# Patient Record
Sex: MALE | Race: WHITE | NOT HISPANIC OR LATINO | Employment: FULL TIME | URBAN - METROPOLITAN AREA
[De-identification: names, ages, dates, MRNs, and addresses within clinical notes are randomized per-mention and may not be internally consistent; named-entity substitution may affect disease eponyms.]

---

## 2017-02-15 DIAGNOSIS — E11.9 TYPE 2 DIABETES MELLITUS (H): Primary | ICD-10-CM

## 2017-02-15 RX ORDER — FLURBIPROFEN SODIUM 0.3 MG/ML
SOLUTION/ DROPS OPHTHALMIC
Qty: 400 EACH | Status: SHIPPED | OUTPATIENT
Start: 2017-02-15 | End: 2024-04-11

## 2017-02-15 RX ORDER — FLURBIPROFEN SODIUM 0.3 MG/ML
SOLUTION/ DROPS OPHTHALMIC
COMMUNITY
Start: 2016-10-17 | End: 2017-02-15

## 2017-02-15 NOTE — TELEPHONE ENCOUNTER
B-D U/F insulin pen needle  Last Written Prescription Date: Historical   Last Office Visit with [facility], [facility] or [facility] prescribing provider:  9/29/16        BP Readings from Last 3 Encounters:   09/29/16 108/68   12/16/15 108/72   01/28/15 100/74     Lab Results   Component Value Date    MICROL 11 09/29/2016     No results found for: MICROALBUMIN  Creatinine   Date Value Ref Range Status   09/29/2016 1.03 0.66 - 1.25 mg/dL Final   ]  GFR Estimate   Date Value Ref Range Status   09/29/2016 78 >60 mL/min/1.7m2 Final     Comment:     Non  GFR Calc   12/16/2015 81 >60 mL/min/1.7m2 Final     Comment:     Non  GFR Calc   01/28/2015 >90  Non  GFR Calc   >60 mL/min/1.7m2 Final     GFR Estimate If Black   Date Value Ref Range Status   09/29/2016 >90   GFR Calc   >60 mL/min/1.7m2 Final   12/16/2015 >90   GFR Calc   >60 mL/min/1.7m2 Final   01/28/2015 >90   GFR Calc   >60 mL/min/1.7m2 Final     Lab Results   Component Value Date    CHOL 189 09/29/2016     Lab Results   Component Value Date    HDL 46 09/29/2016     Lab Results   Component Value Date     09/29/2016     Lab Results   Component Value Date    TRIG 105 09/29/2016     Lab Results   Component Value Date    CHOLHDLRATIO 3.2 01/28/2015     Lab Results   Component Value Date    AST 15 09/29/2016     Lab Results   Component Value Date    ALT 38 09/29/2016     Lab Results   Component Value Date    A1C 6.6 09/29/2016    A1C 6.8 03/30/2016    A1C 6.8 12/16/2015    A1C 7.2 01/28/2015    A1C 7.3 05/21/2014     Potassium   Date Value Ref Range Status   09/29/2016 4.2 3.4 - 5.3 mmol/L Final

## 2017-02-20 ENCOUNTER — MYC REFILL (OUTPATIENT)
Dept: INTERNAL MEDICINE | Facility: CLINIC | Age: 47
End: 2017-02-20

## 2017-02-20 DIAGNOSIS — E10.9 TYPE 1 DIABETES MELLITUS WITHOUT COMPLICATION (H): ICD-10-CM

## 2017-02-21 NOTE — TELEPHONE ENCOUNTER
Kwikpen         Last Written Prescription Date: 9/29/16  Last Fill Quantity: 54, # refills: 11  Last Office Visit with [facility], P or [facility] prescribing provider:  9/29/16        BP Readings from Last 3 Encounters:   09/29/16 108/68   12/16/15 108/72   01/28/15 100/74     Lab Results   Component Value Date    MICROL 11 09/29/2016     No results found for: MICROALBUMIN  Creatinine   Date Value Ref Range Status   09/29/2016 1.03 0.66 - 1.25 mg/dL Final   ]  GFR Estimate   Date Value Ref Range Status   09/29/2016 78 >60 mL/min/1.7m2 Final     Comment:     Non  GFR Calc   12/16/2015 81 >60 mL/min/1.7m2 Final     Comment:     Non  GFR Calc   01/28/2015 >90  Non  GFR Calc   >60 mL/min/1.7m2 Final     GFR Estimate If Black   Date Value Ref Range Status   09/29/2016 >90   GFR Calc   >60 mL/min/1.7m2 Final   12/16/2015 >90   GFR Calc   >60 mL/min/1.7m2 Final   01/28/2015 >90   GFR Calc   >60 mL/min/1.7m2 Final     Lab Results   Component Value Date    CHOL 189 09/29/2016     Lab Results   Component Value Date    HDL 46 09/29/2016     Lab Results   Component Value Date     09/29/2016     Lab Results   Component Value Date    TRIG 105 09/29/2016     Lab Results   Component Value Date    CHOLHDLRATIO 3.2 01/28/2015     Lab Results   Component Value Date    AST 15 09/29/2016     Lab Results   Component Value Date    ALT 38 09/29/2016     Lab Results   Component Value Date    A1C 6.6 09/29/2016    A1C 6.8 03/30/2016    A1C 6.8 12/16/2015    A1C 7.2 01/28/2015    A1C 7.3 05/21/2014     Potassium   Date Value Ref Range Status   09/29/2016 4.2 3.4 - 5.3 mmol/L Final

## 2017-02-21 NOTE — TELEPHONE ENCOUNTER
Message from [employer]:  Original authorizing provider: MD Aldo Mascorro would like a refill of the following medications:  insulin lispro (HUMALOG KWIKPEN) 100 UNIT/ML soln [Kaleb Rosenthal MD]    Preferred pharmacy: [facility] - [address]    Comment:  Please make sure this is a 3 month supply! I am not getting that, it's only one box when it should be 5. Thanks

## 2017-03-08 ENCOUNTER — MYC MEDICAL ADVICE (OUTPATIENT)
Dept: INTERNAL MEDICINE | Facility: CLINIC | Age: 47
End: 2017-03-08

## 2017-03-08 DIAGNOSIS — E10.9 TYPE 1 DIABETES, HBA1C GOAL < 8% (H): ICD-10-CM

## 2017-03-08 DIAGNOSIS — E10.9 TYPE 1 DIABETES MELLITUS WITHOUT COMPLICATION (H): Primary | ICD-10-CM

## 2017-03-08 DIAGNOSIS — E10.9 TYPE 1 DIABETES, HBA1C GOAL < 7% (H): ICD-10-CM

## 2017-05-04 ENCOUNTER — MYC REFILL (OUTPATIENT)
Dept: INTERNAL MEDICINE | Facility: CLINIC | Age: 47
End: 2017-05-04

## 2017-05-04 DIAGNOSIS — E10.9 TYPE 1 DIABETES MELLITUS WITHOUT COMPLICATION (H): ICD-10-CM

## 2017-05-04 NOTE — TELEPHONE ENCOUNTER
Message from MyChart:  Original authorizing provider: MD Aldo Mascorro INGRIDDede Guevaramckenzie would like a refill of the following medications:  blood glucose monitoring (ONE TOUCH VERIO IQ) test strip [Kaleb Rosenthal MD]    Preferred pharmacy: [facility] - [address]    Comment:  This prescription needs to ve tripled to be a 3 moknth refill. Thanks, Pedro

## 2017-05-15 ENCOUNTER — MYC REFILL (OUTPATIENT)
Dept: INTERNAL MEDICINE | Facility: CLINIC | Age: 47
End: 2017-05-15

## 2017-05-15 DIAGNOSIS — E10.9 TYPE 1 DIABETES MELLITUS WITHOUT COMPLICATION (H): ICD-10-CM

## 2017-05-16 NOTE — TELEPHONE ENCOUNTER
Message from [employer]:  Original authorizing provider: MD Aldo Mascorro would like a refill of the following medications:  insulin lispro (HUMALOG KWIKPEN) 100 UNIT/ML injection [Kaleb Rosenthal MD]    Preferred pharmacy: [facility] - [address]    Comment:  I have one pen left. This prescription is not enough for 3 months. Please process this asap!! Thank you

## 2017-06-22 ENCOUNTER — MYC MEDICAL ADVICE (OUTPATIENT)
Dept: INTERNAL MEDICINE | Facility: CLINIC | Age: 47
End: 2017-06-22

## 2017-07-06 ENCOUNTER — OFFICE VISIT (OUTPATIENT)
Dept: INTERNAL MEDICINE | Facility: CLINIC | Age: 47
End: 2017-07-06
Payer: COMMERCIAL

## 2017-07-06 VITALS
HEART RATE: 63 BPM | BODY MASS INDEX: 32.79 KG/M2 | OXYGEN SATURATION: 95 % | TEMPERATURE: 97.6 F | WEIGHT: 234.2 LBS | HEIGHT: 71 IN | DIASTOLIC BLOOD PRESSURE: 72 MMHG | SYSTOLIC BLOOD PRESSURE: 108 MMHG

## 2017-07-06 DIAGNOSIS — E10.9 TYPE 1 DIABETES MELLITUS WITHOUT COMPLICATION (H): ICD-10-CM

## 2017-07-06 DIAGNOSIS — E78.5 HYPERLIPIDEMIA LDL GOAL <100: ICD-10-CM

## 2017-07-06 DIAGNOSIS — I10 ESSENTIAL HYPERTENSION, BENIGN: ICD-10-CM

## 2017-07-06 LAB
ALBUMIN SERPL-MCNC: 3.7 G/DL (ref 3.4–5)
ALP SERPL-CCNC: 72 U/L (ref 40–150)
ALT SERPL W P-5'-P-CCNC: 37 U/L (ref 0–70)
ANION GAP SERPL CALCULATED.3IONS-SCNC: 6 MMOL/L (ref 3–14)
AST SERPL W P-5'-P-CCNC: 16 U/L (ref 0–45)
BILIRUB SERPL-MCNC: 0.6 MG/DL (ref 0.2–1.3)
BUN SERPL-MCNC: 21 MG/DL (ref 7–30)
CALCIUM SERPL-MCNC: 9.2 MG/DL (ref 8.5–10.1)
CHLORIDE SERPL-SCNC: 109 MMOL/L (ref 94–109)
CHOLEST SERPL-MCNC: 194 MG/DL
CO2 SERPL-SCNC: 28 MMOL/L (ref 20–32)
CREAT SERPL-MCNC: 0.94 MG/DL (ref 0.66–1.25)
CREAT UR-MCNC: 245 MG/DL
GFR SERPL CREATININE-BSD FRML MDRD: 86 ML/MIN/1.7M2
GLUCOSE SERPL-MCNC: 67 MG/DL (ref 70–99)
HBA1C MFR BLD: 6.7 % (ref 4.3–6)
HDLC SERPL-MCNC: 45 MG/DL
LDLC SERPL CALC-MCNC: 127 MG/DL
MICROALBUMIN UR-MCNC: 14 MG/L
MICROALBUMIN/CREAT UR: 5.55 MG/G CR (ref 0–17)
NONHDLC SERPL-MCNC: 149 MG/DL
POTASSIUM SERPL-SCNC: 4 MMOL/L (ref 3.4–5.3)
PROT SERPL-MCNC: 7.4 G/DL (ref 6.8–8.8)
SODIUM SERPL-SCNC: 143 MMOL/L (ref 133–144)
T4 FREE SERPL-MCNC: 0.95 NG/DL (ref 0.76–1.46)
TRIGL SERPL-MCNC: 110 MG/DL
TSH SERPL DL<=0.005 MIU/L-ACNC: 4.04 MU/L (ref 0.4–4)

## 2017-07-06 PROCEDURE — 80053 COMPREHEN METABOLIC PANEL: CPT | Performed by: INTERNAL MEDICINE

## 2017-07-06 PROCEDURE — 84439 ASSAY OF FREE THYROXINE: CPT | Performed by: INTERNAL MEDICINE

## 2017-07-06 PROCEDURE — 82043 UR ALBUMIN QUANTITATIVE: CPT | Performed by: INTERNAL MEDICINE

## 2017-07-06 PROCEDURE — 84443 ASSAY THYROID STIM HORMONE: CPT | Performed by: INTERNAL MEDICINE

## 2017-07-06 PROCEDURE — 36415 COLL VENOUS BLD VENIPUNCTURE: CPT | Performed by: INTERNAL MEDICINE

## 2017-07-06 PROCEDURE — 99214 OFFICE O/P EST MOD 30 MIN: CPT | Performed by: INTERNAL MEDICINE

## 2017-07-06 PROCEDURE — 80061 LIPID PANEL: CPT | Performed by: INTERNAL MEDICINE

## 2017-07-06 PROCEDURE — 83036 HEMOGLOBIN GLYCOSYLATED A1C: CPT | Performed by: INTERNAL MEDICINE

## 2017-07-06 RX ORDER — SIMVASTATIN 40 MG
40 TABLET ORAL AT BEDTIME
Qty: 90 TABLET | Refills: 3 | Status: SHIPPED | OUTPATIENT
Start: 2017-07-06 | End: 2018-03-06

## 2017-07-06 RX ORDER — AMLODIPINE BESYLATE 5 MG/1
5 TABLET ORAL DAILY
Qty: 90 TABLET | Refills: 3 | Status: SHIPPED | OUTPATIENT
Start: 2017-07-06 | End: 2018-03-06

## 2017-07-06 NOTE — LETTER
[facility]  [address]  [address]      July 6, 2017      Aldo Sharpe  [address] [place] [id number]          Dear Aldo,      I have enclosed a copy of your most recent labs done here at the clinic and if available some of your prior labs for comparison.     Your Hemoglobin A1C and blood sugar tests look good and I would continue with your medication without change.  These tests should be repeated in 6 months.    Your LDL cholesterol is high and could be treated more aggressively.  Please follow-up with me to discuss your further more aggressive medication options if you are interested.    In addition, your liver function tests are completely normal and should be rechecked with your next cholesterol level.    Your thyroid function test is slightly abnormal but stable and should be rechecked here in the clinic in 6 months with a follow-up visit with me.  I will look forward to seeing you at that time and please call to make an appointment.    Please call me if you have further questions.        Kaleb Rosenthal MD

## 2017-07-06 NOTE — MR AVS SNAPSHOT
After Visit Summary   7/6/2017    Aldo Sharpe    MRN: [id number]           Patient Information     Date Of Birth          1970        Visit Information        Provider Department      7/6/2017 8:40 AM Kaleb Rosenthal MD [facility]        Today's Diagnoses     Type 1 diabetes mellitus without complication (H)        Hyperlipidemia LDL goal <100        Essential hypertension, benign           Follow-ups after your visit        Additional Services     ENDOCRINOLOGY ADULT REFERRAL       Your provider has referred you to: [facility]: [facility] [phone]   [email or web address]      Please be aware that coverage of these services is subject to the terms and limitations of your health insurance plan.  Call member services at your health plan with any benefit or coverage questions.      Please bring the following to your appointment:    >>   Any x-rays, CTs or MRIs which have been performed.  Contact the facility where they were done to arrange for  prior to your scheduled appointment.    >>   List of current medications   >>   This referral request   >>   Any documents/labs given to you for this referral                  Follow-up notes from your care team     Return in about 6 months (around 1/6/2018), or if symptoms worsen or fail to improve, for Lab Work.      Who to contact     If you have questions or need follow up information about today's clinic visit or your schedule please contact [facility] directly at [phone].  Normal or non-critical lab and imaging results will be communicated to you by MyChart, letter or phone within 4 business days after the clinic has received the results. If you do not hear from us within 7 days, please contact the clinic through MyChart or phone. If you have a critical or abnormal lab result, we will notify you by phone as soon as possible.  Submit refill  "requests through [employer] or call your pharmacy and they will forward the refill request to us. Please allow 3 business days for your refill to be completed.          Additional Information About Your Visit        [employer] Information     [employer] gives you secure access to your electronic health record. If you see a primary care provider, you can also send messages to your care team and make appointments. If you have questions, please call your primary care clinic.  If you do not have a primary care provider, please call [phone] and they will assist you.        Care EveryWhere ID     This is your Care EveryWhere ID. This could be used by other organizations to access your [place] medical records  [id number]        Your Vitals Were     Pulse Temperature Height Pulse Oximetry BMI (Body Mass Index)       63 97.6  F (36.4  C) (Oral) 5' 11\" (1.803 m) 95% 32.66 kg/m2        Blood Pressure from Last 3 Encounters:   07/06/17 108/72   09/29/16 108/68   12/16/15 108/72    Weight from Last 3 Encounters:   07/06/17 234 lb 3.2 oz (106.2 kg)   09/29/16 225 lb 6.4 oz (102.2 kg)   12/16/15 228 lb 4.8 oz (103.6 kg)              We Performed the Following     Albumin Random Urine Quantitative     Comprehensive metabolic panel     ENDOCRINOLOGY ADULT REFERRAL     Hemoglobin A1c     Lipid Profile     TSH with free T4 reflex          Today's Medication Changes          These changes are accurate as of: 7/6/17  9:02 AM.  If you have any questions, ask your nurse or doctor.               These medicines have changed or have updated prescriptions.        Dose/Directions    insulin glargine 100 UNIT/ML injection   Commonly known as:  LANTUS SOLOSTAR   This may have changed:    - how much to take  - how to take this  - when to take this  - additional instructions   Used for:  Type 1 diabetes mellitus without complication (H)   Changed by:  Kaleb Rosenthal MD        Dose:  66 Units   Inject 66 Units Subcutaneous daily   Quantity:  60 " mL   Refills:  3       insulin lispro 100 UNIT/ML injection   Commonly known as:  HumaLOG KWIKpen   This may have changed:    - how much to take  - how to take this  - when to take this   Used for:  Type 1 diabetes mellitus without complication (H)   Changed by:  Kaleb Rosenthal MD        Dose:  50 Units   Inject 50 Units Subcutaneous daily 10 units before breakfast, 15 units before lunch, 25 units before dinner + sliding scale With needle tips for KWIKPEN   Quantity:  45 mL   Refills:  3            Where to get your medicines      These medications were sent to [facility] - [place], [place] - [address] [place] [address]  [address] [facility] [address]     Phone:  [phone]     amLODIPine 5 MG tablet    blood glucose monitoring test strip    insulin glargine 100 UNIT/ML injection    simvastatin 40 MG tablet         Some of these will need a paper prescription and others can be bought over the counter.  Ask your nurse if you have questions.     Bring a paper prescription for each of these medications     insulin lispro 100 UNIT/ML injection                Primary Care Provider Office Phone # Fax #    Kaleb Rosenthal [phone]       [facility] [address] [facility] [address]        Equal Access to Services     DINA PEREZ AH: Hadii aad ku hadasho Soomaali, waaxda luqadaha, qaybta kaalmada adeegyada, waxay idiin hayaan wagner clark. So [facility] [phone].    ATENCIÓN: Si habla español, tiene a olson disposición servicios gratuitos de asistencia lingüística. [facility] [phone].    We comply with applicable federal civil rights laws and [place] laws. We do not discriminate on the basis of race, color, national origin, age, disability sex, sexual orientation or gender identity.            Thank you!     Thank you for choosing [facility]  for your care. Our goal is always to provide you with excellent care. Hearing back from  "our patients is one way we can continue to improve our services. Please take a few minutes to complete the written survey that you may receive in the mail after your visit with us. Thank you!             Your Updated Medication List - Protect others around you: Learn how to safely use, store and throw away your medicines at www.disposemymeds.org.          This list is accurate as of: 7/6/17  9:02 AM.  Always use your most recent med list.                   Brand Name Dispense Instructions for use Diagnosis    amLODIPine 5 MG tablet    NORVASC    90 tablet    Take 1 tablet (5 mg) by mouth daily    Essential hypertension, benign       aspirin 81 MG tablet     100 tablet    Take 1 tablet (81 mg) by mouth daily    Type 1 diabetes, HbA1c goal < 8% (H)       B-D U/F 31G X 5 MM   Generic drug:  insulin pen needle     400 each    Use one Syringe 4 times daily or as directed    Type 2 diabetes mellitus (H)       blood glucose monitoring lancets     1 Box    Use to test blood sugars 4-8 times daily or as directed.    Type 1 diabetes, HbA1c goal < 7% (H)       blood glucose monitoring meter device kit     1 kit    4-8 times per day    Type 1 diabetes, HbA1c goal < 8% (H)       blood glucose monitoring test strip    ONE TOUCH VERIO IQ    400 strip    Use to test blood sugars 4-8 times daily or as directed.    Type 1 diabetes mellitus without complication (H)       Fish Oil Oil      3 capsules.        insulin glargine 100 UNIT/ML injection    LANTUS SOLOSTAR    60 mL    Inject 66 Units Subcutaneous daily    Type 1 diabetes mellitus without complication (H)       insulin lispro 100 UNIT/ML injection    HumaLOG KWIKpen    45 mL    Inject 50 Units Subcutaneous daily 10 units before breakfast, 15 units before lunch, 25 units before dinner + sliding scale With needle tips for KWIKPEN    Type 1 diabetes mellitus without complication (H)       insulin syringe-needle U-100 31G X 5/16\" 0.5 ML    BD insulin syringe ultrafine    400 each "    Use one syringe 4 daily or as directed.    Type 1 diabetes, HbA1c goal < 8% (H)       lisinopril 20 MG tablet    PRINIVIL/ZESTRIL    180 tablet    Take 1 tablet (20 mg) by mouth 2 times daily    Type 1 diabetes mellitus without complication (H), Essential hypertension, benign       multivitamin, therapeutic with minerals Tabs tablet     90 each    Take 1 tablet by mouth daily    Type 1 diabetes mellitus without complication (H)       simvastatin 40 MG tablet    ZOCOR    90 tablet    Take 1 tablet (40 mg) by mouth At Bedtime    Hyperlipidemia LDL goal <100

## 2017-07-06 NOTE — NURSING NOTE
"Chief Complaint   Patient presents with     Diabetes       Initial /72  Pulse 63  Temp 97.6  F (36.4  C) (Oral)  Ht 5' 11\" (1.803 m)  Wt 234 lb 3.2 oz (106.2 kg)  SpO2 95%  BMI 32.66 kg/m2 Estimated body mass index is 32.66 kg/(m^2) as calculated from the following:    Height as of this encounter: 5' 11\" (1.803 m).    Weight as of this encounter: 234 lb 3.2 oz (106.2 kg).  Medication Reconciliation: complete   Maribell Chavarria, SHEBA      "

## 2017-07-06 NOTE — PROGRESS NOTES
SUBJECTIVE:                                                    Aldo Sharpe is a 47 year old male who presents to clinic today for the following health issues:      Diabetes Follow-up    Patient is checking blood sugars: 8-9 times daily.    Blood sugar testing frequency justification: Adjustment of medication(s)  Results are as follows:         am - 100              postprandial after breakfast - 115         lunchtime - 120              postprandial after lunch- 130         suppertime - 100              postprandial after supper- 130         bedtime - 100    Diabetic concerns: None     Symptoms of hypoglycemia (low blood sugar): none     Paresthesias (numbness or burning in feet) or sores: No     Date of last diabetic eye exam: 2016    Hyperlipidemia Follow-Up      Rate your low fat/cholesterol diet?: good    Taking statin?  No    Other lipid medications/supplements?:  none    Hypertension Follow-up      Outpatient blood pressures are being checked at grocery store.  Results are average.    Low Salt Diet: not monitoring salt      Amount of exercise or physical activity: Walks-     Problems taking medications regularly: No    Medication side effects: none    Diet: low fat/cholesterol    Other concerns:  [place] DMV diabetic form to be updated    Problem list and histories reviewed & adjusted, as indicated.  Additional history: as documented    Patient Active Problem List   Diagnosis     HYPERLIPIDEMIA LDL GOAL <100     Type 1 diabetes mellitus without complication (H)     Essential hypertension, benign     History reviewed. No pertinent surgical history.    Social History   Substance Use Topics     Smoking status: Never Smoker     Smokeless tobacco: Not on file     Alcohol use No      Comment: none     History reviewed. No pertinent family history.      Current Outpatient Prescriptions   Medication Sig Dispense Refill     blood glucose monitoring (ONE TOUCH VERIO IQ) test strip Use to test blood sugars  "4-8 times daily or as directed. 400 strip PRN     insulin lispro (HUMALOG KWIKPEN) 100 UNIT/ML injection Inject 50 Units Subcutaneous daily 10 units before breakfast, 15 units before lunch, 25 units before dinner + sliding scale With needle tips for KWIKPEN 45 mL 3     simvastatin (ZOCOR) 40 MG tablet Take 1 tablet (40 mg) by mouth At Bedtime 90 tablet 3     insulin glargine (LANTUS SOLOSTAR) 100 UNIT/ML injection Inject 66 Units Subcutaneous daily 60 mL 3     amLODIPine (NORVASC) 5 MG tablet Take 1 tablet (5 mg) by mouth daily 90 tablet 3     blood glucose monitoring (ONETOUCH ULTRASMART) meter device kit 4-8 times per day 1 kit 0     blood glucose monitoring (ONE TOUCH DELICA) lancets Use to test blood sugars 4-8 times daily or as directed. 1 Box prn     B-D U/F insulin pen needle Use one Syringe 4 times daily or as directed 400 each prn     lisinopril (PRINIVIL,ZESTRIL) 20 MG tablet Take 1 tablet (20 mg) by mouth 2 times daily 180 tablet 3     multivitamin, therapeutic with minerals (MULTI-VITAMIN) TABS Take 1 tablet by mouth daily 90 each 1     insulin syringe-needle U-100 (BD INSULIN SYRINGE ULTRAFINE) 31G X 5/16\" 0.5 ML Use one syringe 4 daily or as directed. 400 each prn     aspirin 81 MG tablet Take 1 tablet (81 mg) by mouth daily 100 tablet 3     Fish Oil OIL 3 capsules.       [DISCONTINUED] insulin lispro (HUMALOG KWIKPEN) 100 UNIT/ML injection 10 units before breakfast, 15 units before lunch, 25 units before dinner + sliding scale With needle tips for KWIKPEN 270 mL 0     [DISCONTINUED] amLODIPine (NORVASC) 5 MG tablet Take 1 tablet (5 mg) by mouth daily 90 tablet 3     [DISCONTINUED] insulin glargine (LANTUS SOLOSTAR) 100 UNIT/ML PEN 66 units at bedtime (Patient taking differently: Inject 67 Units Subcutaneous daily 48 units in AM and 19 Units in PM) 9 mL 11     [DISCONTINUED] simvastatin (ZOCOR) 40 MG tablet Take 1 tablet (40 mg) by mouth At Bedtime 90 tablet 3     No Known Allergies  Recent Labs   Lab " "Test  09/29/16   0748  03/30/16   0857  12/16/15   0825  01/28/15   1043   A1C  6.6*  6.8*  6.8*  7.2*   LDL  122*   --   92  104   HDL  46   --   46  54   TRIG  105   --   92  75   ALT  38   --   44  47   CR  1.03   --   1.00  0.89   GFRESTIMATED  78   --   81  >90  Non  GFR Calc     GFRESTBLACK  >90   GFR Calc     --   >90   GFR Calc    >90   GFR Calc     POTASSIUM  4.2   --   4.4  4.3   TSH  3.31   --   3.09  2.10      BP Readings from Last 3 Encounters:   09/29/16 108/68   12/16/15 108/72   01/28/15 100/74    Wt Readings from Last 3 Encounters:   09/29/16 225 lb 6.4 oz (102.2 kg)   12/16/15 228 lb 4.8 oz (103.6 kg)   01/28/15 232 lb 3.2 oz (105.3 kg)           Labs reviewed in EPIC    Reviewed and updated as needed this visit by clinical staff  Tobacco  Allergies  Med Hx  Surg Hx  Fam Hx  Soc Hx      Reviewed and updated as needed this visit by Provider       ROS:  C: NEGATIVE for fever, chills, change in weight  E/M: NEGATIVE for ear, mouth and throat problems  R: NEGATIVE for significant cough or SOB  CV: NEGATIVE for chest pain, palpitations or peripheral edema  GI: NEGATIVE for nausea, abdominal pain, heartburn, or change in bowel habits  : NEGATIVE for frequency, dysuria, or hematuria  M: NEGATIVE for significant arthralgias or myalgia  H: NEGATIVE for bleeding problems  P: NEGATIVE for changes in mood or affect    OBJECTIVE:                                                    /72  Pulse 63  Temp 97.6  F (36.4  C) (Oral)  Ht 5' 11\" (1.803 m)  Wt 234 lb 3.2 oz (106.2 kg)  SpO2 95%  BMI 32.66 kg/m2  Body mass index is 32.66 kg/(m^2).  GENERAL: healthy, alert and no distress  EYES: Eyes grossly normal to inspection, extraocular movements - intact, and PERRL  HENT: ear canals- normal; TMs- normal; Nose- normal; Mouth- no ulcers, no lesions  NECK: no tenderness, no adenopathy, no asymmetry, no masses, no stiffness; thyroid- normal " to palpation  RESP: lungs clear to auscultation - no rales, no rhonchi, no wheezes  CV: regular rates and rhythm, normal S1 S2, no S3 or S4 and no click or rub   MS: extremities- no gross deformities noted  PSYCH: Alert and oriented times 3; speech- coherent , normal rate and volume; able to articulate logical thoughts, able to abstract reason, no tangential thoughts, no hallucinations or delusions, affect- normal       ASSESSMENT/PLAN:                                                    (E10.9) Type 1 diabetes mellitus without complication (H)  Comment: DMV forms filled out and faxed and also advised that may benefit from seeing Dr. Dimas.  Plan: blood glucose monitoring (ONE TOUCH VERIO IQ)         test strip, insulin lispro (HUMALOG KWIKPEN)         100 UNIT/ML injection, insulin glargine (LANTUS        SOLOSTAR) 100 UNIT/ML injection, Comprehensive         metabolic panel, Hemoglobin A1c, Albumin Random        Urine Quantitative, TSH with free T4 reflex            (E78.5) Hyperlipidemia LDL goal <100  Comment: labs as fasting  Plan: simvastatin (ZOCOR) 40 MG tablet, Lipid Profile            (I10) Essential hypertension, benign  Comment: stable at goal on therapy  Plan: amLODIPine (NORVASC) 5 MG tablet, Comprehensive        metabolic panel          See Patient Instructions    Kaleb Rosenthal MD  [facility]    THE MEDICATION LIST HAS BEEN FULLY RECONCILED BY THE M.D. AND THE NURSING STAFF.  25 minutes spent with this patient, face to face, discussing treatment options for listed problems above as well as side effects of appropriate medications.  Counseling time extended beyond 50% of the clinic visit.  Medication dosing, treatment plan and follow-up were discussed. Also reviewed need for primary care testing for patient.

## 2017-09-19 ENCOUNTER — MYC REFILL (OUTPATIENT)
Dept: INTERNAL MEDICINE | Facility: CLINIC | Age: 47
End: 2017-09-19

## 2017-09-19 DIAGNOSIS — E10.9 TYPE 1 DIABETES MELLITUS WITHOUT COMPLICATION (H): ICD-10-CM

## 2017-09-20 NOTE — TELEPHONE ENCOUNTER
Message from [employer]:  Original authorizing provider: MD Aldo Mascorro would like a refill of the following medications:  insulin lispro (HUMALOG KWIKPEN) 100 UNIT/ML injection [Kaleb Rosenthal MD]    Preferred pharmacy: [facility] - [address]    Comment:  Please update the amount. I am being sent over and over a one month supply and it is supposed to be a 3 month supply. Thank you very much!

## 2017-09-25 DIAGNOSIS — E10.9 TYPE 1 DIABETES MELLITUS WITHOUT COMPLICATION (H): ICD-10-CM

## 2017-09-25 NOTE — TELEPHONE ENCOUNTER
The amount dispensed in the 9/20/2017 refill is not enough, it should be 5 1/2 boxes.  Please correct refill and call [facility] at [phone] Ext. [phone] for an override.

## 2017-09-26 DIAGNOSIS — I10 ESSENTIAL HYPERTENSION, BENIGN: ICD-10-CM

## 2017-09-26 DIAGNOSIS — E10.9 TYPE 1 DIABETES MELLITUS WITHOUT COMPLICATION (H): ICD-10-CM

## 2017-09-26 NOTE — TELEPHONE ENCOUNTER
Attempted to call pt line busy x2 . Clarification in needing the maximum he uses daily for sliding scale .Anna Delaney RN

## 2017-09-27 RX ORDER — LISINOPRIL 20 MG/1
TABLET ORAL
Qty: 180 TABLET | Refills: 2 | Status: SHIPPED | OUTPATIENT
Start: 2017-09-27 | End: 2018-03-06

## 2017-09-27 NOTE — TELEPHONE ENCOUNTER
Lisinopril       Last Written Prescription Date: 9/29/16  Last Fill Quantity: 180, # refills: 3  Last Office Visit with G, [email or web address] or [facility] prescribing provider: 7/6/17       Potassium   Date Value Ref Range Status   07/06/2017 4.0 3.4 - 5.3 mmol/L Final     Creatinine   Date Value Ref Range Status   07/06/2017 0.94 0.66 - 1.25 mg/dL Final     BP Readings from Last 3 Encounters:   07/06/17 108/72   09/29/16 108/68   12/16/15 108/72

## 2017-09-28 NOTE — TELEPHONE ENCOUNTER
MD please sign . Pt called and states his max sliding scale in 25 units a day but it cannot be written that way so pharmacy recommended these instructions . This has been called into pharmacy .Anna Delaney RN

## 2017-09-28 NOTE — TELEPHONE ENCOUNTER
How  Rx is currently written states pt has scheduled dose 25 units at bedtime which is not appropriate, especially if just needing extra insulin. Please clarify the dose that pt is using with each meal and if he is using sliding scale that is added to the  Usual 3x/day injections with meals that ends up being an extra 25 units through the day or what pt is doing specifically and then route back

## 2017-12-17 DIAGNOSIS — E10.9 TYPE 1 DIABETES MELLITUS WITHOUT COMPLICATION (H): ICD-10-CM

## 2017-12-19 RX ORDER — INSULIN LISPRO 100 [IU]/ML
INJECTION, SOLUTION INTRAVENOUS; SUBCUTANEOUS
Qty: 120 ML | Refills: 0 | Status: SHIPPED | OUTPATIENT
Start: 2017-12-19 | End: 2018-03-06

## 2018-03-06 ENCOUNTER — OFFICE VISIT (OUTPATIENT)
Dept: ENDOCRINOLOGY | Facility: CLINIC | Age: 48
End: 2018-03-06
Payer: COMMERCIAL

## 2018-03-06 ENCOUNTER — TELEPHONE (OUTPATIENT)
Dept: INTERNAL MEDICINE | Facility: CLINIC | Age: 48
End: 2018-03-06

## 2018-03-06 VITALS
BODY MASS INDEX: 32.73 KG/M2 | TEMPERATURE: 98.4 F | HEART RATE: 68 BPM | SYSTOLIC BLOOD PRESSURE: 122 MMHG | HEIGHT: 71 IN | WEIGHT: 233.8 LBS | DIASTOLIC BLOOD PRESSURE: 70 MMHG

## 2018-03-06 DIAGNOSIS — E78.5 HYPERLIPIDEMIA LDL GOAL <100: ICD-10-CM

## 2018-03-06 DIAGNOSIS — E10.9 TYPE 1 DIABETES MELLITUS WITHOUT COMPLICATION (H): Primary | ICD-10-CM

## 2018-03-06 DIAGNOSIS — E03.8 SUBCLINICAL HYPOTHYROIDISM: ICD-10-CM

## 2018-03-06 DIAGNOSIS — I10 ESSENTIAL HYPERTENSION, BENIGN: ICD-10-CM

## 2018-03-06 DIAGNOSIS — E10.9 TYPE 1 DIABETES MELLITUS WITHOUT COMPLICATION (H): ICD-10-CM

## 2018-03-06 DIAGNOSIS — R53.82 CHRONIC FATIGUE: ICD-10-CM

## 2018-03-06 LAB
HBA1C MFR BLD: 6.6 % (ref 4.3–6)
TSH SERPL DL<=0.005 MIU/L-ACNC: 2.28 MU/L (ref 0.4–4)

## 2018-03-06 PROCEDURE — 99204 OFFICE O/P NEW MOD 45 MIN: CPT | Performed by: INTERNAL MEDICINE

## 2018-03-06 PROCEDURE — 36415 COLL VENOUS BLD VENIPUNCTURE: CPT | Performed by: INTERNAL MEDICINE

## 2018-03-06 PROCEDURE — 99207 C FOOT EXAM  NO CHARGE: CPT | Performed by: INTERNAL MEDICINE

## 2018-03-06 PROCEDURE — 83036 HEMOGLOBIN GLYCOSYLATED A1C: CPT | Performed by: INTERNAL MEDICINE

## 2018-03-06 PROCEDURE — 84443 ASSAY THYROID STIM HORMONE: CPT | Performed by: INTERNAL MEDICINE

## 2018-03-06 PROCEDURE — 84403 ASSAY OF TOTAL TESTOSTERONE: CPT | Performed by: INTERNAL MEDICINE

## 2018-03-06 PROCEDURE — 84270 ASSAY OF SEX HORMONE GLOBUL: CPT | Performed by: INTERNAL MEDICINE

## 2018-03-06 RX ORDER — LISINOPRIL 20 MG/1
20 TABLET ORAL 2 TIMES DAILY
Qty: 180 TABLET | Refills: 1 | Status: SHIPPED | OUTPATIENT
Start: 2018-03-06 | End: 2018-04-02

## 2018-03-06 RX ORDER — AMLODIPINE BESYLATE 5 MG/1
5 TABLET ORAL DAILY
Qty: 90 TABLET | Refills: 1 | Status: SHIPPED | OUTPATIENT
Start: 2018-03-06 | End: 2018-04-02

## 2018-03-06 RX ORDER — SIMVASTATIN 40 MG
40 TABLET ORAL AT BEDTIME
Qty: 90 TABLET | Refills: 1 | Status: SHIPPED | OUTPATIENT
Start: 2018-03-06 | End: 2018-04-02

## 2018-03-06 ASSESSMENT — PAIN SCALES - GENERAL: PAINLEVEL: NO PAIN (0)

## 2018-03-06 NOTE — NURSING NOTE
"Chief Complaint   Patient presents with     Establish Care     DM1       Initial /70  Pulse 68  Temp 98.4  F (36.9  C) (Oral)  Ht 1.803 m (5' 11\")  Wt 106.1 kg (233 lb 12.8 oz)  BMI 32.61 kg/m2 Estimated body mass index is 32.61 kg/(m^2) as calculated from the following:    Height as of this encounter: 1.803 m (5' 11\").    Weight as of this encounter: 106.1 kg (233 lb 12.8 oz).  Medication Reconciliation: complete    "

## 2018-03-06 NOTE — LETTER
3/6/2018         RE: Aldo Sharpe  [address] [place] [id number]        Dear Colleague,    Thank you for referring your patient, Aldo Sharpe, to the [facility]. Please see a copy of my visit note below.    Endocrinology Clinic Note:  Name: Aldo Sharpe  Seen at the request of Kaleb Rosenthal for Diabetes.  HPI:  Aldo Sharpe is a 47 year old male who presents for the evaluation/management of type 1 diabetes.     1. Type 1 DM:  Orginally diagnosed at the age of: 16 years  Hospitalization for DKA: none  Current Regimen: Lantus 54 Units AM and 15 Units PM, Humalog 8/15/20 Units with breakfast/lunch/dinner respectively ( estimates based on experience and reports that it is accurate most of the time)  BS checks: 6-10 times/day  Average Meter Download: 137. Mostly in range. 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  Fixed meal pattern: yes  Patient counting carbs: sometimes  Using PUMP: no-- is not interested in pump.      DM Complications:   Complications:   Diabetes Complications  Description / Detail    Diabetic Retinopathy  No   CAD / PAD  No   Neuropathy  No   Nephropathy / Microalbuminuria  No  Lab Results   Component Value Date    UMALCR 5.55 07/06/2017         Gastroparesis  No   Hypoglycemia [place]ar[place]  No          2. Hypertension: Blood Pressure today:   BP Readings from Last 3 Encounters:   07/06/17 108/72   09/29/16 108/68   12/16/15 108/72   On medications.  3. Hyperlipidemia:  On medications.  4. Chronic fatigue-  Interested to know testosterone levels.  PMH/PSH:  Past Medical History:   Diagnosis Date     Diabetic eye exam (H) 03/18/14     Hyperlipidemia LDL goal <100 10/31/2010     Type 1 diabetes, HbA1c goal < 7% (H) 10/31/2010     No past surgical history on file.  Family Hx:  No family history on file.        DM2:             Social  "Hx:  Social History     Social History     Marital status:      Spouse name: N/A     Number of children: N/A     Years of education: N/A     Occupational History     Not on file.     Social History Main Topics     Smoking status: Never Smoker     Smokeless tobacco: Not on file     Alcohol use No      Comment: none     Drug use: No     Sexual activity: Yes     Partners: Female     Other Topics Concern     Not on file     Social History Narrative          MEDICATIONS:  has a current medication list which includes the following prescription(s): humalog kwikpen, lisinopril, blood glucose monitoring, simvastatin, insulin glargine, amlodipine, blood glucose monitoring, blood glucose monitoring, b-d u/f, multivitamin, therapeutic with minerals, insulin syringe-needle u-100, aspirin, and fish oil.    ROS     ROS: 10 point ROS neg other than the symptoms noted above in the HPI.    Physical Exam   VS: /70  Pulse 68  Temp 98.4  F (36.9  C) (Oral)  Ht 1.803 m (5' 11\")  Wt 106.1 kg (233 lb 12.8 oz)  BMI 32.61 kg/m2  GENERAL: AXOX3, NAD, well dressed, answering questions appropriately, appears stated age.  HEENT: No exopthalmous, no proptosis, EOMI, no lig lag, no retraction  NECK: Thyroid normal in size, non tender, no nodules were palpated.  CV: RRR, no rubs, gallops, no murmurs  LUNGS: CTAB, no wheezes, rales, or ronchi  ABDOMEN: +BS  EXTREMITIES: no edema, +pulses, no rashes, no lesions  NEUROLOGY: CN grossly intact, + monofilament, + DTR upper and lower extremity, no tremors  DM FOOT EXAM: normal DP and PT pulses, no trophic changes or ulcerative lesions, normal sensory exam and normal monofilament exam.   MSK: grossly intact  SKIN: no rashes, no lesions  PSYCH: normal affect and mood      LABS:  A1c:  Lab Results   Component Value Date    A1C 6.7 07/06/2017    A1C 6.6 09/29/2016    A1C 6.8 03/30/2016    A1C 6.8 12/16/2015    A1C 7.2 01/28/2015         BMP:  Last Basic Metabolic Panel:  Lab Results "   Component Value Date     07/06/2017      Lab Results   Component Value Date    POTASSIUM 4.0 07/06/2017     Lab Results   Component Value Date    CHLORIDE 109 07/06/2017     Lab Results   Component Value Date    MAGGIE 9.2 07/06/2017     Lab Results   Component Value Date    CO2 28 07/06/2017     Lab Results   Component Value Date    BUN 21 07/06/2017     Lab Results   Component Value Date    CR 0.94 07/06/2017     Lab Results   Component Value Date    GLC 67 07/06/2017         Urine Micro:  Lab Results   Component Value Date    MICROL 14 07/06/2017     No results found for: MICROALBUMIN      LFTs/Lipids:  Recent Labs   Lab Test  07/06/17   0859  09/29/16   0748   01/28/15   1043  05/21/14   0843   CHOL  194  189   < >  173  206*   HDL  45  46   < >  54  45   LDL  127*  122*   < >  104  139*   TRIG  110  105   < >  75  112   CHOLHDLRATIO   --    --    --   3.2  4.6    < > = values in this interval not displayed.       Liver Function Studies -   Recent Labs   Lab Test  07/06/17   0859   PROTTOTAL  7.4   ALBUMIN  3.7   BILITOTAL  0.6   ALKPHOS  72   AST  16   ALT  37         TFTs:  Lab Results   Component Value Date    TSH 4.04 07/06/2017           Blood Glucose and pump data/ Meter reviewed.     All pertinent notes, labs, and images personally reviewed by me.     A/P  Mr.David INGRID Sharpe is a 47 year old here for the evaluation/management of diabetes:    1. DM1 - Under Good control. A1c  6.7%. No known complications form DM.  Historically blood glucose has been under good control.  Blood sugars are mostly in normal range.  He is checking multiple times during the day.  He is able to feel symptoms of hypoglycemia as well as he can tell if the blood sugar is rising higher.  Lowest blood sugar was 51.  He is not carbohydrate counting but he is estimating amount of insulin with each meal based on his experience and reports that he is pretty accurate about that.  Plan:  Given variability of the numbers as well as  episodes of hypoglycemia I recommend to switch to Tresiba from lantus.  Stop Lantus  Start Tresiba 65 Units/day  humalog same dose 8/15/20 Units with breakfast/lunch/dinner repectively  Think about continuous glucose monitor.  Labs and follow up in 3 months  CDE referral- he would like to hold off  He is not interested in insulin pump or takes, at this time.  If he change his mind he will let me know and I can make referral for diabetic educator.    Recommend checking blood sugars before meals and at bedtime.    If Blood glucose are low more often-> 2-3 times/week- give us a call.  The patient is advised to Make better food choices: reduce carbs, Reduce portion size, weight loss and exercise 3-4 times a week.  Discussed hypoglycemia signs and symptoms as well as management in detail.      There is some variability among people, most will usually develop symptoms suggestive of hypoglycemia when blood glucose levels are lowered to the mid 60's. The first set of symptoms are called adrenergic. Patients may experience any of the following nervousness, sweating, intense hunger, trembling, weakness, palpitations, and difficulty speaking. When BS fall below 50 the patient is unable to talk and take oral therapy.  Would recommend Glucagon emergency kit for the patient and education for family and friends around the patient.   The acute management of hypoglycemia involves the rapid delivery of a source of easily absorbed sugar. Regular soda, juice, lifesavers, table sugar, are good options. 15 grams of glucose is the dose that is given, followed by an assessment of symptoms and a blood glucose check if possible. If after 10 minutes there is no improvement, another 10-15 grams should be given. This can be repeated up to three times. The equivalency of 10-15 grams of glucose (approximate servings) are: Four lifesavers, 4 teaspoons of sugar, or 1/2 cup or 4 oz of juice or regular pop.    2. Hypertension - Under Good  control.   Takes Amlodipine 5 mg, Lisinopril 20 mg      3. Hyperlipidemia - Under poor control. Takes Simvastatin 40 mg. , HDL 45.  Consider increasing the dose of statin.  4. Prevention  Flu Shot-  Pneumovax-   Opthalmology- 2017  ASA- 81 mg  Smoking- no        Follow-up:  follow up in 3 month(s)    Jodi Yung M.D  Endocrinology  [facility]  CC: Kaleb Rosenthal    More than 50% of face to face time spent with Mr. Sharpe on counseling / coordinating his care.     All questions were answered.  The patient indicates understanding of the above issues and agrees with the plan set forth. 45 min.     Disclaimer: This note consists of symbols derived from keyboarding, dictation and/or voice recognition software. As a result, there may be errors in the script that have gone undetected. Please consider this when interpreting information found in this chart.      Again, thank you for allowing me to participate in the care of your patient.        Sincerely,        Jodi Yung MD

## 2018-03-06 NOTE — TELEPHONE ENCOUNTER
Spoke with patient who states because of insurance changes his pharmacy was switched to [facility]. Requesting new Scripts for simvastatin, amlodipine and Norvasc are sent to new pharmacy. Rx's pended

## 2018-03-06 NOTE — MR AVS SNAPSHOT
After Visit Summary   3/6/2018    Aldo Sharpe    MRN: [id number]           Patient Information     Date Of Birth          1970        Visit Information        Provider Department      3/6/2018 9:00 AM Jodi Yung MD [facility]        Today's Diagnoses     Type 1 diabetes mellitus without complication (H)    -  1    Hyperlipidemia LDL goal <100        Essential hypertension, benign          Care Instructions    [facility]   Dr Yung, Endocrinology Department      [facility]   [address] [address]  Appointment [phone]  Fax: [phone]   Monday and Tuesday         [place] [address] [place] [address]  Appointment [phone]  Fax: [phone]  Wednesday and Thursday           Stop Lantus  Start Tresiba 65 Units/day  humalog same dose  Think about continuous glucose monitor.  Labs and follow up in 3 months    Recommend checking blood sugars before meals and at bedtime.    If Blood glucose are low more often-> 2-3 times/week- give us a call.  The patient is advised to Make better food choices: reduce carbs, Reduce portion size, weight loss and exercise 3-4 times a week.  Discussed hypoglycemia signs and symptoms as well as management in detail.                  Follow-ups after your visit        Who to contact     If you have questions or need follow up information about today's clinic visit or your schedule please contact [facility] directly at [phone].  Normal or non-critical lab and imaging results will be communicated to you by MyChart, letter or phone within 4 business days after the clinic has received the results. If you do not hear from us within 7 days, please contact the clinic through MyChart or phone. If you have a critical or abnormal lab result, we will notify you by phone as soon as  "possible.  Submit refill requests through Memento or call your pharmacy and they will forward the refill request to us. Please allow 3 business days for your refill to be completed.          Additional Information About Your Visit        [employer] Information     Memento gives you secure access to your electronic health record. If you see a primary care provider, you can also send messages to your care team and make appointments. If you have questions, please call your primary care clinic.  If you do not have a primary care provider, please call [phone] and they will assist you.        Care EveryWhere ID     This is your Care EveryWhere ID. This could be used by other organizations to access your [facility] medical records  [id number]        Your Vitals Were     Pulse Temperature Height BMI (Body Mass Index)          68 98.4  F (36.9  C) (Oral) 1.803 m (5' 11\") 32.61 kg/m2         Blood Pressure from Last 3 Encounters:   03/06/18 122/70   07/06/17 108/72   09/29/16 108/68    Weight from Last 3 Encounters:   03/06/18 106.1 kg (233 lb 12.8 oz)   07/06/17 106.2 kg (234 lb 3.2 oz)   09/29/16 102.2 kg (225 lb 6.4 oz)              We Performed the Following     C FOOT EXAM  NO CHARGE     Hemoglobin A1c          Today's Medication Changes          These changes are accurate as of 3/6/18  9:51 AM.  If you have any questions, ask your nurse or doctor.               Start taking these medicines.        Dose/Directions    insulin degludec 200 UNIT/ML pen   Commonly known as:  TRESIBA   Used for:  Type 1 diabetes mellitus without complication (H)   Started by:  Jodi Yung MD        Dose:  65 Units   Inject 65 Units Subcutaneous daily   Quantity:  45 mL   Refills:  1         Stop taking these medicines if you haven't already. Please contact your care team if you have questions.     insulin glargine 100 UNIT/ML injection   Commonly known as:  LANTUS SOLOSTAR   Stopped by:  Jodi Yung MD              "   Where to get your medicines      These medications were sent to [facility] - [place], [place] - [address] AT Portal to Registered [facility] Sites  [address], [facility] [address]     Phone:  [phone]     insulin degludec 200 UNIT/ML pen                Primary Care Provider Office Phone # Fax #    Kaleb Rosenthal [phone]       [address] [place] [address]        Equal Access to Services     [facility] : Hadii aad ku hadasho Soomaali, waaxda luqadaha, qaybta kaalmada adeegyada, waxay idiin hayaan adeeg kharaelif martinez . So [facility] [phone].    ATENCIÓN: Si habla español, tiene a olson disposición servicios gratuitos de asistencia lingüística. [facility] [phone].    We comply with applicable federal civil rights laws and [place] laws. We do not discriminate on the basis of race, color, national origin, age, disability, sex, sexual orientation, or gender identity.            Thank you!     Thank you for choosing [facility]  for your care. Our goal is always to provide you with excellent care. Hearing back from our patients is one way we can continue to improve our services. Please take a few minutes to complete the written survey that you may receive in the mail after your visit with us. Thank you!             Your Updated Medication List - Protect others around you: Learn how to safely use, store and throw away your medicines at www.disposemymeds.org.          This list is accurate as of 3/6/18  9:51 AM.  Always use your most recent med list.                   Brand Name Dispense Instructions for use Diagnosis    amLODIPine 5 MG tablet    NORVASC    90 tablet    Take 1 tablet (5 mg) by mouth daily    Essential hypertension, benign       aspirin 81 MG tablet     100 tablet    Take 1 tablet (81 mg) by mouth daily    Type 1 diabetes, HbA1c goal < 8% (H)       B-D U/F 31G X 5 MM   Generic drug:  insulin pen needle     400 each    Use  "one Syringe 4 times daily or as directed    Type 2 diabetes mellitus (H)       blood glucose monitoring lancets     1 Box    Use to test blood sugars 4-8 times daily or as directed.    Type 1 diabetes, HbA1c goal < 7% (H)       blood glucose monitoring meter device kit     1 kit    4-8 times per day    Type 1 diabetes, HbA1c goal < 8% (H)       blood glucose monitoring test strip    ONETOUCH VERIO IQ    400 strip    Use to test blood sugars 4-8 times daily or as directed.    Type 1 diabetes mellitus without complication (H)       Fish Oil Oil      3 capsules.        HumaLOG KWIKpen 100 UNIT/ML injection   Generic drug:  insulin lispro     120 mL    INJECT 10 UNITS UNDER THE SKIN BEFORE BREAKFAST, 15 UNITS BEFORE LUNCH, 25 UNITS BEFORE DINNER AND 75 UNITS BEFORE BEDTIME    Type 1 diabetes mellitus without complication (H)       insulin degludec 200 UNIT/ML pen    TRESIBA    45 mL    Inject 65 Units Subcutaneous daily    Type 1 diabetes mellitus without complication (H)       insulin syringe-needle U-100 31G X 5/16\" 0.5 ML    BD insulin syringe ultrafine    400 each    Use one syringe 4 daily or as directed.    Type 1 diabetes, HbA1c goal < 8% (H)       lisinopril 20 MG tablet    PRINIVIL/ZESTRIL    180 tablet    TAKE 1 TABLET TWICE A DAY    Type 1 diabetes mellitus without complication (H), Essential hypertension, benign       multivitamin, therapeutic with minerals Tabs tablet     90 each    Take 1 tablet by mouth daily    Type 1 diabetes mellitus without complication (H)       simvastatin 40 MG tablet    ZOCOR    90 tablet    Take 1 tablet (40 mg) by mouth At Bedtime    Hyperlipidemia LDL goal <100         "

## 2018-03-06 NOTE — TELEPHONE ENCOUNTER
Please verify I am unsure what this message is requesting if this is a refill request then medication should be entered for refill for cosign.

## 2018-03-06 NOTE — PATIENT INSTRUCTIONS
[facility]   Dr Yung, Endocrinology Department      [facility]   [address] [address]  Appointment [phone]  Fax: [phone]   Monday and Tuesday         [facility]   [address] [place] [address]  Appointment [phone]  Fax: [phone]  Wednesday and Thursday           Stop Lantus  Start Tresiba 65 Units/day  humalog same dose  Think about continuous glucose monitor.  Labs and follow up in 3 months    Recommend checking blood sugars before meals and at bedtime.    If Blood glucose are low more often-> 2-3 times/week- give us a call.  The patient is advised to Make better food choices: reduce carbs, Reduce portion size, weight loss and exercise 3-4 times a week.  Discussed hypoglycemia signs and symptoms as well as management in detail.

## 2018-03-06 NOTE — PROGRESS NOTES
Endocrinology Clinic Note:  Name: Aldo Sharpe  Seen at the request of Kaleb Rosenthal for Diabetes.  HPI:  Aldo Sharpe is a 47 year old male who presents for the evaluation/management of type 1 diabetes.     1. Type 1 DM:  Orginally diagnosed at the age of: 16 years  Hospitalization for DKA: none  Current Regimen: Lantus 54 Units AM and 15 Units PM, Humalog 8/15/20 Units with breakfast/lunch/dinner respectively ( estimates based on experience and reports that it is accurate most of the time)  BS checks: 6-10 times/day  Average Meter Download: 137. Mostly in range. 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  Fixed meal pattern: yes  Patient counting carbs: sometimes  Using PUMP: no-- is not interested in pump.      DM Complications:   Complications:   Diabetes Complications  Description / Detail    Diabetic Retinopathy  No   CAD / PAD  No   Neuropathy  No   Nephropathy / Microalbuminuria  No  Lab Results   Component Value Date    UMALCR 5.55 07/06/2017         Gastroparesis  No   Hypoglycemia [place]  No          2. Hypertension: Blood Pressure today:   BP Readings from Last 3 Encounters:   07/06/17 108/72   09/29/16 108/68   12/16/15 108/72   On medications.  3. Hyperlipidemia:  On medications.  4. Chronic fatigue-  Interested to know testosterone levels.  PMH/PSH:  Past Medical History:   Diagnosis Date     Diabetic eye exam (H) 03/18/14     Hyperlipidemia LDL goal <100 10/31/2010     Type 1 diabetes, HbA1c goal < 7% (H) 10/31/2010     No past surgical history on file.  Family Hx:  No family history on file.        DM2:             Social Hx:  Social History     Social History     Marital status:      Spouse name: N/A     Number of children: N/A     Years of education: N/A     Occupational History     Not on file.     Social History Main Topics     Smoking status: Never  "Smoker     Smokeless tobacco: Not on file     Alcohol use No      Comment: none     Drug use: No     Sexual activity: Yes     Partners: Female     Other Topics Concern     Not on file     Social History Narrative          MEDICATIONS:  has a current medication list which includes the following prescription(s): humalog kwikpen, lisinopril, blood glucose monitoring, simvastatin, insulin glargine, amlodipine, blood glucose monitoring, blood glucose monitoring, b-d u/f, multivitamin, therapeutic with minerals, insulin syringe-needle u-100, aspirin, and fish oil.    ROS     ROS: 10 point ROS neg other than the symptoms noted above in the HPI.    Physical Exam   VS: /70  Pulse 68  Temp 98.4  F (36.9  C) (Oral)  Ht 1.803 m (5' 11\")  Wt 106.1 kg (233 lb 12.8 oz)  BMI 32.61 kg/m2  GENERAL: AXOX3, NAD, well dressed, answering questions appropriately, appears stated age.  HEENT: No exopthalmous, no proptosis, EOMI, no lig lag, no retraction  NECK: Thyroid normal in size, non tender, no nodules were palpated.  CV: RRR, no rubs, gallops, no murmurs  LUNGS: CTAB, no wheezes, rales, or ronchi  ABDOMEN: +BS  EXTREMITIES: no edema, +pulses, no rashes, no lesions  NEUROLOGY: CN grossly intact, + monofilament, + DTR upper and lower extremity, no tremors  DM FOOT EXAM: normal DP and PT pulses, no trophic changes or ulcerative lesions, normal sensory exam and normal monofilament exam.   MSK: grossly intact  SKIN: no rashes, no lesions  PSYCH: normal affect and mood      LABS:  A1c:  Lab Results   Component Value Date    A1C 6.7 07/06/2017    A1C 6.6 09/29/2016    A1C 6.8 03/30/2016    A1C 6.8 12/16/2015    A1C 7.2 01/28/2015         BMP:  Last Basic Metabolic Panel:  Lab Results   Component Value Date     07/06/2017      Lab Results   Component Value Date    POTASSIUM 4.0 07/06/2017     Lab Results   Component Value Date    CHLORIDE 109 07/06/2017     Lab Results   Component Value Date    MAGGIE 9.2 07/06/2017     Lab " Results   Component Value Date    CO2 28 07/06/2017     Lab Results   Component Value Date    BUN 21 07/06/2017     Lab Results   Component Value Date    CR 0.94 07/06/2017     Lab Results   Component Value Date    GLC 67 07/06/2017         Urine Micro:  Lab Results   Component Value Date    MICROL 14 07/06/2017     No results found for: MICROALBUMIN      LFTs/Lipids:  Recent Labs   Lab Test  07/06/17   0859  09/29/16   0748   01/28/15   1043  05/21/14   0843   CHOL  194  189   < >  173  206*   HDL  45  46   < >  54  45   LDL  127*  122*   < >  104  139*   TRIG  110  105   < >  75  112   CHOLHDLRATIO   --    --    --   3.2  4.6    < > = values in this interval not displayed.       Liver Function Studies -   Recent Labs   Lab Test  07/06/17   0859   PROTTOTAL  7.4   ALBUMIN  3.7   BILITOTAL  0.6   ALKPHOS  72   AST  16   ALT  37         TFTs:  Lab Results   Component Value Date    TSH 4.04 07/06/2017           Blood Glucose and pump data/ Meter reviewed.     All pertinent notes, labs, and images personally reviewed by me.     A/P  Mr.David INGRID Sharpe is a 47 year old here for the evaluation/management of diabetes:    1. DM1 - Under Good control. A1c  6.7%. No known complications form DM.  Historically blood glucose has been under good control.  Blood sugars are mostly in normal range.  He is checking multiple times during the day.  He is able to feel symptoms of hypoglycemia as well as he can tell if the blood sugar is rising higher.  Lowest blood sugar was 51.  He is not carbohydrate counting but he is estimating amount of insulin with each meal based on his experience and reports that he is pretty accurate about that.  Plan:  Given variability of the numbers as well as episodes of hypoglycemia I recommend to switch to Tresiba from lantus.  Stop Lantus  Start Tresiba 65 Units/day  humalog same dose 8/15/20 Units with breakfast/lunch/dinner repectively  Think about continuous glucose monitor.  Labs and follow up in 3  months  CDE referral- he would like to hold off  He is not interested in insulin pump or takes, at this time.  If he change his mind he will let me know and I can make referral for diabetic educator.    Recommend checking blood sugars before meals and at bedtime.    If Blood glucose are low more often-> 2-3 times/week- give us a call.  The patient is advised to Make better food choices: reduce carbs, Reduce portion size, weight loss and exercise 3-4 times a week.  Discussed hypoglycemia signs and symptoms as well as management in detail.      There is some variability among people, most will usually develop symptoms suggestive of hypoglycemia when blood glucose levels are lowered to the mid 60's. The first set of symptoms are called adrenergic. Patients may experience any of the following nervousness, sweating, intense hunger, trembling, weakness, palpitations, and difficulty speaking. When BS fall below 50 the patient is unable to talk and take oral therapy.  Would recommend Glucagon emergency kit for the patient and education for family and friends around the patient.   The acute management of hypoglycemia involves the rapid delivery of a source of easily absorbed sugar. Regular soda, juice, lifesavers, table sugar, are good options. 15 grams of glucose is the dose that is given, followed by an assessment of symptoms and a blood glucose check if possible. If after 10 minutes there is no improvement, another 10-15 grams should be given. This can be repeated up to three times. The equivalency of 10-15 grams of glucose (approximate servings) are: Four lifesavers, 4 teaspoons of sugar, or 1/2 cup or 4 oz of juice or regular pop.    2. Hypertension - Under Good  control.  Takes Amlodipine 5 mg, Lisinopril 20 mg      3. Hyperlipidemia - Under poor control. Takes Simvastatin 40 mg. , HDL 45.  Consider increasing the dose of statin.  4. Prevention  Flu Shot-  Pneumovax-   Opthalmology- 2017  ASA- 81 mg  Smoking-  no        Follow-up:  follow up in 3 month(s)    Jodi Yung M.D  Endocrinology  [facility]  CC: Kaleb Rosenthal    More than 50% of face to face time spent with Mr. Sharpe on counseling / coordinating his care.     All questions were answered.  The patient indicates understanding of the above issues and agrees with the plan set forth. 45 min.     Disclaimer: This note consists of symbols derived from keyboarding, dictation and/or voice recognition software. As a result, there may be errors in the script that have gone undetected. Please consider this when interpreting information found in this chart.

## 2018-03-08 LAB
SHBG SERPL-SCNC: 38 NMOL/L (ref 11–80)
TESTOST FREE SERPL-MCNC: 10.34 NG/DL (ref 4.7–24.4)
TESTOST SERPL-MCNC: 532 NG/DL (ref 240–950)

## 2018-03-12 ENCOUNTER — MYC MEDICAL ADVICE (OUTPATIENT)
Dept: ENDOCRINOLOGY | Facility: CLINIC | Age: 48
End: 2018-03-12

## 2018-03-13 NOTE — TELEPHONE ENCOUNTER
Prior Authorization Retail Medication Request    Medication/Dose: Tresiba    ICD code (if different than what is on RX): E10.9  Previously Tried and Failed: lantus   Rationale:Given variability of the numbers as well as episodes of hypoglycemia I recommend to switch to Tresiba from lantus.    Insurance Name: [facility] HE[employer] BENEFIT PLAN   Insurance ID: [id number]       Pharmacy Information (if different than what is on RX)  Name:   Phone:

## 2018-03-19 ENCOUNTER — TELEPHONE (OUTPATIENT)
Dept: PEDIATRICS | Facility: CLINIC | Age: 48
End: 2018-03-19

## 2018-03-19 NOTE — TELEPHONE ENCOUNTER
Prior Authorization Retail Medication Request     Medication/Dose: Tresiba                   ICD code (if different than what is on RX): E10.9  Previously Tried and Failed: lantus   Rationale:Given variability of the numbers as well as episodes of hypoglycemia I recommend to switch to Tresiba from lantus.     Insurance Name: [facility] HEALTH BENEFIT PLAN   Insurance ID: [id number]         Pharmacy Information (if different than what is on RX)  Name:   Phone:    Lee Ann Luciano CMA ([facility])

## 2018-03-22 ENCOUNTER — MYC MEDICAL ADVICE (OUTPATIENT)
Dept: ENDOCRINOLOGY | Facility: CLINIC | Age: 48
End: 2018-03-22

## 2018-03-22 DIAGNOSIS — E10.9 TYPE 1 DIABETES MELLITUS WITHOUT COMPLICATION (H): Primary | ICD-10-CM

## 2018-03-22 NOTE — TELEPHONE ENCOUNTER
Central Prior Authorization Team   Phone: [phone]      Prior Authorization Not Needed per Insurance    Medication: Tresiba - NOT NEEDED  Insurance Company: [employer] - Phone [phone] Fax [phone]  Expected CoPay:      Pharmacy Filling the Rx: [facility] - [place], [place] - [address] AT PORTAL TO REGISTERED [facility]  Pharmacy Notified:  YES

## 2018-03-26 NOTE — TELEPHONE ENCOUNTER
Can you check the status of Tresiba?  In the mean time I have sent a new Rx of lantus and pt is running out.  Lantus 54 Units AM and 15 Units PM    I will sent him Firework message.

## 2018-04-02 RX ORDER — LISINOPRIL 20 MG/1
20 TABLET ORAL 2 TIMES DAILY
Qty: 180 TABLET | Refills: 1 | Status: SHIPPED | OUTPATIENT
Start: 2018-04-02 | End: 2019-03-20

## 2018-04-02 RX ORDER — AMLODIPINE BESYLATE 5 MG/1
5 TABLET ORAL DAILY
Qty: 90 TABLET | Refills: 1 | Status: SHIPPED | OUTPATIENT
Start: 2018-04-02 | End: 2019-07-08

## 2018-04-02 RX ORDER — SIMVASTATIN 40 MG
40 TABLET ORAL AT BEDTIME
Qty: 90 TABLET | Refills: 1 | Status: SHIPPED | OUTPATIENT
Start: 2018-04-02 | End: 2019-05-27

## 2018-04-03 ENCOUNTER — MYC REFILL (OUTPATIENT)
Dept: ENDOCRINOLOGY | Facility: CLINIC | Age: 48
End: 2018-04-03

## 2018-04-03 DIAGNOSIS — E10.9 TYPE 1 DIABETES MELLITUS WITHOUT COMPLICATION (H): ICD-10-CM

## 2018-04-04 NOTE — TELEPHONE ENCOUNTER
Message from Hotelementshart:  Original authorizing provider: MD Aldo Holland would like a refill of the following medications:  insulin glargine (LANTUS SOLOSTAR) 100 UNIT/ML injection [Jodi Yung MD]    Preferred pharmacy: Other - [facility] home delivery.....not express scripts!!!!!!!!!    Comment:

## 2018-05-22 ENCOUNTER — TRANSFERRED RECORDS (OUTPATIENT)
Dept: HEALTH INFORMATION MANAGEMENT | Facility: CLINIC | Age: 48
End: 2018-05-22

## 2018-06-22 ENCOUNTER — MYC REFILL (OUTPATIENT)
Dept: ENDOCRINOLOGY | Facility: CLINIC | Age: 48
End: 2018-06-22

## 2018-06-22 DIAGNOSIS — E10.9 TYPE 1 DIABETES MELLITUS WITHOUT COMPLICATION (H): ICD-10-CM

## 2018-06-22 NOTE — TELEPHONE ENCOUNTER
Message from Mavizonhart:  Original authorizing provider: MD Aldo Holland would like a refill of the following medications:  insulin glargine (LANTUS SOLOSTAR) 100 UNIT/ML injection [Jodi Yung MD]    Preferred pharmacy: [facility] - [place], [place] - [address] AT PORTAL TO REGISTERED [facility] SIT[facility]    Comment:

## 2018-06-22 NOTE — TELEPHONE ENCOUNTER
"    Requested Prescriptions   Pending Prescriptions Disp Refills     insulin glargine (LANTUS SOLOSTAR) 100 UNIT/ML pen 30 mL 0     Sig: Lantus 54 Units AM and 15 Units PM    Long Acting Insulin Protocol Passed    6/22/2018 12:04 PM       Passed - Blood pressure less than 140/90 in past 6 months    BP Readings from Last 3 Encounters:   03/06/18 122/70   07/06/17 108/72   09/29/16 108/68                Passed - LDL on file in past 12 months    Recent Labs   Lab Test  07/06/17   0859   LDL  127*            Passed - Microalbumin on file in past 12 months    Recent Labs   Lab Test  07/06/17   0904  02/01/12   MICROALB   --    --   <3   MICROL  14   < >   --    UMALCR  5.55   < >   --     < > = values in this interval not displayed.            Passed - Serum creatinine on file in past 12 months    Recent Labs   Lab Test  07/06/17   0859   CR  0.94            Passed - HgbA1C in past 3 or 6 months    If HgbA1C is 8 or greater, it needs to be on file within the past 3 months.  If less than 8, must be on file within the past 6 months.     Recent Labs   Lab Test  03/06/18   0957   A1C  6.6*            Passed - Patient is age 18 or older       Passed - Recent (6 mo) or future (30 days) visit within the authorizing provider's specialty    Patient had office visit in the last 6 months or has a visit in the next 30 days with authorizing provider or within the authorizing provider's specialty.  See \"Patient Info\" tab in inbasket, or \"Choose Columns\" in Meds & Orders section of the refill encounter.              "

## 2018-07-10 ENCOUNTER — OFFICE VISIT (OUTPATIENT)
Dept: INTERNAL MEDICINE | Facility: CLINIC | Age: 48
End: 2018-07-10
Payer: COMMERCIAL

## 2018-07-10 VITALS
SYSTOLIC BLOOD PRESSURE: 138 MMHG | BODY MASS INDEX: 33.38 KG/M2 | RESPIRATION RATE: 14 BRPM | WEIGHT: 238.4 LBS | HEIGHT: 71 IN | DIASTOLIC BLOOD PRESSURE: 78 MMHG | TEMPERATURE: 97.7 F | OXYGEN SATURATION: 97 % | HEART RATE: 69 BPM

## 2018-07-10 DIAGNOSIS — E10.9 TYPE 1 DIABETES MELLITUS WITHOUT COMPLICATION (H): Primary | ICD-10-CM

## 2018-07-10 DIAGNOSIS — E78.5 HYPERLIPIDEMIA LDL GOAL <100: ICD-10-CM

## 2018-07-10 DIAGNOSIS — I10 ESSENTIAL HYPERTENSION, BENIGN: ICD-10-CM

## 2018-07-10 LAB
ALBUMIN SERPL-MCNC: 3.8 G/DL (ref 3.4–5)
ALP SERPL-CCNC: 64 U/L (ref 40–150)
ALT SERPL W P-5'-P-CCNC: 44 U/L (ref 0–70)
ANION GAP SERPL CALCULATED.3IONS-SCNC: 7 MMOL/L (ref 3–14)
AST SERPL W P-5'-P-CCNC: 22 U/L (ref 0–45)
BILIRUB SERPL-MCNC: 0.4 MG/DL (ref 0.2–1.3)
BUN SERPL-MCNC: 16 MG/DL (ref 7–30)
CALCIUM SERPL-MCNC: 8.9 MG/DL (ref 8.5–10.1)
CHLORIDE SERPL-SCNC: 107 MMOL/L (ref 94–109)
CHOLEST SERPL-MCNC: 169 MG/DL
CO2 SERPL-SCNC: 28 MMOL/L (ref 20–32)
CREAT SERPL-MCNC: 0.98 MG/DL (ref 0.66–1.25)
CREAT UR-MCNC: 154 MG/DL
GFR SERPL CREATININE-BSD FRML MDRD: 82 ML/MIN/1.7M2
GLUCOSE SERPL-MCNC: 97 MG/DL (ref 70–99)
HDLC SERPL-MCNC: 47 MG/DL
LDLC SERPL CALC-MCNC: 105 MG/DL
MICROALBUMIN UR-MCNC: 9 MG/L
MICROALBUMIN/CREAT UR: 6.1 MG/G CR (ref 0–17)
NONHDLC SERPL-MCNC: 122 MG/DL
POTASSIUM SERPL-SCNC: 3.9 MMOL/L (ref 3.4–5.3)
PROT SERPL-MCNC: 7.6 G/DL (ref 6.8–8.8)
SODIUM SERPL-SCNC: 142 MMOL/L (ref 133–144)
TRIGL SERPL-MCNC: 87 MG/DL

## 2018-07-10 PROCEDURE — 80053 COMPREHEN METABOLIC PANEL: CPT | Performed by: INTERNAL MEDICINE

## 2018-07-10 PROCEDURE — 82043 UR ALBUMIN QUANTITATIVE: CPT | Performed by: INTERNAL MEDICINE

## 2018-07-10 PROCEDURE — 99214 OFFICE O/P EST MOD 30 MIN: CPT | Performed by: INTERNAL MEDICINE

## 2018-07-10 PROCEDURE — 36415 COLL VENOUS BLD VENIPUNCTURE: CPT | Performed by: INTERNAL MEDICINE

## 2018-07-10 PROCEDURE — 80061 LIPID PANEL: CPT | Performed by: INTERNAL MEDICINE

## 2018-07-10 NOTE — PROGRESS NOTES
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  He has had a recheck his blood sugars as many as 15 times a day is coming home with sores on his feet which have now resolved due to the excess walking.  He has been seen in the endocrinology clinic in March of this year but as expected is a difficult time getting a follow-up appointment scheduled.    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 With walking and long shifts diabetes and been poorly controlled and he has needed to take days off from work to rest.     Problem list and histories reviewed & adjusted, as indicated.  Additional history: as documented    Patient Active Problem List   Diagnosis     HYPERLIPIDEMIA LDL GOAL <100     Type 1 diabetes mellitus without complication (H)     Essential hypertension, benign     Subclinical hypothyroidism     Chronic fatigue     History reviewed. No pertinent surgical history.    Social History   Substance Use Topics     Smoking status: Never Smoker     Smokeless tobacco: Never Used     Alcohol use No      Comment: none     History reviewed. No pertinent family history.      Current Outpatient Prescriptions   Medication Sig Dispense Refill     amLODIPine (NORVASC) 5 MG tablet Take 1 tablet (5 mg) by mouth daily 90 tablet 1     aspirin 81 MG tablet Take 1 tablet (81 mg) by mouth daily 100 tablet 3     B-D U/F insulin pen needle Use one Syringe 4 times daily or as directed 400 each prn     blood glucose monitoring (ONE TOUCH DELICA) lancets Use  to test blood sugars 4-8 times daily or as directed. 1 Box prn     blood glucose monitoring (ONE TOUCH VERIO IQ) test strip Use to test blood sugars 4-8 times daily or as directed. 400 strip PRN     blood glucose monitoring (ONETOUCH ULTRASMART) meter device kit 4-8 times per day 1 kit 0     Fish Oil OIL 3 capsules.       insulin degludec (TRESIBA) 200 UNIT/ML pen Inject 65 Units Subcutaneous daily 45 mL 1     insulin glargine (LANTUS SOLOSTAR) 100 UNIT/ML pen Lantus 54 Units AM and 15 Units PM 90 mL 0     insulin lispro (HUMALOG KWIKPEN) 100 UNIT/ML injection INJECT 10 UNITS UNDER THE SKIN BEFORE BREAKFAST, 15 UNITS BEFORE LUNCH, 25 UNITS BEFORE DINNER. About 75 UNITS/day 120 mL 0     lisinopril (PRINIVIL/ZESTRIL) 20 MG tablet Take 1 tablet (20 mg) by mouth 2 times daily 180 tablet 1     multivitamin, therapeutic with minerals (MULTI-VITAMIN) TABS Take 1 tablet by mouth daily 90 each 1     simvastatin (ZOCOR) 40 MG tablet Take 1 tablet (40 mg) by mouth At Bedtime 90 tablet 1     No Known Allergies  Recent Labs   Lab Test  03/06/18   0957  07/06/17   0859  09/29/16   0748   12/16/15   0825   A1C  6.6*  6.7*  6.6*   < >  6.8*   LDL   --   127*  122*   --   92   HDL   --   45  46   --   46   TRIG   --   110  105   --   92   ALT   --   37  38   --   44   CR   --   0.94  1.03   --   1.00   GFRESTIMATED   --   86  78   --   81   GFRESTBLACK   --   >90   GFR Calc    >90   GFR Calc     --   >90   GFR Calc     POTASSIUM   --   4.0  4.2   --   4.4   TSH  2.28  4.04*  3.31   --   3.09    < > = values in this interval not displayed.      Labs reviewed in EPIC    Reviewed and updated as needed this visit by clinical staff  Tobacco  Allergies  Meds  Problems  Med Hx  Surg Hx  Fam Hx  Soc Hx        Reviewed and updated as needed this visit by Provider       ROS:  CONSTITUTIONAL: NEGATIVE for fever, chills, change in weight  ENT/MOUTH: NEGATIVE for ear, mouth and throat  "problems  RESP: NEGATIVE for significant cough or SOB  CV: NEGATIVE for chest pain, palpitations or peripheral edema  GI: NEGATIVE for nausea, abdominal pain, heartburn, or change in bowel habits  : NEGATIVE for frequency, dysuria, or hematuria  MUSCULOSKELETAL: NEGATIVE for significant arthralgias or myalgia  HEME: NEGATIVE for bleeding problems  PSYCHIATRIC: NEGATIVE for changes in mood or affect    OBJECTIVE:                                                    /78  Pulse 69  Temp 97.7  F (36.5  C) (Oral)  Resp 14  Ht 5' 11\" (1.803 m)  Wt 238 lb 6.4 oz (108.1 kg)  SpO2 97%  BMI 33.25 kg/m2  Body mass index is 33.25 kg/(m^2).  GENERAL:  alert and no distress  RESP: lungs clear to auscultation - no rales, no rhonchi, no wheezes  CV: regular rates and rhythm, normal S1 S2, no S3 or S4 and no click or rub  MS: extremities- no gross deformities noted  PSYCH: Alert and oriented times 3; speech- coherent , normal rate and volume; able to articulate logical thoughts, able to abstract reason, no tangential thoughts, no hallucinations or delusions, affect- normal     ASSESSMENT/PLAN:                                                      (E10.9) Type 1 diabetes mellitus without complication (H)  (primary encounter diagnosis)  Comment: Appears appropriate to write a FMLA form for patient in regards to work restrictions.  This was filled out on his behalf with his comment that he would be happy with 10 hours per days but no more than 6 days per week with occasional time off only as needed.  Plan: Albumin Random Urine Quantitative with Creat         Ratio, Comprehensive metabolic panel        He was advised that he may want to follow-up with endocrinology to determine whether not an insulin pump or some form of continuous glucose monitoring may be best suited for his current working environment.    (I10) Essential hypertension, benign  Comment: Stable on current therapy continue with medication as ordered  Plan: "     (E78.5) Hyperlipidemia LDL goal <100  Comment: Patient states he is compliant with statin therapy but will recheck labs  LDL Cholesterol Calculated   Date Value Ref Range Status   07/06/2017 127 (H) <100 mg/dL Final     Comment:     Above desirable:  100-129 mg/dl   Borderline High:  130-159 mg/dL   High:             160-189 mg/dL   Very high:       >189 mg/dl       Plan: Lipid panel reflex to direct LDL Fasting        Labs ordered and advised his repeat needed.    See Patient Instructions    Kaleb Rosenthal MD  [facility]    25 minutes spent with this patient, face to face, discussing treatment options for listed problems above as well as side effects of appropriate medications.  Counseling time extended beyond 50% of the clinic visit.  Medication dosing, treatment plan and follow-up were discussed. Also reviewed need for primary care testing for patient.

## 2018-07-10 NOTE — LETTER
[facility]  [address] [address]  [phone]      7/10/2018       Aldo Sharpe  [address] [place] [id number]        Dear Aldo,    I am pleased to inform you that your routine blood work including your sodium, potassium, calcium, kidney and liver function tests are all normal.    Your LDL cholesterol is slightly high considering your diabetes and should be less than 100 and preferably around 70 and could be treated more aggressively.  Please follow-up with me to discuss your further options if you are interested and make sure that you are consistently taking your simvastatin.    Sincerely,      Kaleb Rosenthal MD  Internal Medicine

## 2018-07-10 NOTE — MR AVS SNAPSHOT
After Visit Summary   7/10/2018    Aldo Sharpe    MRN: [id number]           Patient Information     Date Of Birth          1970        Visit Information        Provider Department      7/10/2018 8:40 AM Kaleb Rosenthal MD [facility]        Today's Diagnoses     Type 1 diabetes mellitus without complication (H)    -  1    Essential hypertension, benign        Hyperlipidemia LDL goal <100           Follow-ups after your visit        Follow-up notes from your care team     Return in about 6 months (around 1/10/2019), or if symptoms worsen or fail to improve, for Lab Work, Routine Visit.      Who to contact     If you have questions or need follow up information about today's clinic visit or your schedule please contact [facility] directly at [phone].  Normal or non-critical lab and imaging results will be communicated to you by MyChart, letter or phone within 4 business days after the clinic has received the results. If you do not hear from us within 7 days, please contact the clinic through MyChart or phone. If you have a critical or abnormal lab result, we will notify you by phone as soon as possible.  Submit refill requests through [employer] or call your pharmacy and they will forward the refill request to us. Please allow 3 business days for your refill to be completed.          Additional Information About Your Visit        MyChart Information     [employer] gives you secure access to your electronic health record. If you see a primary care provider, you can also send messages to your care team and make appointments. If you have questions, please call your primary care clinic.  If you do not have a primary care provider, please call [phone] and they will assist you.        Care EveryWhere ID     This is your Care EveryWhere ID. This could be used by other organizations to access your [place] medical records  [id number]        Your  "Vitals Were     Pulse Temperature Respirations Height Pulse Oximetry BMI (Body Mass Index)    69 97.7  F (36.5  C) (Oral) 14 5' 11\" (1.803 m) 97% 33.25 kg/m2       Blood Pressure from Last 3 Encounters:   07/10/18 138/78   03/06/18 122/70   07/06/17 108/72    Weight from Last 3 Encounters:   07/10/18 238 lb 6.4 oz (108.1 kg)   03/06/18 233 lb 12.8 oz (106.1 kg)   07/06/17 234 lb 3.2 oz (106.2 kg)              We Performed the Following     Albumin Random Urine Quantitative with Creat Ratio     Comprehensive metabolic panel     Lipid panel reflex to direct LDL Fasting        Primary Care Provider Office Phone # Fax #    Kaleb Rosenthal [phone]       [address] [place] [phone]        Equal Access to Services     DINA PEREZ : Hadii aad ku hadasho Soomaali, waaxda luqadaha, qaybta kaalmada adeegyada, waxay idiin hayaan wagner cartwrightaraelif earln . So [facility] [phone].    ATENCIÓN: Si habla español, tiene a olson disposición servicios gratuitos de asistencia lingüística. Luciano al [phone].    We comply with applicable federal civil rights laws and [place] laws. We do not discriminate on the basis of race, color, national origin, age, disability, sex, sexual orientation, or gender identity.            Thank you!     Thank you for choosing [facility]  for your care. Our goal is always to provide you with excellent care. Hearing back from our patients is one way we can continue to improve our services. Please take a few minutes to complete the written survey that you may receive in the mail after your visit with us. Thank you!             Your Updated Medication List - Protect others around you: Learn how to safely use, store and throw away your medicines at www.disposemymeds.org.          This list is accurate as of 7/10/18  9:02 AM.  Always use your most recent med list.                   Brand Name Dispense Instructions for use Diagnosis    amLODIPine 5 MG " tablet    NORVASC    90 tablet    Take 1 tablet (5 mg) by mouth daily    Essential hypertension, benign       aspirin 81 MG tablet     100 tablet    Take 1 tablet (81 mg) by mouth daily    Type 1 diabetes, HbA1c goal < 8% (H)       B-D U/F 31G X 5 MM   Generic drug:  insulin pen needle     400 each    Use one Syringe 4 times daily or as directed    Type 2 diabetes mellitus (H)       blood glucose monitoring lancets     1 Box    Use to test blood sugars 4-8 times daily or as directed.    Type 1 diabetes, HbA1c goal < 7% (H)       blood glucose monitoring meter device kit     1 kit    4-8 times per day    Type 1 diabetes, HbA1c goal < 8% (H)       blood glucose monitoring test strip    ONETOUCH VERIO IQ    400 strip    Use to test blood sugars 4-8 times daily or as directed.    Type 1 diabetes mellitus without complication (H)       Fish Oil Oil      3 capsules.        insulin degludec 200 UNIT/ML pen    TRESIBA    45 mL    Inject 65 Units Subcutaneous daily    Type 1 diabetes mellitus without complication (H)       insulin glargine 100 UNIT/ML injection    LANTUS SOLOSTAR    90 mL    Lantus 54 Units AM and 15 Units PM    Type 1 diabetes mellitus without complication (H)       insulin lispro 100 UNIT/ML injection    HumaLOG KWIKpen    120 mL    INJECT 10 UNITS UNDER THE SKIN BEFORE BREAKFAST, 15 UNITS BEFORE LUNCH, 25 UNITS BEFORE DINNER. About 75 UNITS/day    Type 1 diabetes mellitus without complication (H)       lisinopril 20 MG tablet    PRINIVIL/ZESTRIL    180 tablet    Take 1 tablet (20 mg) by mouth 2 times daily    Type 1 diabetes mellitus without complication (H), Essential hypertension, benign       multivitamin, therapeutic with minerals Tabs tablet     90 each    Take 1 tablet by mouth daily    Type 1 diabetes mellitus without complication (H)       simvastatin 40 MG tablet    ZOCOR    90 tablet    Take 1 tablet (40 mg) by mouth At Bedtime    Hyperlipidemia LDL goal <100

## 2018-09-13 ENCOUNTER — TELEPHONE (OUTPATIENT)
Dept: INTERNAL MEDICINE | Facility: CLINIC | Age: 48
End: 2018-09-13

## 2018-09-13 NOTE — TELEPHONE ENCOUNTER
Reason for call:  Form   Our goal is to have forms completed within 72 hours, however some forms may require a visit or additional information.     Who is the form from? [place] [employer] (if other please explain)  Where did the form come from? Patient or family brought in     What [place] [facility]  Where was the form placed? Pcp's Folder  What number is listed as a contact on the form? [phone]    Phone call message - patient request for a letter, form or note:     Date needed: as soon as possible  Please fax to [phone]  Has the patient signed a consent form for release of information?     Additional comments: Fax form to [phone] and mail to pts home address.    Type of letter, form or note: Insulin-Treated Diabetes Mellitus Report    Phone number to reach patient:  Cell number on file:    Telephone Information:   Mobile [phone]       Best Time:      Can we leave a detailed message on this number?

## 2018-09-18 NOTE — TELEPHONE ENCOUNTER
Form [place] [employer] insulin-treated diabetes mellitus report completed, copy for chart, original mailed to pt, and faxed to FAX: [phone]

## 2018-09-21 ENCOUNTER — MYC REFILL (OUTPATIENT)
Dept: INTERNAL MEDICINE | Facility: CLINIC | Age: 48
End: 2018-09-21

## 2018-09-21 ENCOUNTER — MYC REFILL (OUTPATIENT)
Dept: ENDOCRINOLOGY | Facility: CLINIC | Age: 48
End: 2018-09-21

## 2018-09-21 DIAGNOSIS — E10.9 TYPE 1 DIABETES MELLITUS WITHOUT COMPLICATION (H): ICD-10-CM

## 2018-09-24 NOTE — TELEPHONE ENCOUNTER
Message from [employer]:  Original authorizing provider: MD Aldo Holland would like a refill of the following medications:  insulin glargine (LANTUS SOLOSTAR) 100 UNIT/ML pen [Jodi Yung MD]    Preferred pharmacy: [facility] - [place], [place] - [address] AT PORTAL TO REGISTERED [facility] SITES    Comment:      Medication renewals requested in this message routed to other providers:  blood glucose monitoring (ONE TOUCH VERIO IQ) test strip [Kaleb Rosenthal MD]

## 2018-09-24 NOTE — TELEPHONE ENCOUNTER
Message from [email or web address]:  Original authorizing provider: MD Aldo Mascorro INGRIDDede Guevaramckenzie would like a refill of the following medications:  blood glucose monitoring (ONE TOUCH VERIO IQ) test strip [Kaleb Rosenthal MD]    Preferred pharmacy: [facility] - [place], [place] - [address] AT PORTAL TO REGISTERED [facility] SITES    Comment:      Medication renewals requested in this message routed to other providers:  insulin glargine (LANTUS SOLOSTAR) 100 UNIT/ML pen [Jodi Yung MD]

## 2018-09-24 NOTE — TELEPHONE ENCOUNTER
Lab Results   Component Value Date    A1C 6.6 03/06/2018    A1C 6.7 07/06/2017    A1C 6.6 09/29/2016    A1C 6.8 03/30/2016    A1C 6.8 12/16/2015     Lov:  03/06/18

## 2018-09-24 NOTE — TELEPHONE ENCOUNTER
Rx sent.    Due for clinic visit.  Last clinic visit was 3/2018  Please send reminder letter.  Needs clinic visit before further refills.

## 2018-09-24 NOTE — TELEPHONE ENCOUNTER
"Requested Prescriptions   Pending Prescriptions Disp Refills     blood glucose monitoring (ONETOUCH VERIO IQ) test strip 400 strip PRN     Sig: Use to test blood sugars 4-8 times daily or as directed.    Diabetic Supplies Protocol Passed    9/24/2018  8:45 AM       Passed - Patient is 18 years of age or older       Passed - Recent (6 mo) or future (30 days) visit within the authorizing provider's specialty    Patient had office visit in the last 6 months or has a visit in the next 30 days with authorizing provider.  See \"Patient Info\" tab in inbasket, or \"Choose Columns\" in Meds & Orders section of the refill encounter.          Last Written Prescription Date:  7/6/2017  Last Fill Quantity: 400,  # refills: PRN   Last office visit: 7/10/2018 with prescribing provider: Kaleb Rosenthal     Future Office Visit:      Prescription approved per [facility] Refill Protocol.    Imelda JACKSON RN, BSN, PHN      "

## 2018-10-02 DIAGNOSIS — E10.9 TYPE 1 DIABETES MELLITUS WITHOUT COMPLICATION (H): ICD-10-CM

## 2018-10-02 NOTE — TELEPHONE ENCOUNTER
"Requested Prescriptions   Pending Prescriptions Disp Refills     insulin glargine (LANTUS SOLOSTAR) 100 UNIT/ML pen  Last Written Prescription Date:  9/24/18  Last Fill Quantity: 30 mL,  # refills: 2   Last office visit: 7/10/2018   Future Office Visit:     30 mL 2     Sig: Lantus 54 Units AM and 15 Units PM    Long Acting Insulin Protocol Failed    10/2/2018  9:52 AM       Failed - HgbA1C in past 3 or 6 months    If HgbA1C is 8 or greater, it needs to be on file within the past 3 months.  If less than 8, must be on file within the past 6 months.     Recent Labs   Lab Test  03/06/18   0957   A1C  6.6*            Passed - Blood pressure less than 140/90 in past 6 months    BP Readings from Last 3 Encounters:   07/10/18 138/78   03/06/18 122/70   07/06/17 108/72                Passed - LDL on file in past 12 months    Recent Labs   Lab Test  07/10/18   0859   LDL  105*            Passed - Microalbumin on file in past 12 months    Recent Labs   Lab Test  07/10/18   0859  02/01/12   MICROALB   --    --   <3   MICROL  9   < >   --    UMALCR  6.10   < >   --     < > = values in this interval not displayed.            Passed - Serum creatinine on file in past 12 months    Recent Labs   Lab Test  07/10/18   0859   CR  0.98            Passed - Patient is age 18 or older       Passed - Recent (6 mo) or future (30 days) visit within the authorizing provider's specialty    Patient had office visit in the last 6 months or has a visit in the next 30 days with authorizing provider or within the authorizing provider's specialty.  See \"Patient Info\" tab in inbasket, or \"Choose Columns\" in Meds & Orders section of the refill encounter.              "

## 2018-10-15 ENCOUNTER — MEDICAL CORRESPONDENCE (OUTPATIENT)
Dept: HEALTH INFORMATION MANAGEMENT | Facility: CLINIC | Age: 48
End: 2018-10-15

## 2018-10-18 DIAGNOSIS — E78.5 HYPERLIPIDEMIA LDL GOAL <100: ICD-10-CM

## 2018-10-18 DIAGNOSIS — E10.9 TYPE 1 DIABETES MELLITUS WITHOUT COMPLICATION (H): ICD-10-CM

## 2018-10-18 DIAGNOSIS — I10 ESSENTIAL HYPERTENSION, BENIGN: ICD-10-CM

## 2018-10-18 RX ORDER — SIMVASTATIN 40 MG
TABLET ORAL
Qty: 90 TABLET | Refills: 1 | Status: SHIPPED | OUTPATIENT
Start: 2018-10-18 | End: 2019-03-20

## 2018-10-18 RX ORDER — LISINOPRIL 20 MG/1
TABLET ORAL
Qty: 180 TABLET | Refills: 1 | Status: SHIPPED | OUTPATIENT
Start: 2018-10-18 | End: 2019-04-23

## 2018-10-18 NOTE — TELEPHONE ENCOUNTER
"Requested Prescriptions   Pending Prescriptions Disp Refills     simvastatin (ZOCOR) 40 MG tablet [Pharmacy Med Name: SIMVASTATIN  TAB 40MG] 90 tablet 1     Sig: TAKE 1 TABLET AT BEDTIME    Statins Protocol Passed    10/18/2018 12:05 PM       Passed - LDL on file in past 12 months    Recent Labs   Lab Test  07/10/18   0859   LDL  105*            Passed - No abnormal creatine kinase in past 12 months    No lab results found.            Passed - Recent (12 mo) or future (30 days) visit within the authorizing provider's specialty    Patient had office visit in the last 12 months or has a visit in the next 30 days with authorizing provider or within the authorizing provider's specialty.  See \"Patient Info\" tab in inbasket, or \"Choose Columns\" in Meds & Orders section of the refill encounter.             Passed - Patient is age 18 or older        lisinopril (PRINIVIL/ZESTRIL) 20 MG tablet [Pharmacy Med Name: LISINOPRIL TAB 20MG] 180 tablet 1     Sig: TAKE 1 TABLET TWICE A DAY    ACE Inhibitors (Including Combos) Protocol Passed    10/18/2018 12:05 PM       Passed - Blood pressure under 140/90 in past 12 months    BP Readings from Last 3 Encounters:   07/10/18 138/78   03/06/18 122/70   07/06/17 108/72                Passed - Recent (12 mo) or future (30 days) visit within the authorizing provider's specialty    Patient had office visit in the last 12 months or has a visit in the next 30 days with authorizing provider or within the authorizing provider's specialty.  See \"Patient Info\" tab in inbasket, or \"Choose Columns\" in Meds & Orders section of the refill encounter.             Passed - Patient is age 18 or older       Passed - Normal serum creatinine on file in past 12 months    Recent Labs   Lab Test  07/10/18   0859   CR  0.98            Passed - Normal serum potassium on file in past 12 months    Recent Labs   Lab Test  07/10/18   0859   POTASSIUM  3.9             "

## 2018-10-18 NOTE — TELEPHONE ENCOUNTER
Prescription approved per [facility] Refill Protocol.    Imelda JACKSON RN, BSN, PHN

## 2018-10-29 ENCOUNTER — MEDICAL CORRESPONDENCE (OUTPATIENT)
Dept: HEALTH INFORMATION MANAGEMENT | Facility: CLINIC | Age: 48
End: 2018-10-29

## 2018-11-26 ENCOUNTER — MYC REFILL (OUTPATIENT)
Dept: INTERNAL MEDICINE | Facility: CLINIC | Age: 48
End: 2018-11-26

## 2018-11-26 DIAGNOSIS — E78.5 HYPERLIPIDEMIA LDL GOAL <100: Primary | ICD-10-CM

## 2018-11-27 RX ORDER — OMEGA-3 FATTY ACIDS/FISH OIL 300-1000MG
3 CAPSULE ORAL DAILY
Qty: 90 CAPSULE | Refills: 11 | Status: SHIPPED | OUTPATIENT
Start: 2018-11-27 | End: 2023-02-22

## 2018-11-27 NOTE — TELEPHONE ENCOUNTER
Requested Prescriptions   Pending Prescriptions Disp Refills     Fish Oil OIL  Last Written Prescription Date:    Last Fill Quantity: ,   # refills:   Last Office Visit: 07/10/2018  Future Office visit:       Routing refill request to provider for review/approval because:  Medication is reported/historical         Sig: 3 capsules    There is no refill protocol information for this order

## 2018-11-27 NOTE — TELEPHONE ENCOUNTER
Message from [employer]:  Original authorizing provider: MD Aldo Mascorro would like a refill of the following medications:  Fish Oil OIL [Kaleb Rosenthal MD]    Preferred pharmacy: [facility] - [place], [place] - [address] AT PORTAL TO REGISTERED [facility] SITES    Comment:  Hello I need a prescription for humalog vial insulin. Via recommendation from Dr. Rosenthal I have finally switched to the medtronic pump and I have to have vial humalog to fill the pump. Please fill for a 3 month usage as was needed with the quick pen. Please note I do not need fish oil.

## 2018-12-07 ENCOUNTER — TELEPHONE (OUTPATIENT)
Dept: INTERNAL MEDICINE | Facility: CLINIC | Age: 48
End: 2018-12-07

## 2018-12-07 DIAGNOSIS — E10.9 TYPE 1 DIABETES MELLITUS WITHOUT COMPLICATION (H): Primary | ICD-10-CM

## 2018-12-07 NOTE — TELEPHONE ENCOUNTER
Switching insulin therapy to the insulin pump.    Needs a vial of humalog, not the quick pen.  Needs a contour next test strip.    1 month supply called in to Annette Mitchell by Monday so he can get quickly.  [phone]    For all the following months, refill for 3 months to [facility] mail pharmacy.    [phone], ok to lv detailed message.

## 2018-12-11 ENCOUNTER — MYC MEDICAL ADVICE (OUTPATIENT)
Dept: ENDOCRINOLOGY | Facility: CLINIC | Age: 48
End: 2018-12-11

## 2018-12-11 ENCOUNTER — TELEPHONE (OUTPATIENT)
Dept: ENDOCRINOLOGY | Facility: CLINIC | Age: 48
End: 2018-12-11

## 2018-12-11 NOTE — TELEPHONE ENCOUNTER
[facility] [place]    3 month pended for [facility].    Anna Merlos, CMA

## 2018-12-11 NOTE — TELEPHONE ENCOUNTER
Panel Management Review      Patient has the following on his problem list:     Diabetes    ASA: Passed    Last A1C  Lab Results   Component Value Date    A1C 6.6 03/06/2018    A1C 6.7 07/06/2017    A1C 6.6 09/29/2016    A1C 6.8 03/30/2016    A1C 6.8 12/16/2015     A1C tested: FAILED    Last LDL:    Lab Results   Component Value Date    CHOL 169 07/10/2018     Lab Results   Component Value Date    HDL 47 07/10/2018     Lab Results   Component Value Date     07/10/2018     Lab Results   Component Value Date    TRIG 87 07/10/2018     Lab Results   Component Value Date    CHOLHDLRATIO 3.2 01/28/2015     Lab Results   Component Value Date    NHDL 122 07/10/2018       Is the patient on a Statin? YES             Is the patient on Aspirin? YES    Medications     HMG CoA Reductase Inhibitors    simvastatin (ZOCOR) 40 MG tablet    simvastatin (ZOCOR) 40 MG tablet    Salicylates    aspirin 81 MG tablet          Last three blood pressure readings:  BP Readings from Last 3 Encounters:   07/10/18 138/78   03/06/18 122/70   07/06/17 108/72       Date of last diabetes office visit: 03/06/18     Tobacco History:     History   Smoking Status     Never Smoker   Smokeless Tobacco     Never Used           Composite cancer screening  Chart review shows that this patient is due/due soon for the following None  Summary:    Patient is due/failing the following:   FOLLOW UP    Action needed:   Patient needs office visit for dm with endo.    Type of outreach:    Sent [employer] message.    Questions for provider review:    None                                                                                                                                    Anna Merlos CMA       Chart routed to no one .

## 2018-12-11 NOTE — TELEPHONE ENCOUNTER
Received message 12/11/2018  Endo staff- Please pend the orders with correct quantity, select pharmacy and then send for signature. Also associate with correct diagnosis.    Thank you.    Jodi Yung

## 2018-12-11 NOTE — TELEPHONE ENCOUNTER
Please try to call patient and get an update.  If he is in need of medication we need to send it sooner.

## 2018-12-11 NOTE — TELEPHONE ENCOUNTER
I do not see any CDE visit or CE note.  Who is managing pump settings?  What is TDD of insulin for him?  Please check with pt.  Pended meds are based on previous note when he was using injections for long and short acting insulin.    Let me know if you have any questions.

## 2018-12-13 ENCOUNTER — TELEPHONE (OUTPATIENT)
Dept: INTERNAL MEDICINE | Facility: CLINIC | Age: 48
End: 2018-12-13

## 2018-12-13 DIAGNOSIS — E10.9 TYPE 1 DIABETES MELLITUS WITHOUT COMPLICATION (H): Primary | ICD-10-CM

## 2018-12-13 NOTE — TELEPHONE ENCOUNTER
See another message from Dotty.  Started insulin pump- was seen by [employer] rep.  Kaleb Alex  Has signed insulin supplies.  Please check and confirm with pt. Let me know if he needs any other supplies.

## 2018-12-13 NOTE — TELEPHONE ENCOUNTER
Yes I need a prescription for a 3 month supply of insulin, test strips and all the other parts used with the pump.  I sent a request for the insulin and the test strips but have heard nothing as weather they have been submitted to care [facility] on my behalf.  Any info would be great! Thanks Pedro

## 2018-12-13 NOTE — TELEPHONE ENCOUNTER
Please see another encounter.  Please check with patient if he needs any other supplies form us.  Then close this encoutner.

## 2018-12-17 DIAGNOSIS — E10.9 TYPE 1 DIABETES MELLITUS WITHOUT COMPLICATION (H): Primary | ICD-10-CM

## 2018-12-17 NOTE — TELEPHONE ENCOUNTER
Rx sent.    Insulin and test strips.  Please make sure that he gets glucometer compatible test strips.  Usually insulin pump supplies goes through [employer].  Please check with patient if [employer] is working on that or if he needs heard from us.

## 2018-12-17 NOTE — TELEPHONE ENCOUNTER
He is on insulin pump- just started.   Need to know TDD- he only needs short acting insulin.    Let me know if you have any questions.

## 2018-12-20 ENCOUNTER — ALLIED HEALTH/NURSE VISIT (OUTPATIENT)
Dept: EDUCATION SERVICES | Facility: CLINIC | Age: 48
End: 2018-12-20
Payer: COMMERCIAL

## 2018-12-20 DIAGNOSIS — E10.9 TYPE 1 DIABETES MELLITUS WITHOUT COMPLICATION (H): Primary | ICD-10-CM

## 2018-12-20 PROCEDURE — G0108 DIAB MANAGE TRN  PER INDIV: HCPCS

## 2018-12-20 NOTE — PATIENT INSTRUCTIONS
1. Auto Mode education completed today.  If Auto Mode not working well, ok to return to Manual Mode.  2. Upload to [facility] in 1 week.  3. Appointment with Endocrinology.  ?U200 insulin in pump.  ?Metformin.

## 2018-12-20 NOTE — PROGRESS NOTES
Diabetes Self-Management Education & Support      Diabetes Self-Management Education & Support - Insulin Pump Review    SUBJECTIVE/OBJECTIVE  Presents for: Follow-up  Accompanied by: Self  Diabetes education in the past 24mo: No  Focus of Visit: Insulin Pump  Diabetes type: Type 1  Disease course: Stable  Diabetes management related comments/concerns: No  Other concerns:: None  Cultural Influences/Ethnic Background:  American        Patient seen today for Insulin Pump Review:    Insulin Pump Information  Insulin Pump Type: Medtronic Minimed 670G System  Pump Serial Number: [id number]  Infusion Set: Medtronic  Medtronic Infusion Set: [place]    Insulin Pump Review  Insulin Pump Type: Medtronic Minimed 670G System  Taking other diabetes medications?: No  Patient understands DKA prevention: Yes  Patient has ketone test strips: No  Adjustment for exercise: Uses temporary basal during exercise  Changes made to pump settings: None  Changes made to sensor settings: None  Education specific to insulin pump provided today: Other  Attempting to begin Automode on 670G today?: Yes  Calibrating appropriately?: Yes  Post-meal Blood Glucose within target?: Yes  Carbs entered correctly?: Yes  Correction bolus achieves target?: Yes  Boluses given pre-meal/snack?: Yes  Bolus ratio (%): 59 %  Basal ratio (%): 41 %  Automode training steps completed?: Yes  Patient successfully entered Automode without difficulty?: Yes                      Healthy Eating  Healthy Eating Assessed Today: Yes  Cultural/Pentecostalism diet restrictions?: No  Patient on a regular basis: Counts carbohydrates  Meal planning: Avoiding sweets, Carbohydrate counting, Plate planning method  Meals include: Breakfast, Lunch, Dinner, Snacks  Beverages: Water, Tea, Diet soda, Coffee drinks  Has patient met with a dietitian in the past?: No    Being Active  Being Active Assessed Today: Yes  Exercise:: Yes  Days per week of moderate to strenuous exercise (like a brisk walk):  5  How intense was your typical exercise? : Moderate (like brisk walking)  Barrier to exercise: None    Monitoring  Monitoring Assessed Today: Yes  Did patient bring glucose meter to appointment? : Yes  Blood Glucose Meter: ContourNext, One Touch  Home Glucose (Sugar) Monitorin+ times per day  Blood glucose trend: Fluctuating minimally  Low Glucose Range (mg/dL): 130-140  High Glucose Range (mg/dL): >200  Overall Range (mg/dL): 110-130        Taking Medications  Diabetes Medication(s)     Insulin Sig    insulin degludec (TRESIBA) 200 UNIT/ML pen Inject 65 Units Subcutaneous daily    insulin glargine (LANTUS SOLOSTAR) 100 UNIT/ML pen Lantus 54 Units AM and 15 Units PM    insulin lispro (HUMALOG KWIKPEN) 100 UNIT/ML injection INJECT 10 UNITS UNDER THE SKIN BEFORE BREAKFAST, 15 UNITS BEFORE LUNCH, 25 UNITS BEFORE DINNER. About 75 UNITS/day    insulin lispro (HUMALOG VIAL) 100 UNIT/ML vial About 150 Units with insulin pump.          Taking Medication Assessed Today: Yes  Current Treatments: Insulin Pump  Dose schedule: pre-breakfast, pre-lunch, pre-dinner  Injection/Infusion sites: Abdomen, Buttocks, Thighs  Treatment Compliance: All of the time    Problem Solving  Problem Solving Assessed Today: Yes  Hypoglycemia Frequency: Rarely  Hypoglycemia Treatment: Juice, Candy, Other food  Patient carries a carbohydrate source: Yes  Medical alert: Yes  Severe weather/disaster plan for diabetes management?: Yes  DKA prevention plan?: Yes  Sick day plan for diabetes management?: (P) No         Hypoglycemia Complications  Blackouts: No  Hospitalization: No  Nocturnal hypoglycemia: No  Required assistance: No  Required glucagon injection: No  Seizures: No    Reducing Risks  Reducing Risks Assessed Today: No  CAD Risks: Diabetes Mellitus, Family history, Obesity, Stress  Has dilated eye exam at least once a year?: Yes  Sees dentist every 6 months?: Yes  Sees podiatrist (foot doctor)?: No    Healthy Coping  Healthy Coping  Assessed Today: No  Informal Support system:: Children, Family, Friends, Significant other  Stage of change: ACTION (Actively working towards change)  Difficulty affording diabetes management supplies?: No  Patient Activation Measure Survey Score:  MASSIMO Score (Last Two) 10/18/2011   MASSIMO Raw Score 52   Activation Score 100   MASSIMO Level 4         ASSESSMENT  Patient ready to enter Auto Mode today.  No changes made to pump settings.    INTERVENTION:   Diabetes knowledge and skills assessment:     Patient is knowledgeable in diabetes management concepts related to: Healthy Eating, Being Active, Monitoring, Taking Medication and Problem Solving    Patient needs further education on the following diabetes management concepts: Taking Medication    Based on learning assessment above, most appropriate setting for further diabetes education would be: Individual setting.    Education provided today on:  AADE Self-Care Behaviors:  Auto Mode    Opportunities for ongoing education and support in diabetes-self management were discussed.    Pt verbalized understanding of concepts discussed and recommendations provided today.       Education Materials Provided:  No new materials provided today      PLAN  See Patient Instructions for co-developed, patient-stated behavior change goals.  AVS printed and provided to patient today. See Follow-Up section for recommended follow-up.    NILDA Bradshaw CDE    Time Spent: 60 minutes  Encounter Type: Individual    Any diabetes medication dose changes were made via the CDE Protocol and Collaborative Practice Agreement with the patient's endocrinology provider. A copy of this encounter was shared with the provider.

## 2018-12-26 DIAGNOSIS — E10.9 TYPE 1 DIABETES MELLITUS WITHOUT COMPLICATION (H): Primary | ICD-10-CM

## 2018-12-26 NOTE — TELEPHONE ENCOUNTER
----- Message from Dotty Almonte RD sent at 12/26/2018  2:15 PM CST -----  If you're comfortable prescribing, Aldo would be interested in trying this to see if it helps his BGs.  He uses the [facility] mailorder pharmacy. He has a f/u appt made for March.    Thanks!  NILDA Bradshaw CDE      ----- Message -----  From: Jodi Yung MD  Sent: 12/26/2018   1:52 PM  To: Dotty Almonte RD    Hi    Can use metformin as insulin sensitizer in type 1 DM.  Last visit with me 3/2018.  Let me know if you have any questions.    Jodi Yung       ----- Message -----  From: Dotty Almonte RD  Sent: 12/26/2018   8:07 AM  To: Jodi Yung MD    Good morning,    What are your thoughts on adding Metformin for a Type 1?  Aldo had a pump f/u with me last week.  He's doing fairly well, but uses a large amount of insulin and struggles with a dramatic morning BG rise.  We started Auto Mode with his 670 pump, but I'm honestly not sure if it will work well for him due to his insulin needs changing dramatically from day to day (based on activity level).  He was not able to get an Endo f/u appt until March.    Thanks,  NILDA Bradshaw CDE

## 2018-12-26 NOTE — TELEPHONE ENCOUNTER
Endo staff:  Please see message below  Please pend metformin for 1000 mg twice daily    Please pend the orders with correct quantity, select pharmacy and then send for signature. Also associate with correct diagnosis.    Thank you.    Jodi Yung

## 2018-12-27 NOTE — TELEPHONE ENCOUNTER
Rx sent.    Start Metformin at a dose of 500 mg once a day X 1 week. If tolerated increase dose to 500 mg twice a day X 1 week. Then to 1000 mg AM and 500 mg PM X 1 week. Then 1000 mg twice day.  Major side effects with metformin includes gastrointestinal upset with nausea,abdominal cramps and diarrhea.    Please inform pt.

## 2018-12-28 ENCOUNTER — TELEPHONE (OUTPATIENT)
Dept: ENDOCRINOLOGY | Facility: CLINIC | Age: 48
End: 2018-12-28

## 2018-12-28 DIAGNOSIS — E10.9 TYPE 1 DIABETES, HBA1C GOAL < 7% (H): ICD-10-CM

## 2018-12-28 DIAGNOSIS — E10.9 TYPE 1 DIABETES MELLITUS WITHOUT COMPLICATION (H): ICD-10-CM

## 2018-12-28 NOTE — TELEPHONE ENCOUNTER
Called 3m humalog vials to [facility] now.  Pt takes 170u/d.  [facility] will fill 15 vials for 3m supply.  Did also call [facility] with the other 2 refills of humalog vials today.  Changed test strips to contour which pt says is compatible with the Medtronic pump.  Will reconcile med list.  Sammi Peters RN

## 2018-12-28 NOTE — TELEPHONE ENCOUNTER
"Reason for Call:  Other call back    Detailed comments: The pharmacy was calling and they would like to speak to the care team about how the pt is to use his Humalog. The directions as of right now say \"About 150 Units with insulin pump\". Please use the ref number in the contact section when you call.     Phone Number Patient can be reached at: Other phone number:  [phone]    Best Time: Anytime    Can we leave a detailed message on this number? YES    Call taken on 12/28/2018 at 9:11 AM by Sanaz Merlos      "

## 2018-12-28 NOTE — TELEPHONE ENCOUNTER
Patient called and was asking about his refill for Humalog. This needs to be filled asap and he wants to talk to a nurse for Dr. Gale. He needs to know how to take this medication.

## 2019-01-07 ENCOUNTER — TELEPHONE (OUTPATIENT)
Dept: INTERNAL MEDICINE | Facility: CLINIC | Age: 49
End: 2019-01-07

## 2019-01-07 DIAGNOSIS — E10.9 TYPE 1 DIABETES MELLITUS WITHOUT COMPLICATION (H): ICD-10-CM

## 2019-01-07 DIAGNOSIS — I10 ESSENTIAL HYPERTENSION, BENIGN: ICD-10-CM

## 2019-01-07 RX ORDER — LISINOPRIL 20 MG/1
20 TABLET ORAL 2 TIMES DAILY
Qty: 180 TABLET | Refills: 1 | Status: CANCELLED | OUTPATIENT
Start: 2019-01-07

## 2019-01-07 NOTE — TELEPHONE ENCOUNTER
Spoke with [facility] pharmacy.  They have two questions for PCP regarding patient's medications.  Please see below for further details.    1)  Would PCP like patient to change to Lisinopril 40mg tablet daily versus taking Lisinopril 20mg BID?    2)  Is it ok for patient to continue taking Simvastatin 40mg with Amlodipine due to the risks of myopathy and rhabdomylosis?   Please advise.

## 2019-01-07 NOTE — TELEPHONE ENCOUNTER
Pharmacy stated that they were just wondering if PCP would like patient to take one tablet a day versus two?  If PCP would rather have patient take divided doses (which writer suggested was probably the case considering that is how it was originally prescribed) they are fine with that.  Would PCP like to stay with 20mg BID?

## 2019-03-20 ENCOUNTER — OFFICE VISIT (OUTPATIENT)
Dept: ENDOCRINOLOGY | Facility: CLINIC | Age: 49
End: 2019-03-20
Payer: COMMERCIAL

## 2019-03-20 VITALS
TEMPERATURE: 97.3 F | SYSTOLIC BLOOD PRESSURE: 136 MMHG | HEART RATE: 69 BPM | WEIGHT: 230.5 LBS | RESPIRATION RATE: 12 BRPM | OXYGEN SATURATION: 98 % | BODY MASS INDEX: 32.15 KG/M2 | DIASTOLIC BLOOD PRESSURE: 73 MMHG

## 2019-03-20 DIAGNOSIS — E10.9 TYPE 1 DIABETES MELLITUS WITHOUT COMPLICATION (H): Primary | ICD-10-CM

## 2019-03-20 LAB — HBA1C MFR BLD: 7 % (ref 0–5.6)

## 2019-03-20 PROCEDURE — 83036 HEMOGLOBIN GLYCOSYLATED A1C: CPT | Performed by: CLINICAL NURSE SPECIALIST

## 2019-03-20 PROCEDURE — 99214 OFFICE O/P EST MOD 30 MIN: CPT | Performed by: CLINICAL NURSE SPECIALIST

## 2019-03-20 PROCEDURE — 36415 COLL VENOUS BLD VENIPUNCTURE: CPT | Performed by: CLINICAL NURSE SPECIALIST

## 2019-03-20 NOTE — PATIENT INSTRUCTIONS
Today's A1c is a little higher, which is not unexpected with a new pump start.  Component      Latest Ref Rng & Units 7/6/2017 3/6/2018 3/20/2019   Hemoglobin A1C      0 - 5.6 % 6.7 (H) 6.6 (H) 7.0 (H)       Try the Quickset or Tsure infusions to see if they work better.  If you find an infusion set you like better, I would start by contacting medtronic to let them know.  If I need to sign any kind of new order for the infusion sets, just have Med[employer] fax the order to me here at the [facility].    Follow up in 3 mo - Thursday, June 20, at 9:30 am    Lisette Dyer NP  Endocrinology

## 2019-03-20 NOTE — LETTER
3/20/2019         RE: Aldo Sharpe  [address] [place] [id number]        Dear Colleague,    Thank you for referring your patient, Aldo Sharpe, to the [facility]. Please see a copy of my visit note below.    Endocrinology Clinic Note:  Name: Aldo Sharpe  F/u for Diabetes (Last seen 3/6/2018).  HPI:  Aldo Sharpe is a 48 year old male who presents for the management of type 1 diabetes.  Previously saw Dr. Yung.  Likes Dr. Yung - says it's hard to get an appointment because her schedule is so full.     1. Type 1 DM:  Orginally diagnosed at the age of: 15- years old  Hospitalization for DKA: none  Current Regimen: Medtronic 670 G.  Started pump therapy 1/2019.  Current Regimen:   Insulin pump - Auto mode 99%  Humalog + Metformin XR 1000 mg bid.  Using [employer] meter - strips provided free of charge by insurance.  Time Rate (U/hr)   0000 1.9                 Carbohydrate Ratio -    Time Ratio   0000 4         Sensitivity   20   Active Insulin Time  3 hours   Basal  35% (36 units)   Bolus   65% (68 units)   Total Carbohydrates/day 248 grams   Total Insulin/day  104 units   Average Blood Sugar  Average sensor glucose  BG range 148  140     BS Checks  times a day   Care Link Username-   Password-   Changes set q 1.7 days.  Using [place] - one of every 3 infusion sets doesn't deliver.  Has sample sure-T at home, hasn't tried yet.    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  Fixed meal pattern: yes  Patient counting carbs: sometimes    DM Complications:   Complications:   Diabetes Complications  Description / Detail    Diabetic Retinopathy  No - yearly   CAD / PAD  No   Neuropathy  No   Nephropathy / Microalbuminuria  No   Gastroparesis  No   Hypoglycemia [place]  No     2. Hypertension: Blood Pressure today:   BP Readings from Last 3 Encounters:   03/20/19 136/73   07/10/18  138/78   03/06/18 122/70   On medications - Amlodipine 5 mg daily, Lisinopril 20 mg bid .  3. Hyperlipidemia:  On medication - Simvastatin 40 mg daily.  PMH/PSH:  Past Medical History:   Diagnosis Date     Diabetic eye exam (H) 03/18/14     Hyperlipidemia LDL goal <100 10/31/2010     Type 1 diabetes, HbA1c goal < 7% (H) 10/31/2010     History reviewed. No pertinent surgical history.  Family Hx:  History reviewed. No pertinent family history.        DM2:           Social Hx:  Social History     Socioeconomic History     Marital status:      Spouse name: Not on file     Number of children: Not on file     Years of education: Not on file     Highest education level: Not on file   Occupational History     Not on file   Social Needs     Financial resource strain: Not on file     Food insecurity:     Worry: Not on file     Inability: Not on file     Transportation needs:     Medical: Not on file     Non-medical: Not on file   Tobacco Use     Smoking status: Never Smoker     Smokeless tobacco: Never Used   Substance and Sexual Activity     Alcohol use: No     Comment: none     Drug use: No     Sexual activity: Yes     Partners: Female   Lifestyle     Physical activity:     Days per week: Not on file     Minutes per session: Not on file     Stress: Not on file   Relationships     Social connections:     Talks on phone: Not on file     Gets together: Not on file     Attends Caodaism service: Not on file     Active member of club or organization: Not on file     Attends meetings of clubs or organizations: Not on file     Relationship status: Not on file     Intimate partner violence:     Fear of current or ex partner: Not on file     Emotionally abused: Not on file     Physically abused: Not on file     Forced sexual activity: Not on file   Other Topics Concern     Parent/sibling w/ CABG, MI or angioplasty before 65F 55M? Not Asked   Social History Narrative     Not on file          MEDICATIONS:  has a current  medication list which includes the following prescription(s): acetone urine, amlodipine, aspirin, b-d u/f, blood glucose, blood glucose monitoring, fish oil, glucagon, insulin lispro, lisinopril, metformin, multivitamin w/minerals, omega 3, and simvastatin.    ROS     ROS: 10 point ROS neg other than the symptoms noted above in the HPI.    Physical Exam   VS: /73 (BP Location: Right arm, Patient Position: Chair, Cuff Size: Adult Large)   Pulse 69   Temp 97.3  F (36.3  C) (Oral)   Resp 12   Wt 104.6 kg (230 lb 8 oz)   SpO2 98%   BMI 32.15 kg/m     GENERAL: AXOX3, NAD, well dressed, answering questions appropriately, appears stated age.  HEENT: No exopthalmous, no proptosis, EOMI, no lig lag, no retraction  NECK: Thyroid normal in size, non tender, no nodules were palpated.  CV: RRR, no rubs, gallops, no murmurs  LUNGS: CTAB, no wheezes, rales, or ronchi  EXTREMITIES: no edema, +pulses, no rashes, no lesions  NEUROLOGY: CN grossly intact, no tremors  DM FOOT EXAM: feet with 2+ pulses, 10-gram plantar sensation 6/6 bilaterally, no open lesions.  MSK: grossly intact   PSYCH: normal affect and mood    LABS:  A1c:  Component      Latest Ref Rng & Units 7/6/2017 3/6/2018 3/20/2019   Hemoglobin A1C      0 - 5.6 % 6.7 (H) 6.6 (H) 7.0 (H)     BMP:  !COMPREHENSIVE Latest Ref Rng & Units 7/10/2018   SODIUM 133 - 144 mmol/L 142   POTASSIUM 3.4 - 5.3 mmol/L 3.9   CHLORIDE 94 - 109 mmol/L 107   BUN 7 - 30 mg/dL 16   Creatinine 0.66 - 1.25 mg/dL 0.98   Glucose 70 - 99 mg/dL 97   ANION GAP 3 - 14 mmol/L 7   CALCIUM 8.5 - 10.1 mg/dL 8.9   ALBUMIN 3.4 - 5.0 g/dL 3.8   PROTEIN, TOTAL 6.8 - 8.8 g/dL 7.6     Urine Micro:  Component      Latest Ref Rng & Units 7/10/2018   Creatinine Urine      mg/dL 154   Albumin Urine mg/L      mg/L 9   Albumin Urine mg/g Cr      0 - 17 mg/g Cr 6.10     LFTs/Lipids:  !LIPID/HEPATIC Latest Ref Rng & Units 7/10/2018   CHOLESTEROL <200 mg/dL 169   TRIGLYCERIDES <150 mg/dL 87   HDL CHOLESTEROL  >39 mg/dL 47   LDL CHOLESTEROL, CALCULATED <100 mg/dL 105 (H)   VLDL-CHOLESTEROL 0 - 30 mg/dL    NON HDL CHOLESTEROL <130 mg/dL 122   CHOLESTEROL/HDL RATIO 0.0 - 5.0    AST 0 - 45 U/L 22   ALT 0 - 70 U/L 44     TFTs:  !THYROID Latest Ref Rng & Units 3/6/2018 7/6/2017   TSH 0.40 - 4.00 mU/L 2.28 4.04 (H)   T4 FREE 0.76 - 1.46 ng/dL  0.95     Blood Glucose and pump data/ Meter reviewed.     All pertinent notes, labs, and images personally reviewed by me.     A/P  Mr.David INGRID Sharpe is a 48 year old here for the evaluation/management of diabetes:    1. DM1 - Under Good control. No known complications form DM.  Historically blood glucose has been under good control.  A1c up slightly with recent transition to an insulin pump.  I expect this to improve.  No pump setting changes at this time.  Continue Metformin 1000 mg bid.  Sample quickset infusion sets given to try.  Encouraged him to also try the sure-T.  He'll let Med[employer] know if he prefers one of these other insulin sets.  Rx for glucagon emergency kit and urine ketone strips provided today.    He has Lantus at home for use if pump fails.      Recommend checking blood sugars before meals and at bedtime.    If Blood glucose are low more often-> 2-3 times/week- give us a call.  The patient is advised to Make better food choices: reduce carbs, Reduce portion size, weight loss and exercise 3-4 times a week.  Discussed hypoglycemia signs and symptoms as well as management in detail.    2. Hypertension - Controlled    3. Hyperlipidemia - On statin therapy.  4. Prevention  Flu Shot-  Pneumovax-   Opthalmology- 2017  ASA- 81 mg  Smoking- no    Follow-up:  follow up in 3 month(s)    Lisette Dyer NP  Endocrinology  [facility]  CC: Kaleb Rosetnhal    More than 50% of face to face time spent with Mr. Sharpe on counseling / coordinating his care.     All questions were answered.  The patient indicates understanding of the above issues and agrees with the plan set  forth. 25 min.         Again, thank you for allowing me to participate in the care of your patient.        Sincerely,        ALEXIA Dyer CNP

## 2019-03-20 NOTE — PROGRESS NOTES
Endocrinology Clinic Note:  Name: Aldo Sharpe  F/u for Diabetes (Last seen 3/6/2018).  HPI:  Aldo Sharpe is a 48 year old male who presents for the management of type 1 diabetes.  Previously saw Dr. Yung.  Likes Dr. Yung - says it's hard to get an appointment because her schedule is so full.     1. Type 1 DM:  Orginally diagnosed at the age of: 15- years old  Hospitalization for DKA: none  Current Regimen: Medtronic 670 G.  Started pump therapy 1/2019.  Current Regimen:   Insulin pump - Auto mode 99%  Humalog + Metformin XR 1000 mg bid.  Using [employer] meter - strips provided free of charge by insurance.  Time Rate (U/hr)   0000 1.9                 Carbohydrate Ratio -    Time Ratio   0000 4         Sensitivity   20   Active Insulin Time  3 hours   Basal  35% (36 units)   Bolus   65% (68 units)   Total Carbohydrates/day 248 grams   Total Insulin/day  104 units   Average Blood Sugar  Average sensor glucose  BG range 148  140     BS Checks  times a day   Care Link Username-   Password-   Changes set q 1.7 days.  Using Pardeep - one of every 3 infusion sets doesn't deliver.  Has sample sure-T at home, hasn't tried yet.    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  Fixed meal pattern: yes  Patient counting carbs: sometimes    DM Complications:   Complications:   Diabetes Complications  Description / Detail    Diabetic Retinopathy  No - yearly   CAD / PAD  No   Neuropathy  No   Nephropathy / Microalbuminuria  No   Gastroparesis  No   Hypoglycemia [place]ar[place]  No     2. Hypertension: Blood Pressure today:   BP Readings from Last 3 Encounters:   03/20/19 136/73   07/10/18 138/78   03/06/18 122/70   On medications - Amlodipine 5 mg daily, Lisinopril 20 mg bid .  3. Hyperlipidemia:  On medication - Simvastatin 40 mg daily.  PMH/PSH:  Past Medical History:   Diagnosis Date     Diabetic eye exam (H) 03/18/14      Hyperlipidemia LDL goal <100 10/31/2010     Type 1 diabetes, HbA1c goal < 7% (H) 10/31/2010     History reviewed. No pertinent surgical history.  Family Hx:  History reviewed. No pertinent family history.        DM2:           Social Hx:  Social History     Socioeconomic History     Marital status:      Spouse name: Not on file     Number of children: Not on file     Years of education: Not on file     Highest education level: Not on file   Occupational History     Not on file   Social Needs     Financial resource strain: Not on file     Food insecurity:     Worry: Not on file     Inability: Not on file     Transportation needs:     Medical: Not on file     Non-medical: Not on file   Tobacco Use     Smoking status: Never Smoker     Smokeless tobacco: Never Used   Substance and Sexual Activity     Alcohol use: No     Comment: none     Drug use: No     Sexual activity: Yes     Partners: Female   Lifestyle     Physical activity:     Days per week: Not on file     Minutes per session: Not on file     Stress: Not on file   Relationships     Social connections:     Talks on phone: Not on file     Gets together: Not on file     Attends Hindu service: Not on file     Active member of club or organization: Not on file     Attends meetings of clubs or organizations: Not on file     Relationship status: Not on file     Intimate partner violence:     Fear of current or ex partner: Not on file     Emotionally abused: Not on file     Physically abused: Not on file     Forced sexual activity: Not on file   Other Topics Concern     Parent/sibling w/ CABG, MI or angioplasty before 65F 55M? Not Asked   Social History Narrative     Not on file          MEDICATIONS:  has a current medication list which includes the following prescription(s): acetone urine, amlodipine, aspirin, b-d u/f, blood glucose, blood glucose monitoring, fish oil, glucagon, insulin lispro, lisinopril, metformin, multivitamin w/minerals, omega 3, and  simvastatin.    ROS     ROS: 10 point ROS neg other than the symptoms noted above in the HPI.    Physical Exam   VS: /73 (BP Location: Right arm, Patient Position: Chair, Cuff Size: Adult Large)   Pulse 69   Temp 97.3  F (36.3  C) (Oral)   Resp 12   Wt 104.6 kg (230 lb 8 oz)   SpO2 98%   BMI 32.15 kg/m    GENERAL: AXOX3, NAD, well dressed, answering questions appropriately, appears stated age.  HEENT: No exopthalmous, no proptosis, EOMI, no lig lag, no retraction  NECK: Thyroid normal in size, non tender, no nodules were palpated.  CV: RRR, no rubs, gallops, no murmurs  LUNGS: CTAB, no wheezes, rales, or ronchi  EXTREMITIES: no edema, +pulses, no rashes, no lesions  NEUROLOGY: CN grossly intact, no tremors  DM FOOT EXAM: feet with 2+ pulses, 10-gram plantar sensation 6/6 bilaterally, no open lesions.  MSK: grossly intact   PSYCH: normal affect and mood    LABS:  A1c:  Component      Latest Ref Rng & Units 7/6/2017 3/6/2018 3/20/2019   Hemoglobin A1C      0 - 5.6 % 6.7 (H) 6.6 (H) 7.0 (H)     BMP:  !COMPREHENSIVE Latest Ref Rng & Units 7/10/2018   SODIUM 133 - 144 mmol/L 142   POTASSIUM 3.4 - 5.3 mmol/L 3.9   CHLORIDE 94 - 109 mmol/L 107   BUN 7 - 30 mg/dL 16   Creatinine 0.66 - 1.25 mg/dL 0.98   Glucose 70 - 99 mg/dL 97   ANION GAP 3 - 14 mmol/L 7   CALCIUM 8.5 - 10.1 mg/dL 8.9   ALBUMIN 3.4 - 5.0 g/dL 3.8   PROTEIN, TOTAL 6.8 - 8.8 g/dL 7.6     Urine Micro:  Component      Latest Ref Rng & Units 7/10/2018   Creatinine Urine      mg/dL 154   Albumin Urine mg/L      mg/L 9   Albumin Urine mg/g Cr      0 - 17 mg/g Cr 6.10     LFTs/Lipids:  !LIPID/HEPATIC Latest Ref Rng & Units 7/10/2018   CHOLESTEROL <200 mg/dL 169   TRIGLYCERIDES <150 mg/dL 87   HDL CHOLESTEROL >39 mg/dL 47   LDL CHOLESTEROL, CALCULATED <100 mg/dL 105 (H)   VLDL-CHOLESTEROL 0 - 30 mg/dL    NON HDL CHOLESTEROL <130 mg/dL 122   CHOLESTEROL/HDL RATIO 0.0 - 5.0    AST 0 - 45 U/L 22   ALT 0 - 70 U/L 44     TFTs:  !THYROID Latest Ref Rng &  Units 3/6/2018 7/6/2017   TSH 0.40 - 4.00 mU/L 2.28 4.04 (H)   T4 FREE 0.76 - 1.46 ng/dL  0.95     Blood Glucose and pump data/ Meter reviewed.     All pertinent notes, labs, and images personally reviewed by me.     A/P  Mr.David INGRID Sharpe is a 48 year old here for the evaluation/management of diabetes:    1. DM1 - Under Good control. No known complications form DM.  Historically blood glucose has been under good control.  A1c up slightly with recent transition to an insulin pump.  I expect this to improve.  No pump setting changes at this time.  Continue Metformin 1000 mg bid.  Sample quickset infusion sets given to try.  Encouraged him to also try the sure-T.  He'll let Medtronic know if he prefers one of these other insulin sets.  Rx for glucagon emergency kit and urine ketone strips provided today.    He has Lantus at home for use if pump fails.      Recommend checking blood sugars before meals and at bedtime.    If Blood glucose are low more often-> 2-3 times/week- give us a call.  The patient is advised to Make better food choices: reduce carbs, Reduce portion size, weight loss and exercise 3-4 times a week.  Discussed hypoglycemia signs and symptoms as well as management in detail.    2. Hypertension - Controlled    3. Hyperlipidemia - On statin therapy.  4. Prevention  Flu Shot-  Pneumovax-   Opthalmology- 2017  ASA- 81 mg  Smoking- no    Follow-up:  follow up in 3 month(s)    Lisette Dyer NP  Endocrinology  [facility]  CC: Kaleb Rosenthal    More than 50% of face to face time spent with Mr. Sharpe on counseling / coordinating his care.     All questions were answered.  The patient indicates understanding of the above issues and agrees with the plan set forth. 25 min.

## 2019-03-28 ENCOUNTER — TELEPHONE (OUTPATIENT)
Dept: ENDOCRINOLOGY | Facility: CLINIC | Age: 49
End: 2019-03-28

## 2019-03-28 NOTE — TELEPHONE ENCOUNTER
Called John Paul  Verified vials needed for about 170 U per insulin pump    Cristina Terrazas RN, BS  Clinical Nurse Triage.

## 2019-03-28 NOTE — TELEPHONE ENCOUNTER
Yes, he started the insulin pump in January.  Vials are correct.  Lisette Dyer NP  Endocrinology

## 2019-03-28 NOTE — TELEPHONE ENCOUNTER
Lissett, [phone], wants to confirm humalog vials and DOSE that were sent, pt has never received vials, hx of humalog kwikpen, does pt have pump now? Routed to , please confirm, route for call back to Lissett, use reference number      Ref: [id number]    lispro (HUMALOG VIAL) 100 UNIT/ML vial 16 vial 3 3/20/2019  No   Sig: About 170 Units with insulin pump.     Rosalina Wright, RN, BSN  Message handled by Nurse Triage.

## 2019-04-23 DIAGNOSIS — I10 ESSENTIAL HYPERTENSION, BENIGN: ICD-10-CM

## 2019-04-23 DIAGNOSIS — E10.9 TYPE 1 DIABETES MELLITUS WITHOUT COMPLICATION (H): ICD-10-CM

## 2019-04-24 RX ORDER — LISINOPRIL 20 MG/1
TABLET ORAL
Qty: 180 TABLET | Refills: 1 | Status: SHIPPED | OUTPATIENT
Start: 2019-04-24 | End: 2019-10-02

## 2019-04-24 NOTE — TELEPHONE ENCOUNTER
Patient was requested to return in Jan but has seen Endocrinology . Please approve medication . .Anna Delaney RN

## 2019-05-27 ENCOUNTER — MYC REFILL (OUTPATIENT)
Dept: INTERNAL MEDICINE | Facility: CLINIC | Age: 49
End: 2019-05-27

## 2019-05-27 DIAGNOSIS — E78.5 HYPERLIPIDEMIA LDL GOAL <100: ICD-10-CM

## 2019-05-28 RX ORDER — SIMVASTATIN 40 MG
40 TABLET ORAL AT BEDTIME
Qty: 90 TABLET | Refills: 0 | Status: SHIPPED | OUTPATIENT
Start: 2019-05-28 | End: 2019-08-25

## 2019-06-20 ENCOUNTER — OFFICE VISIT (OUTPATIENT)
Dept: ENDOCRINOLOGY | Facility: CLINIC | Age: 49
End: 2019-06-20
Payer: COMMERCIAL

## 2019-06-20 VITALS
TEMPERATURE: 98 F | RESPIRATION RATE: 12 BRPM | SYSTOLIC BLOOD PRESSURE: 134 MMHG | BODY MASS INDEX: 32.11 KG/M2 | HEART RATE: 80 BPM | WEIGHT: 230.2 LBS | DIASTOLIC BLOOD PRESSURE: 80 MMHG

## 2019-06-20 DIAGNOSIS — E10.9 TYPE 1 DIABETES MELLITUS WITHOUT COMPLICATION (H): Primary | ICD-10-CM

## 2019-06-20 DIAGNOSIS — Z96.41 INSULIN PUMP IN PLACE: ICD-10-CM

## 2019-06-20 DIAGNOSIS — I10 ESSENTIAL HYPERTENSION, BENIGN: ICD-10-CM

## 2019-06-20 DIAGNOSIS — E78.5 HYPERLIPIDEMIA LDL GOAL <100: ICD-10-CM

## 2019-06-20 LAB
ALBUMIN SERPL-MCNC: 3.6 G/DL (ref 3.4–5)
ALP SERPL-CCNC: 62 U/L (ref 40–150)
ALT SERPL W P-5'-P-CCNC: 27 U/L (ref 0–70)
ANION GAP SERPL CALCULATED.3IONS-SCNC: 9 MMOL/L (ref 3–14)
AST SERPL W P-5'-P-CCNC: 15 U/L (ref 0–45)
BILIRUB SERPL-MCNC: 0.5 MG/DL (ref 0.2–1.3)
BUN SERPL-MCNC: 17 MG/DL (ref 7–30)
CALCIUM SERPL-MCNC: 8.6 MG/DL (ref 8.5–10.1)
CHLORIDE SERPL-SCNC: 107 MMOL/L (ref 94–109)
CHOLEST SERPL-MCNC: 124 MG/DL
CO2 SERPL-SCNC: 25 MMOL/L (ref 20–32)
CREAT SERPL-MCNC: 0.87 MG/DL (ref 0.66–1.25)
GFR SERPL CREATININE-BSD FRML MDRD: >90 ML/MIN/{1.73_M2}
GLUCOSE SERPL-MCNC: 134 MG/DL (ref 70–99)
HBA1C MFR BLD: 6.8 % (ref 0–5.6)
HDLC SERPL-MCNC: 39 MG/DL
LDLC SERPL CALC-MCNC: 70 MG/DL
NONHDLC SERPL-MCNC: 85 MG/DL
POTASSIUM SERPL-SCNC: 4.5 MMOL/L (ref 3.4–5.3)
PROT SERPL-MCNC: 7 G/DL (ref 6.8–8.8)
SODIUM SERPL-SCNC: 141 MMOL/L (ref 133–144)
TRIGL SERPL-MCNC: 74 MG/DL
TSH SERPL DL<=0.005 MIU/L-ACNC: 3.21 MU/L (ref 0.4–4)

## 2019-06-20 PROCEDURE — 80053 COMPREHEN METABOLIC PANEL: CPT | Performed by: CLINICAL NURSE SPECIALIST

## 2019-06-20 PROCEDURE — 99214 OFFICE O/P EST MOD 30 MIN: CPT | Performed by: CLINICAL NURSE SPECIALIST

## 2019-06-20 PROCEDURE — 36415 COLL VENOUS BLD VENIPUNCTURE: CPT | Performed by: CLINICAL NURSE SPECIALIST

## 2019-06-20 PROCEDURE — 80061 LIPID PANEL: CPT | Performed by: CLINICAL NURSE SPECIALIST

## 2019-06-20 PROCEDURE — 83036 HEMOGLOBIN GLYCOSYLATED A1C: CPT | Performed by: CLINICAL NURSE SPECIALIST

## 2019-06-20 PROCEDURE — 99207 C FOOT EXAM  NO CHARGE: CPT | Performed by: CLINICAL NURSE SPECIALIST

## 2019-06-20 PROCEDURE — 84443 ASSAY THYROID STIM HORMONE: CPT | Performed by: CLINICAL NURSE SPECIALIST

## 2019-06-20 NOTE — PATIENT INSTRUCTIONS
Component      Latest Ref Rng & Units 3/20/2019 6/20/2019   Hemoglobin A1C      0 - 5.6 % 7.0 (H) 6.8 (H)       In an effort to stay on schedule, please remember to check in for your next clinic appointment 15-20 minutes early.  This is necessary so your insulin pump/blood glucose meter or CGM can be uploaded and any labs can be done if necessary.  We appreciate your understanding.

## 2019-06-20 NOTE — PROGRESS NOTES
Endocrinology Clinic Note:  Name: Aldo Sharpe  F/u for Diabetes (Last seen 3/20/2019).  HPI:  Aldo Sharpe is a 49 year old male who presents for the management of type 1 diabetes.  Previously saw Dr. Yung.  Likes Dr. Yung - says it's hard to get an appointment because her schedule is so full.     1. Type 1 DM:  Orginally diagnosed at the age of: 15- years old  Hospitalization for DKA: none  Current Regimen: Medtronic 670 G.  Started pump therapy 1/2019.  Current Regimen:   Insulin pump - Auto mode 84%  Humalog + Metformin XR 1000 mg bid.  Using [employer] meter - strips provided free of charge by insurance.  Time Rate (U/hr)   0000 1.90                 Carbohydrate Ratio -    Time Ratio   0000 4         Sensitivity   20   Active Insulin Time  3:00 hours   Basal  37% (39 units)   Bolus   63% (66 units)   Total Carbohydrates/day 232 grams   Total Insulin/day  105 units   Average Blood Sugar  Average sensor glucose  BG range 149  139  . 83% in target range   BS Checks  8.6/2.7 times a day   Care Link Username-   Password-   Changes set q 2.6 days.  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  Fixed meal pattern: yes  Patient counting carbs: sometimes    DM Complications:   Complications:   Diabetes Complications  Description / Detail    Diabetic Retinopathy  No retinopathy - yearly   CAD / PAD  No   Neuropathy  No   Nephropathy / Microalbuminuria  No   Gastroparesis  No   Hypoglycemia [place]  No     2. Hypertension: Blood Pressure today:   BP Readings from Last 3 Encounters:   06/20/19 134/80   03/20/19 136/73   07/10/18 138/78   On medications - Amlodipine 5 mg daily, Lisinopril 20 mg bid .  3. Hyperlipidemia:  On medication - Simvastatin 40 mg daily.  PMH/PSH:  Past Medical History:   Diagnosis Date     Diabetic eye exam (H) 03/18/14     Hyperlipidemia LDL goal <100 10/31/2010     Type 1  diabetes, HbA1c goal < 7% (H) 10/31/2010     History reviewed. No pertinent surgical history.  Family Hx:  History reviewed. No pertinent family history.        DM2:           Social Hx:  Social History     Socioeconomic History     Marital status:      Spouse name: Not on file     Number of children: Not on file     Years of education: Not on file     Highest education level: Not on file   Occupational History     Not on file   Social Needs     Financial resource strain: Not on file     Food insecurity:     Worry: Not on file     Inability: Not on file     Transportation needs:     Medical: Not on file     Non-medical: Not on file   Tobacco Use     Smoking status: Never Smoker     Smokeless tobacco: Never Used   Substance and Sexual Activity     Alcohol use: No     Comment: none     Drug use: No     Sexual activity: Yes     Partners: Female   Lifestyle     Physical activity:     Days per week: Not on file     Minutes per session: Not on file     Stress: Not on file   Relationships     Social connections:     Talks on phone: Not on file     Gets together: Not on file     Attends Mosque service: Not on file     Active member of club or organization: Not on file     Attends meetings of clubs or organizations: Not on file     Relationship status: Not on file     Intimate partner violence:     Fear of current or ex partner: Not on file     Emotionally abused: Not on file     Physically abused: Not on file     Forced sexual activity: Not on file   Other Topics Concern     Parent/sibling w/ CABG, MI or angioplasty before 65F 55M? Not Asked   Social History Narrative     Not on file          MEDICATIONS:  has a current medication list which includes the following prescription(s): acetone urine, amlodipine, aspirin, b-d u/f, blood glucose, blood glucose monitoring, fish oil, glucagon, insulin lispro, lisinopril, metformin, multivitamin w/minerals, omega 3, and simvastatin.    ROS     ROS: 10 point ROS neg other  than the symptoms noted above in the HPI.    Physical Exam   VS: /80 (BP Location: Right arm, Patient Position: Chair, Cuff Size: Adult Large)   Pulse 80   Temp 98  F (36.7  C) (Oral)   Resp 12   Wt 104.4 kg (230 lb 3.2 oz)   BMI 32.11 kg/m    GENERAL: AXOX3, NAD, well dressed, answering questions appropriately, appears stated age.  HEENT: No exopthalmous, no proptosis, no lig lag, no retraction  CV: RRR, no rubs, gallops, no murmurs  LUNGS: CTAB, no wheezes, rales, or ronchi  EXTREMITIES: feet with 2+ pulses, 10-gram plantar sensation 6/6 bilaterally, no open lesions.  NEUROLOGY: CN grossly intact, no tremors  MSK: grossly intact   PSYCH: normal affect and mood    LABS:  A1c:  Component      Latest Ref Rng & Units 3/20/2019 6/20/2019   Hemoglobin A1C      0 - 5.6 % 7.0 (H) 6.8 (H)     BMP:  !COMPREHENSIVE Latest Ref Rng & Units 7/10/2018   SODIUM 133 - 144 mmol/L 142   POTASSIUM 3.4 - 5.3 mmol/L 3.9   CHLORIDE 94 - 109 mmol/L 107   BUN 7 - 30 mg/dL 16   Creatinine 0.66 - 1.25 mg/dL 0.98   Glucose 70 - 99 mg/dL 97   ANION GAP 3 - 14 mmol/L 7   CALCIUM 8.5 - 10.1 mg/dL 8.9   ALBUMIN 3.4 - 5.0 g/dL 3.8   PROTEIN, TOTAL 6.8 - 8.8 g/dL 7.6     Urine Micro:  Component      Latest Ref Rng & Units 7/10/2018   Creatinine Urine      mg/dL 154   Albumin Urine mg/L      mg/L 9   Albumin Urine mg/g Cr      0 - 17 mg/g Cr 6.10     LFTs/Lipids:  !LIPID/HEPATIC Latest Ref Rng & Units 7/10/2018   CHOLESTEROL <200 mg/dL 169   TRIGLYCERIDES <150 mg/dL 87   HDL CHOLESTEROL >39 mg/dL 47   LDL CHOLESTEROL, CALCULATED <100 mg/dL 105 (H)   VLDL-CHOLESTEROL 0 - 30 mg/dL    NON HDL CHOLESTEROL <130 mg/dL 122   CHOLESTEROL/HDL RATIO 0.0 - 5.0    AST 0 - 45 U/L 22   ALT 0 - 70 U/L 44     TFTs:  !THYROID Latest Ref Rng & Units 3/6/2018 7/6/2017   TSH 0.40 - 4.00 mU/L 2.28 4.04 (H)   T4 FREE 0.76 - 1.46 ng/dL  0.95     Blood Glucose and pump data/ Meter reviewed.     All pertinent notes, labs, and images personally reviewed by me.      A/P  Mr.David INGRID Sharpe is a 49 year old here for the management of diabetes:    1. DM1 - Controlled. No known complications form DM.  Historically blood glucose has been under good control.  No pump setting changes at this time.  Continue Metformin 1000 mg bid.  Rx for glucagon emergency kit and urine ketone strips provided 3/2019.    He has Lantus at home for use if pump fails.      Recommend checking blood sugars before meals and at bedtime.    If Blood glucose are low more often-> 2-3 times/week- give us a call.  The patient is advised to Make better food choices: reduce carbs, Reduce portion size, weight loss and exercise 3-4 times a week.  Discussed hypoglycemia signs and symptoms as well as management in detail.    2. Hypertension - Controlled    3. Hyperlipidemia - On statin therapy.  4. Prevention  Flu Shot- recommend annually  Pneumovax- Conj 13-V: 12/2015; 23 valent: 12/2012.  Opthalmology- annually (wife works as )  ASA- 81 mg  Smoking- no    Follow-up:  follow up in 3 month(s)    Lisette Dyer NP  Endocrinology  [facility]  CC: Kaleb Rosentahl    More than 50% of face to face time spent with Mr. Sharpe on counseling / coordinating his care.     All questions were answered.  The patient indicates understanding of the above issues and agrees with the plan set forth. 20 min.

## 2019-06-20 NOTE — LETTER
6/20/2019         RE: Aldo Sharpe  [address] [place] [id number]        Dear Colleague,    Thank you for referring your patient, Aldo Sharpe, to the [facility]. Please see a copy of my visit note below.    Endocrinology Clinic Note:  Name: Aldo Sharpe  F/u for Diabetes (Last seen 3/20/2019).  HPI:  Aldo Sharpe is a 49 year old male who presents for the management of type 1 diabetes.  Previously saw Dr. Yung.  Likes Dr. Yung - says it's hard to get an appointment because her schedule is so full.     1. Type 1 DM:  Orginally diagnosed at the age of: 15- years old  Hospitalization for DKA: none  Current Regimen: Medtronic 670 G.  Started pump therapy 1/2019.  Current Regimen:   Insulin pump - Auto mode 84%  Humalog + Metformin XR 1000 mg bid.  Using [employer] meter - strips provided free of charge by insurance.  Time Rate (U/hr)   0000 1.90                 Carbohydrate Ratio -    Time Ratio   0000 4         Sensitivity   20   Active Insulin Time  3:00 hours   Basal  37% (39 units)   Bolus   63% (66 units)   Total Carbohydrates/day 232 grams   Total Insulin/day  105 units   Average Blood Sugar  Average sensor glucose  BG range 149  139  . 83% in target range   BS Checks  8.6/2.7 times a day   Care Link Username-   Password-   Changes set q 2.6 days.  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  Fixed meal pattern: yes  Patient counting carbs: sometimes    DM Complications:   Complications:   Diabetes Complications  Description / Detail    Diabetic Retinopathy  No retinopathy - yearly   CAD / PAD  No   Neuropathy  No   Nephropathy / Microalbuminuria  No   Gastroparesis  No   Hypoglycemia [place]  No     2. Hypertension: Blood Pressure today:   BP Readings from Last 3 Encounters:   06/20/19 134/80   03/20/19 136/73   07/10/18 138/78   On medications -  Amlodipine 5 mg daily, Lisinopril 20 mg bid .  3. Hyperlipidemia:  On medication - Simvastatin 40 mg daily.  PMH/PSH:  Past Medical History:   Diagnosis Date     Diabetic eye exam (H) 03/18/14     Hyperlipidemia LDL goal <100 10/31/2010     Type 1 diabetes, HbA1c goal < 7% (H) 10/31/2010     History reviewed. No pertinent surgical history.  Family Hx:  History reviewed. No pertinent family history.        DM2:           Social Hx:  Social History     Socioeconomic History     Marital status:      Spouse name: Not on file     Number of children: Not on file     Years of education: Not on file     Highest education level: Not on file   Occupational History     Not on file   Social Needs     Financial resource strain: Not on file     Food insecurity:     Worry: Not on file     Inability: Not on file     Transportation needs:     Medical: Not on file     Non-medical: Not on file   Tobacco Use     Smoking status: Never Smoker     Smokeless tobacco: Never Used   Substance and Sexual Activity     Alcohol use: No     Comment: none     Drug use: No     Sexual activity: Yes     Partners: Female   Lifestyle     Physical activity:     Days per week: Not on file     Minutes per session: Not on file     Stress: Not on file   Relationships     Social connections:     Talks on phone: Not on file     Gets together: Not on file     Attends Temple service: Not on file     Active member of club or organization: Not on file     Attends meetings of clubs or organizations: Not on file     Relationship status: Not on file     Intimate partner violence:     Fear of current or ex partner: Not on file     Emotionally abused: Not on file     Physically abused: Not on file     Forced sexual activity: Not on file   Other Topics Concern     Parent/sibling w/ CABG, MI or angioplasty before 65F 55M? Not Asked   Social History Narrative     Not on file          MEDICATIONS:  has a current medication list which includes the following  prescription(s): acetone urine, amlodipine, aspirin, b-d u/f, blood glucose, blood glucose monitoring, fish oil, glucagon, insulin lispro, lisinopril, metformin, multivitamin w/minerals, omega 3, and simvastatin.    ROS     ROS: 10 point ROS neg other than the symptoms noted above in the HPI.    Physical Exam   VS: /80 (BP Location: Right arm, Patient Position: Chair, Cuff Size: Adult Large)   Pulse 80   Temp 98  F (36.7  C) (Oral)   Resp 12   Wt 104.4 kg (230 lb 3.2 oz)   BMI 32.11 kg/m     GENERAL: AXOX3, NAD, well dressed, answering questions appropriately, appears stated age.  HEENT: No exopthalmous, no proptosis, no lig lag, no retraction  CV: RRR, no rubs, gallops, no murmurs  LUNGS: CTAB, no wheezes, rales, or ronchi  EXTREMITIES: feet with 2+ pulses, 10-gram plantar sensation 6/6 bilaterally, no open lesions.  NEUROLOGY: CN grossly intact, no tremors  MSK: grossly intact   PSYCH: normal affect and mood    LABS:  A1c:  Component      Latest Ref Rng & Units 3/20/2019 6/20/2019   Hemoglobin A1C      0 - 5.6 % 7.0 (H) 6.8 (H)     BMP:  !COMPREHENSIVE Latest Ref Rng & Units 7/10/2018   SODIUM 133 - 144 mmol/L 142   POTASSIUM 3.4 - 5.3 mmol/L 3.9   CHLORIDE 94 - 109 mmol/L 107   BUN 7 - 30 mg/dL 16   Creatinine 0.66 - 1.25 mg/dL 0.98   Glucose 70 - 99 mg/dL 97   ANION GAP 3 - 14 mmol/L 7   CALCIUM 8.5 - 10.1 mg/dL 8.9   ALBUMIN 3.4 - 5.0 g/dL 3.8   PROTEIN, TOTAL 6.8 - 8.8 g/dL 7.6     Urine Micro:  Component      Latest Ref Rng & Units 7/10/2018   Creatinine Urine      mg/dL 154   Albumin Urine mg/L      mg/L 9   Albumin Urine mg/g Cr      0 - 17 mg/g Cr 6.10     LFTs/Lipids:  !LIPID/HEPATIC Latest Ref Rng & Units 7/10/2018   CHOLESTEROL <200 mg/dL 169   TRIGLYCERIDES <150 mg/dL 87   HDL CHOLESTEROL >39 mg/dL 47   LDL CHOLESTEROL, CALCULATED <100 mg/dL 105 (H)   VLDL-CHOLESTEROL 0 - 30 mg/dL    NON HDL CHOLESTEROL <130 mg/dL 122   CHOLESTEROL/HDL RATIO 0.0 - 5.0    AST 0 - 45 U/L 22   ALT 0 - 70 U/L 44      TFTs:  !THYROID Latest Ref Rng & Units 3/6/2018 7/6/2017   TSH 0.40 - 4.00 mU/L 2.28 4.04 (H)   T4 FREE 0.76 - 1.46 ng/dL  0.95     Blood Glucose and pump data/ Meter reviewed.     All pertinent notes, labs, and images personally reviewed by me.     A/P  Mr.David INGRID Sharpe is a 49 year old here for the management of diabetes:    1. DM1 - Controlled. No known complications form DM.  Historically blood glucose has been under good control.  No pump setting changes at this time.  Continue Metformin 1000 mg bid.  Rx for glucagon emergency kit and urine ketone strips provided 3/2019.    He has Lantus at home for use if pump fails.      Recommend checking blood sugars before meals and at bedtime.    If Blood glucose are low more often-> 2-3 times/week- give us a call.  The patient is advised to Make better food choices: reduce carbs, Reduce portion size, weight loss and exercise 3-4 times a week.  Discussed hypoglycemia signs and symptoms as well as management in detail.    2. Hypertension - Controlled    3. Hyperlipidemia - On statin therapy.  4. Prevention  Flu Shot- recommend annually  Pneumovax- Conj 13-V: 12/2015; 23 valent: 12/2012.  Opthalmology- annually (wife works as )  ASA- 81 mg  Smoking- no    Follow-up:  follow up in 3 month(s)    Lisette Dyer NP  Endocrinology  [facility]  CC: Kaleb Rosenthal    More than 50% of face to face time spent with Mr. Sharpe on counseling / coordinating his care.     All questions were answered.  The patient indicates understanding of the above issues and agrees with the plan set forth. 20 min.         Again, thank you for allowing me to participate in the care of your patient.        Sincerely,        Lisette Dyer, ALEXIA CNP

## 2019-06-26 NOTE — RESULT ENCOUNTER NOTE
Aldo,  Your thyroid test was in normal range.  Your cholesterol numbers look good.  Your liver and kidney function are normal.  Here's a copy of the results for your records.  Please let me know if you have questions.  Lisette Dyer NP  Endocrinology

## 2019-07-08 ENCOUNTER — MYC REFILL (OUTPATIENT)
Dept: INTERNAL MEDICINE | Facility: CLINIC | Age: 49
End: 2019-07-08

## 2019-07-08 DIAGNOSIS — I10 ESSENTIAL HYPERTENSION, BENIGN: ICD-10-CM

## 2019-07-08 RX ORDER — AMLODIPINE BESYLATE 5 MG/1
5 TABLET ORAL DAILY
Qty: 90 TABLET | Refills: 0 | Status: SHIPPED | OUTPATIENT
Start: 2019-07-08 | End: 2019-10-02

## 2019-07-08 NOTE — TELEPHONE ENCOUNTER
"Prescription approved per [facility] Refill Protocol.    Requested Prescriptions   Pending Prescriptions Disp Refills     amLODIPine (NORVASC) 5 MG tablet 90 tablet 1     Sig: Take 1 tablet (5 mg) by mouth daily       Calcium Channel Blockers Protocol  Passed - 7/8/2019 10:17 AM        Passed - Blood pressure under 140/90 in past 12 months     BP Readings from Last 3 Encounters:   06/20/19 134/80   03/20/19 136/73   07/10/18 138/78                 Passed - Recent (12 mo) or future (30 days) visit within the authorizing provider's specialty     Patient had office visit in the last 12 months or has a visit in the next 30 days with authorizing provider or within the authorizing provider's specialty.  See \"Patient Info\" tab in inbasket, or \"Choose Columns\" in Meds & Orders section of the refill encounter.              Passed - Medication is active on med list        Passed - Patient is age 18 or older        Passed - Normal serum creatinine on file in past 12 months     Recent Labs   Lab Test 06/20/19  0925   CR 0.87               "

## 2019-07-10 DIAGNOSIS — E10.9 TYPE 1 DIABETES MELLITUS WITHOUT COMPLICATION (H): ICD-10-CM

## 2019-07-10 NOTE — TELEPHONE ENCOUNTER
Requested Prescriptions   Pending Prescriptions Disp Refills     metFORMIN (GLUCOPHAGE) 1000 MG tablet [Pharmacy Med Name: METFORMIN TAB 1000MG] 180 tablet 0     Sig: TAKE 1 TABLET TWICE A DAY  WITH MEALS   Last Written Prescription Date:  3/20/2019  Last Fill Quantity: 180,  # refills: 0   Last office visit: 7/10/2018 with prescribing provider:  Ariadna   Future Office Visit:   Next 5 appointments (look out 90 days)    Sep 20, 2019 10:00 AM CDT  (Arrive by 9:45 AM)  Return Visit with ALEXIA Dyer CNP  [facility]) [address]  [facility] [address]  [phone]             Biguanide Agents Passed - 7/10/2019 10:53 AM        Passed - Blood pressure less than 140/90 in past 6 months     BP Readings from Last 3 Encounters:   06/20/19 134/80   03/20/19 136/73   07/10/18 138/78             Passed - Patient has documented LDL within the past 12 mos.     Recent Labs   Lab Test 06/20/19  0925   LDL 70             Passed - Patient has had a Microalbumin in the past 15 mos.     Recent Labs   Lab Test 07/10/18  0859  02/01/12   MICROALB  --   --  <3   MICROL 9   < >  --    UMALCR 6.10   < >  --     < > = values in this interval not displayed.             Passed - Patient is age 10 or older        Passed - Patient has documented A1c within the specified period of time.     If HgbA1C is 8 or greater, it needs to be on file within the past 3 months.  If less than 8, must be on file within the past 6 months.     Recent Labs   Lab Test 06/20/19  0925   A1C 6.8*             Passed - Patient's CR is NOT>1.4 OR Patient's EGFR is NOT<45 within past 12 mos.     Recent Labs   Lab Test 06/20/19  0925   GFRESTIMATED >90   GFRESTBLACK >90       Recent Labs   Lab Test 06/20/19  0925   CR 0.87             Passed - Patient does NOT have a diagnosis of CHF.        Passed - Medication is active on med list        Passed - Recent (6 mo) or future (30 days) visit within the  "authorizing provider's specialty     Patient had office visit in the last 6 months or has a visit in the next 30 days with authorizing provider or within the authorizing provider's specialty.  See \"Patient Info\" tab in inbasket, or \"Choose Columns\" in Meds & Orders section of the refill encounter.          Prescription approved per [facility] Refill Protocol.  Altagracia Fuller RN      "

## 2019-08-25 DIAGNOSIS — E78.5 HYPERLIPIDEMIA LDL GOAL <100: ICD-10-CM

## 2019-08-27 RX ORDER — SIMVASTATIN 40 MG
40 TABLET ORAL AT BEDTIME
Qty: 90 TABLET | Refills: 0 | Status: SHIPPED | OUTPATIENT
Start: 2019-08-27 | End: 2019-10-09

## 2019-08-27 NOTE — TELEPHONE ENCOUNTER
Ok x one refill has pending appointment  Charlotte MANUELRN BSN  [facility]  [phone]

## 2019-08-27 NOTE — TELEPHONE ENCOUNTER
"Requested Prescriptions   Pending Prescriptions Disp Refills     simvastatin (ZOCOR) 40 MG tablet [Pharmacy Med Name: SIMVASTATIN 40 MG TABLET]  Last Written Prescription Date:  05/28/2019  Last Fill Quantity: 90,  # refills: 0   Last Office Visit: 7/10/2018   Future Office Visit:    Next 5 appointments (look out 90 days)    Sep 20, 2019 10:00 AM CDT  (Arrive by 9:45 AM)  Return Visit with ALEXIA Dyer CNP  [facility] ([facility]) [address] [facility] [address]  [phone]          90 tablet 0     Sig: TAKE 1 TABLET (40 MG) BY MOUTH AT BEDTIME       Statins Protocol Failed - 8/25/2019  8:39 AM        Failed - Recent (12 mo) or future (30 days) visit within the authorizing provider's specialty     Patient had office visit in the last 12 months or has a visit in the next 30 days with authorizing provider or within the authorizing provider's specialty.  See \"Patient Info\" tab in inbasket, or \"Choose Columns\" in Meds & Orders section of the refill encounter.              Passed - LDL on file in past 12 months     Recent Labs   Lab Test 06/20/19  0925   LDL 70             Passed - No abnormal creatine kinase in past 12 months     No lab results found.             Passed - Medication is active on med list        Passed - Patient is age 18 or older          "

## 2019-10-01 ENCOUNTER — E-VISIT (OUTPATIENT)
Dept: INTERNAL MEDICINE | Facility: CLINIC | Age: 49
End: 2019-10-01
Payer: COMMERCIAL

## 2019-10-01 ENCOUNTER — HEALTH MAINTENANCE LETTER (OUTPATIENT)
Age: 49
End: 2019-10-01

## 2019-10-01 ENCOUNTER — TELEPHONE (OUTPATIENT)
Dept: INTERNAL MEDICINE | Facility: CLINIC | Age: 49
End: 2019-10-01

## 2019-10-01 DIAGNOSIS — N52.9 ERECTILE DYSFUNCTION, UNSPECIFIED ERECTILE DYSFUNCTION TYPE: Primary | ICD-10-CM

## 2019-10-01 PROCEDURE — 99444 ZZC PHYSICIAN ONLINE EVALUATION & MANAGEMENT SERVICE: CPT | Performed by: INTERNAL MEDICINE

## 2019-10-01 RX ORDER — SILDENAFIL 100 MG/1
100 TABLET, FILM COATED ORAL DAILY PRN
Qty: 9 TABLET | Refills: 5 | Status: SHIPPED | OUTPATIENT
Start: 2019-10-01 | End: 2021-08-13

## 2019-10-01 NOTE — TELEPHONE ENCOUNTER
Reason for Call:  Other call back    Detailed comments: Pt is requesting a med change from Viagra to Sildenatil 100 mg, 18 tablets as a 90 day supply. This will be cost the pt much less per his insurance.  Please call pt to discuss.    Phone Number Patient can be reached at: Cell number on file:    Telephone Information:   Mobile [phone]       Best Time: anytime    Can we leave a detailed message on this number? YES    Call taken on 10/1/2019 at 12:37 PM by MASSIMO ANGELES

## 2019-10-02 DIAGNOSIS — E10.9 TYPE 1 DIABETES MELLITUS WITHOUT COMPLICATION (H): ICD-10-CM

## 2019-10-02 DIAGNOSIS — I10 ESSENTIAL HYPERTENSION, BENIGN: ICD-10-CM

## 2019-10-02 RX ORDER — LISINOPRIL 20 MG/1
TABLET ORAL
Qty: 180 TABLET | Refills: 1 | Status: SHIPPED | OUTPATIENT
Start: 2019-10-02 | End: 2019-10-09

## 2019-10-02 RX ORDER — AMLODIPINE BESYLATE 5 MG/1
TABLET ORAL
Qty: 90 TABLET | Refills: 0 | Status: SHIPPED | OUTPATIENT
Start: 2019-10-02 | End: 2020-01-09

## 2019-10-02 RX ORDER — SILDENAFIL 100 MG/1
100 TABLET, FILM COATED ORAL DAILY PRN
Qty: 18 TABLET | Refills: 0 | Status: SHIPPED | OUTPATIENT
Start: 2019-10-02 | End: 2020-07-30

## 2019-10-02 NOTE — TELEPHONE ENCOUNTER
Pt would like the RX sent to [facility] in [place] ([facility] However, for insurance to cover it it has to be written as dispense 18 tablets with a 3 month supply. If [facility] does not except the 3 month supply then it can be sent to the mail order [facility]. Shivani Olson on 10/2/2019 at 12:11 PM

## 2019-10-02 NOTE — TELEPHONE ENCOUNTER
"  Routing refill request to provider for review/approval because:  Patient needs to be seen because:  Because last seen 7/2018   Last Written Prescription Date:  4-24-19  Last Fill Quantity:180 # refills: 1  Last office visit: 7/10/2018 with prescribing provider: Kaleb Rosenthal  Future Office Visit: none with PCP  Next 5 appointments (look out 90 days)    Oct 09, 2019 12:30 PM CDT  Return Visit with ALEXIA Dyer CNP  [facility] [address] [facility] [address] [phone]         Requested Prescriptions   Pending Prescriptions Disp Refills     lisinopril (PRINIVIL/ZESTRIL) 20 MG tablet [Pharmacy Med Name: LISINOPRIL TAB 20MG] 180 tablet 1     Sig: TAKE 1 TABLET TWICE A DAY       ACE Inhibitors (Including Combos) Protocol Failed - 10/2/2019 11:28 AM        Failed - Recent (12 mo) or future (30 days) visit within the authorizing provider's specialty     Patient has had an office visit with the authorizing provider or a provider within the authorizing providers department within the previous 12 mos or has a future within next 30 days. See \"Patient Info\" tab in inbasket, or \"Choose Columns\" in Meds & Orders section of the refill encounter.              Passed - Blood pressure under 140/90 in past 12 months     BP Readings from Last 3 Encounters:   06/20/19 134/80   03/20/19 136/73   07/10/18 138/78                 Passed - Medication is active on med list        Passed - Patient is age 18 or older        Passed - Normal serum creatinine on file in past 12 months     Recent Labs   Lab Test 06/20/19  0925   CR 0.87             Passed - Normal serum potassium on file in past 12 months     Recent Labs   Lab Test 06/20/19  0925   POTASSIUM 4.5                              Last Written Prescription Date: 7-8-19  Last Fill Quantity: 90,  # refills: 0   Last office visit: 7/10/2018 with prescribing provider:  Kaleb Rosenthal    Future Office Visit: " "none with PCP  Next 5 appointments (look out 90 days)    Oct 09, 2019 12:30 PM CDT  Return Visit with ALEXIA Dyer CNP  [facility] ([facility]) [address]  [facility] [address] [phone]             amLODIPine (NORVASC) 5 MG tablet [Pharmacy Med Name: AMLODIPINE TAB 5MG] 90 tablet 0     Sig: TAKE 1 TABLET DAILY DUE FORANNUAL PHYSICAL EXAM WITH  DR. IZAGUIRRE FORREFILLS       Calcium Channel Blockers Protocol  Failed - 10/2/2019 11:28 AM        Failed - Recent (12 mo) or future (30 days) visit within the authorizing provider's specialty     Patient has had an office visit with the authorizing provider or a provider within the authorizing providers department within the previous 12 mos or has a future within next 30 days. See \"Patient Info\" tab in inbasket, or \"Choose Columns\" in Meds & Orders section of the refill encounter.              Passed - Blood pressure under 140/90 in past 12 months     BP Readings from Last 3 Encounters:   06/20/19 134/80   03/20/19 136/73   07/10/18 138/78                 Passed - Medication is active on med list        Passed - Patient is age 18 or older        Passed - Normal serum creatinine on file in past 12 months     Recent Labs   Lab Test 06/20/19  0925   CR 0.87             "

## 2019-10-02 NOTE — TELEPHONE ENCOUNTER
"Requested Prescriptions   Pending Prescriptions Disp Refills     metFORMIN (GLUCOPHAGE) 1000 MG tablet [Pharmacy Med Name: METFORMIN TAB 1000MG] 180 tablet 0     Sig: TAKE 1 TABLET TWICE A DAY  WITH MEALS       Biguanide Agents Passed - 10/2/2019  8:43 AM        Passed - Blood pressure less than 140/90 in past 6 months     BP Readings from Last 3 Encounters:   06/20/19 134/80   03/20/19 136/73   07/10/18 138/78                 Passed - Patient has documented LDL within the past 12 mos.     Recent Labs   Lab Test 06/20/19  0925   LDL 70             Passed - Patient has had a Microalbumin in the past 15 mos.     Recent Labs   Lab Test 07/10/18  0859  02/01/12   MICROALB  --   --  <3   MICROL 9   < >  --    UMALCR 6.10   < >  --     < > = values in this interval not displayed.             Passed - Patient is age 10 or older        Passed - Patient has documented A1c within the specified period of time.     If HgbA1C is 8 or greater, it needs to be on file within the past 3 months.  If less than 8, must be on file within the past 6 months.     Recent Labs   Lab Test 06/20/19  0925   A1C 6.8*             Passed - Patient's CR is NOT>1.4 OR Patient's EGFR is NOT<45 within past 12 mos.     Recent Labs   Lab Test 06/20/19  0925   GFRESTIMATED >90   GFRESTBLACK >90       Recent Labs   Lab Test 06/20/19  0925   CR 0.87             Passed - Patient does NOT have a diagnosis of CHF.        Passed - Medication is active on med list        Passed - Recent (6 mo) or future (30 days) visit within the authorizing provider's specialty     Patient had office visit in the last 6 months or has a visit in the next 30 days with authorizing provider or within the authorizing provider's specialty.  See \"Patient Info\" tab in inbasket, or \"Choose Columns\" in Meds & Orders section of the refill encounter.                Last Written Prescription Date:  07/10/2019  Last Fill Quantity: 180,  # refills: 0   Last office visit: 6/20/2019 with " prescribing provider:  Vamsi Hicks Office Visit:   Next 5 appointments (look out 90 days)    Oct 09, 2019 12:30 PM CDT  Return Visit with ALEXIA Dyer CNP  [facility] ([facility]) [address]  [facility] [address]  [phone]

## 2019-10-09 ENCOUNTER — OFFICE VISIT (OUTPATIENT)
Dept: ENDOCRINOLOGY | Facility: CLINIC | Age: 49
End: 2019-10-09
Payer: COMMERCIAL

## 2019-10-09 VITALS
BODY MASS INDEX: 32.08 KG/M2 | TEMPERATURE: 98 F | DIASTOLIC BLOOD PRESSURE: 77 MMHG | SYSTOLIC BLOOD PRESSURE: 125 MMHG | WEIGHT: 230 LBS | RESPIRATION RATE: 12 BRPM | HEART RATE: 98 BPM

## 2019-10-09 DIAGNOSIS — E78.5 HYPERLIPIDEMIA LDL GOAL <100: ICD-10-CM

## 2019-10-09 DIAGNOSIS — E10.9 TYPE 1 DIABETES MELLITUS WITHOUT COMPLICATION (H): Primary | ICD-10-CM

## 2019-10-09 DIAGNOSIS — I10 ESSENTIAL HYPERTENSION, BENIGN: ICD-10-CM

## 2019-10-09 LAB — HBA1C MFR BLD: 6.9 % (ref 0–5.6)

## 2019-10-09 PROCEDURE — 99214 OFFICE O/P EST MOD 30 MIN: CPT | Performed by: CLINICAL NURSE SPECIALIST

## 2019-10-09 PROCEDURE — 36415 COLL VENOUS BLD VENIPUNCTURE: CPT | Performed by: CLINICAL NURSE SPECIALIST

## 2019-10-09 PROCEDURE — 82043 UR ALBUMIN QUANTITATIVE: CPT | Performed by: CLINICAL NURSE SPECIALIST

## 2019-10-09 PROCEDURE — 83036 HEMOGLOBIN GLYCOSYLATED A1C: CPT | Performed by: CLINICAL NURSE SPECIALIST

## 2019-10-09 RX ORDER — SIMVASTATIN 40 MG
40 TABLET ORAL AT BEDTIME
Qty: 90 TABLET | Refills: 3 | Status: SHIPPED | OUTPATIENT
Start: 2019-10-09 | End: 2020-03-06

## 2019-10-09 RX ORDER — LISINOPRIL 20 MG/1
20 TABLET ORAL 2 TIMES DAILY
Qty: 180 TABLET | Refills: 3 | Status: SHIPPED | OUTPATIENT
Start: 2019-10-09 | End: 2020-12-31

## 2019-10-09 NOTE — PROGRESS NOTES
A1c  Endocrinology Clinic Note:  Name: Aldo Sharpe  F/u for Diabetes (Last seen 6/20/2019).  HPI:  Aldo Sharpe is a 49 year old male who presents for the management of type 1 diabetes.  Previously saw Dr. Yung.  Likes Dr. Yung - says it's hard to get an appointment because her schedule is so full.     1. Type 1 DM:  Orginally diagnosed at the age of: 15- years old  Hospitalization for DKA: none  Current Regimen: Medtronic 670 G.  Started pump therapy 1/2019.    Current Regimen:   Insulin pump - Auto mode 95%  Humalog + Metformin XR 1000 mg bid.  Using [employer] meter - strips provided free of charge by insurance.  Time Rate (U/hr)   0000 1.90                 Carbohydrate Ratio -    Time Ratio   0000 4         Sensitivity   20   Active Insulin Time  3:00 hours   Basal  32% (34 units)   Bolus   68% (72 units )   Total Carbohydrates/day  265 grams   Total Insulin/day   106 units   Average Blood Sugar  Average sensor glucose  BG range 155  141       BS Checks  9 times a day   Care Link Username-   Password-   Changes set q 2.6 days.  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  Fixed meal pattern: yes  Patient counting carbs: sometimes    DM Complications:   Complications:   Diabetes Complications  Description / Detail    Diabetic Retinopathy  No retinopathy - last exam about one year ago, plans to get an exam    CAD / PAD  No   Neuropathy  No   Nephropathy / Microalbuminuria  No   Gastroparesis  No   Hypoglycemia [place]  No     Erectile dysfunction secondary to diabetes.  Started experiencing difficulties obtaining and maintaining erections.  Was given a prescription of Viagra but insurance denied coverage - denial letter states his Rx benefit plan davidson not cover the requested medication.  Has never had the opportunity to try Viagra.  2. Hypertension: Blood Pressure today:   BP Readings from  Last 3 Encounters:   10/09/19 125/77   06/20/19 134/80   03/20/19 136/73   On medications - Amlodipine 5 mg daily, Lisinopril 20 mg bid .  3. Hyperlipidemia:  On medication - Simvastatin 40 mg daily.  PMH/PSH:  Past Medical History:   Diagnosis Date     Diabetic eye exam (H) 03/18/14     Hyperlipidemia LDL goal <100 10/31/2010     Type 1 diabetes, HbA1c goal < 7% (H) 10/31/2010     No past surgical history on file.  Family Hx:  No family history on file.        DM2:           Social Hx:  Social History     Socioeconomic History     Marital status:      Spouse name: Not on file     Number of children: Not on file     Years of education: Not on file     Highest education level: Not on file   Occupational History     Not on file   Social Needs     Financial resource strain: Not on file     Food insecurity:     Worry: Not on file     Inability: Not on file     Transportation needs:     Medical: Not on file     Non-medical: Not on file   Tobacco Use     Smoking status: Never Smoker     Smokeless tobacco: Never Used   Substance and Sexual Activity     Alcohol use: No     Comment: none     Drug use: No     Sexual activity: Yes     Partners: Female   Lifestyle     Physical activity:     Days per week: Not on file     Minutes per session: Not on file     Stress: Not on file   Relationships     Social connections:     Talks on phone: Not on file     Gets together: Not on file     Attends Zoroastrianism service: Not on file     Active member of club or organization: Not on file     Attends meetings of clubs or organizations: Not on file     Relationship status: Not on file     Intimate partner violence:     Fear of current or ex partner: Not on file     Emotionally abused: Not on file     Physically abused: Not on file     Forced sexual activity: Not on file   Other Topics Concern     Parent/sibling w/ CABG, MI or angioplasty before 65F 55M? Not Asked   Social History Narrative     Not on file          MEDICATIONS:  has a  current medication list which includes the following prescription(s): lisinopril, metformin, simvastatin, acetone urine, amlodipine, aspirin, b-d u/f, blood glucose, fish oil, glucagon, insulin lispro, multivitamin w/minerals, omega 3, sildenafil, and sildenafil.    ROS     ROS: 10 point ROS neg other than the symptoms noted above in the HPI.    Physical Exam   VS: /77 (BP Location: Right arm, Patient Position: Chair, Cuff Size: Adult Large)   Pulse 98   Temp 98  F (36.7  C) (Oral)   Resp 12   Wt 104.3 kg (230 lb)   BMI 32.08 kg/m    GENERAL: AXOX3, NAD, well dressed, answering questions appropriately, appears stated age.  HEENT: No exopthalmous, no proptosis, no lig lag, no retraction  NECK:  Supple, no thyromegaly or adenopathy  CV: RRR, no rubs, gallops, no murmurs  LUNGS: CTAB, no wheezes, rales, or ronchi  EXTREMITIES: no edema  NEUROLOGY: CN grossly intact, no tremors  MSK: grossly intact   PSYCH: normal affect and mood    LABS:  A1c:  Component      Latest Ref Rng & Units 3/20/2019 6/20/2019 10/9/2019   Hemoglobin A1C      0 - 5.6 % 7.0 (H) 6.8 (H) 6.9 (H)     BMP:  !COMPREHENSIVE Latest Ref Rng & Units 6/20/2019   SODIUM 133 - 144 mmol/L 141   POTASSIUM 3.4 - 5.3 mmol/L 4.5   CHLORIDE 94 - 109 mmol/L 107   BUN 7 - 30 mg/dL 17   Creatinine 0.66 - 1.25 mg/dL 0.87   Glucose 70 - 99 mg/dL 134 (H)   ANION GAP 3 - 14 mmol/L 9   CALCIUM 8.5 - 10.1 mg/dL 8.6   ALBUMIN 3.4 - 5.0 g/dL 3.6     Urine Micro:  Component      Latest Ref Rng & Units 7/10/2018   Creatinine Urine      mg/dL 154   Albumin Urine mg/L      mg/L 9   Albumin Urine mg/g Cr      0 - 17 mg/g Cr 6.10     LFTs/Lipids:  !LIPID/HEPATIC Latest Ref Rng & Units 6/20/2019   CHOLESTEROL <200 mg/dL 124   TRIGLYCERIDES <150 mg/dL 74   HDL CHOLESTEROL >39 mg/dL 39 (L)   LDL CHOLESTEROL, CALCULATED <100 mg/dL 70   VLDL-CHOLESTEROL 0 - 30 mg/dL    NON HDL CHOLESTEROL <130 mg/dL 85   CHOLESTEROL/HDL RATIO 0.0 - 5.0    AST 0 - 45 U/L 15   ALT 0 - 70 U/L  27     TFTs:  !THYROID Latest Ref Rng & Units 6/20/2019   TSH 0.40 - 4.00 mU/L 3.21       Blood Glucose and pump data/ Meter reviewed.     All pertinent notes, labs, and images personally reviewed by me.     A/P  Mr.David INGRID Sharpe is a 49 year old here for the management of diabetes:    1. DM1 - Controlled. No known complications from DM.  Historically blood glucose has been under good control.  No pump setting changes at this time.  Continue Metformin 1000 mg bid.  Rx for glucagon emergency kit and urine ketone strips provided 3/2019.    He has Lantus at home for use if pump fails.    Recommend checking blood sugars as required for calibration of his CGM.    If Blood glucose are low more often-> 2-3 times/week- give us a call.  The patient is advised to continue making good food choices: reduce carbs, Reduce portion size, weight loss and exercise 3-4 times a week.  Discussed hypoglycemia signs and symptoms as well as management in detail.    2. Hypertension - Controlled    3. Hyperlipidemia - On statin therapy.  4. Prevention  Flu Shot- 9/8/2019 - received at [facility]  Pneumovax- Conj 13-V: 12/2015; 23 valent: 12/2012.  Opthalmology- annually (wife works as )  ASA- 81 mg  Smoking- no    Follow-up:  follow up in 3 month(s)    Lisette Dyer NP  Endocrinology  [facility]  CC: Kaleb Rosenthal    More than 50% of face to face time spent with Mr. Sharpe on counseling / coordinating his care - discussing prevention of diabetes complications (see above plan), insulin pump and sensor trouble shooting, and discussing/reviewing current diabetic-related concerns and/or complaints..     All questions were answered.  The patient indicates understanding of the above issues and agrees with the plan set forth. 25 min.

## 2019-10-09 NOTE — PATIENT INSTRUCTIONS
Today's A1c is in target range.  Keep up the good work.  Component      Latest Ref Rng & Units 3/20/2019 6/20/2019 10/9/2019   Hemoglobin A1C      0 - 5.6 % 7.0 (H) 6.8 (H) 6.9 (H)       Just FYI: in the event of insulin pump failure - you would inject 34 units of Lantus every 24 hours (this is the amount of basal insulin your pump currently gives you).    Follow up in 3 months.    Lisette Dyer NP  Endocrinology

## 2019-10-09 NOTE — LETTER
10/9/2019         RE: Aldo Sharpe  [address] [place] [id number]        Dear Colleague,    Thank you for referring your patient, Aldo Sharpe, to the [facility]. Please see a copy of my visit note below.    A1c  Endocrinology Clinic Note:  Name: Aldo Sharpe  F/u for Diabetes (Last seen 6/20/2019).  HPI:  Aldo Sharpe is a 49 year old male who presents for the management of type 1 diabetes.  Previously saw Dr. Ynug.  Likes Dr. Yung - says it's hard to get an appointment because her schedule is so full.     1. Type 1 DM:  Orginally diagnosed at the age of: 15- years old  Hospitalization for DKA: none  Current Regimen: Medtronic 670 G.  Started pump therapy 1/2019.    Current Regimen:   Insulin pump - Auto mode 95%  Humalog + Metformin XR 1000 mg bid.  Using Octonotcoo meter - strips provided free of charge by insurance.  Time Rate (U/hr)   0000 1.90                 Carbohydrate Ratio -    Time Ratio   0000 4         Sensitivity   20   Active Insulin Time  3:00 hours   Basal  32% (34 units)   Bolus   68% (72 units )   Total Carbohydrates/day  265 grams   Total Insulin/day   106 units   Average Blood Sugar  Average sensor glucose  BG range 155  141       BS Checks  9 times a day   Care Link Username-   Password-   Changes set q 2.6 days.  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  Fixed meal pattern: yes  Patient counting carbs: sometimes    DM Complications:   Complications:   Diabetes Complications  Description / Detail    Diabetic Retinopathy  No retinopathy - last exam about one year ago, plans to get an exam    CAD / PAD  No   Neuropathy  No   Nephropathy / Microalbuminuria  No   Gastroparesis  No   Hypoglycemia Unawarness  No     Erectile dysfunction secondary to diabetes.  Started experiencing difficulties obtaining and maintaining erections.  Was given  a prescription of Viagra but insurance denied coverage - denial letter states his Rx benefit plan davidson not cover the requested medication.  Has never had the opportunity to try Viagra.  2. Hypertension: Blood Pressure today:   BP Readings from Last 3 Encounters:   10/09/19 125/77   06/20/19 134/80   03/20/19 136/73   On medications - Amlodipine 5 mg daily, Lisinopril 20 mg bid .  3. Hyperlipidemia:  On medication - Simvastatin 40 mg daily.  PMH/PSH:  Past Medical History:   Diagnosis Date     Diabetic eye exam (H) 03/18/14     Hyperlipidemia LDL goal <100 10/31/2010     Type 1 diabetes, HbA1c goal < 7% (H) 10/31/2010     No past surgical history on file.  Family Hx:  No family history on file.        DM2:           Social Hx:  Social History     Socioeconomic History     Marital status:      Spouse name: Not on file     Number of children: Not on file     Years of education: Not on file     Highest education level: Not on file   Occupational History     Not on file   Social Needs     Financial resource strain: Not on file     Food insecurity:     Worry: Not on file     Inability: Not on file     Transportation needs:     Medical: Not on file     Non-medical: Not on file   Tobacco Use     Smoking status: Never Smoker     Smokeless tobacco: Never Used   Substance and Sexual Activity     Alcohol use: No     Comment: none     Drug use: No     Sexual activity: Yes     Partners: Female   Lifestyle     Physical activity:     Days per week: Not on file     Minutes per session: Not on file     Stress: Not on file   Relationships     Social connections:     Talks on phone: Not on file     Gets together: Not on file     Attends Islam service: Not on file     Active member of club or organization: Not on file     Attends meetings of clubs or organizations: Not on file     Relationship status: Not on file     Intimate partner violence:     Fear of current or ex partner: Not on file     Emotionally abused: Not on file      Physically abused: Not on file     Forced sexual activity: Not on file   Other Topics Concern     Parent/sibling w/ CABG, MI or angioplasty before 65F 55M? Not Asked   Social History Narrative     Not on file          MEDICATIONS:  has a current medication list which includes the following prescription(s): lisinopril, metformin, simvastatin, acetone urine, amlodipine, aspirin, b-d u/f, blood glucose, fish oil, glucagon, insulin lispro, multivitamin w/minerals, omega 3, sildenafil, and sildenafil.    ROS     ROS: 10 point ROS neg other than the symptoms noted above in the HPI.    Physical Exam   VS: /77 (BP Location: Right arm, Patient Position: Chair, Cuff Size: Adult Large)   Pulse 98   Temp 98  F (36.7  C) (Oral)   Resp 12   Wt 104.3 kg (230 lb)   BMI 32.08 kg/m     GENERAL: AXOX3, NAD, well dressed, answering questions appropriately, appears stated age.  HEENT: No exopthalmous, no proptosis, no lig lag, no retraction  NECK:  Supple, no thyromegaly or adenopathy  CV: RRR, no rubs, gallops, no murmurs  LUNGS: CTAB, no wheezes, rales, or ronchi  EXTREMITIES: no edema  NEUROLOGY: CN grossly intact, no tremors  MSK: grossly intact   PSYCH: normal affect and mood    LABS:  A1c:  Component      Latest Ref Rng & Units 3/20/2019 6/20/2019 10/9/2019   Hemoglobin A1C      0 - 5.6 % 7.0 (H) 6.8 (H) 6.9 (H)     BMP:  !COMPREHENSIVE Latest Ref Rng & Units 6/20/2019   SODIUM 133 - 144 mmol/L 141   POTASSIUM 3.4 - 5.3 mmol/L 4.5   CHLORIDE 94 - 109 mmol/L 107   BUN 7 - 30 mg/dL 17   Creatinine 0.66 - 1.25 mg/dL 0.87   Glucose 70 - 99 mg/dL 134 (H)   ANION GAP 3 - 14 mmol/L 9   CALCIUM 8.5 - 10.1 mg/dL 8.6   ALBUMIN 3.4 - 5.0 g/dL 3.6     Urine Micro:  Component      Latest Ref Rng & Units 7/10/2018   Creatinine Urine      mg/dL 154   Albumin Urine mg/L      mg/L 9   Albumin Urine mg/g Cr      0 - 17 mg/g Cr 6.10     LFTs/Lipids:  !LIPID/HEPATIC Latest Ref Rng & Units 6/20/2019   CHOLESTEROL <200 mg/dL 124    TRIGLYCERIDES <150 mg/dL 74   HDL CHOLESTEROL >39 mg/dL 39 (L)   LDL CHOLESTEROL, CALCULATED <100 mg/dL 70   VLDL-CHOLESTEROL 0 - 30 mg/dL    NON HDL CHOLESTEROL <130 mg/dL 85   CHOLESTEROL/HDL RATIO 0.0 - 5.0    AST 0 - 45 U/L 15   ALT 0 - 70 U/L 27     TFTs:  !THYROID Latest Ref Rng & Units 6/20/2019   TSH 0.40 - 4.00 mU/L 3.21       Blood Glucose and pump data/ Meter reviewed.     All pertinent notes, labs, and images personally reviewed by me.     A/P  Mr.David INGRID Sharpe is a 49 year old here for the management of diabetes:    1. DM1 - Controlled. No known complications from DM.  Historically blood glucose has been under good control.  No pump setting changes at this time.  Continue Metformin 1000 mg bid.  Rx for glucagon emergency kit and urine ketone strips provided 3/2019.    He has Lantus at home for use if pump fails.    Recommend checking blood sugars as required for calibration of his CGM.    If Blood glucose are low more often-> 2-3 times/week- give us a call.  The patient is advised to continue making good food choices: reduce carbs, Reduce portion size, weight loss and exercise 3-4 times a week.  Discussed hypoglycemia signs and symptoms as well as management in detail.    2. Hypertension - Controlled    3. Hyperlipidemia - On statin therapy.  4. Prevention  Flu Shot- 9/8/2019 - received at [facility]  Pneumovax- Conj 13-V: 12/2015; 23 valent: 12/2012.  Opthalmology- annually (wife works as )  ASA- 81 mg  Smoking- no    Follow-up:  follow up in 3 month(s)    Lisette Dyer NP  Endocrinology  [facility]  CC: Kaleb Rosenthal    More than 50% of face to face time spent with Mr. Sharpe on counseling / coordinating his care - discussing prevention of diabetes complications (see above plan), insulin pump and sensor trouble shooting, and discussing/reviewing current diabetic-related concerns and/or complaints..     All questions were answered.  The patient indicates understanding of the above  issues and agrees with the plan set forth. 25 min.         Again, thank you for allowing me to participate in the care of your patient.        Sincerely,        ALEXIA Dyer CNP

## 2019-10-10 LAB
CREAT UR-MCNC: 55 MG/DL
MICROALBUMIN UR-MCNC: 5 MG/L
MICROALBUMIN/CREAT UR: 9.03 MG/G CR (ref 0–17)

## 2019-10-10 NOTE — RESULT ENCOUNTER NOTE
Aldo,  There was no abnormal or elevated protein in your urine (a good thing)!  Here's a copy of your recent lab results for your records.  Lisette Dyer NP  Endocrinology

## 2019-11-07 ENCOUNTER — MEDICAL CORRESPONDENCE (OUTPATIENT)
Dept: HEALTH INFORMATION MANAGEMENT | Facility: CLINIC | Age: 49
End: 2019-11-07

## 2019-11-07 DIAGNOSIS — I10 ESSENTIAL HYPERTENSION, BENIGN: ICD-10-CM

## 2019-11-07 NOTE — LETTER
[facility]  [address] [address]  [phone]            Aldo Sharpe  [address] [place] [id number]        November 8, 2019      Dear Aldo,    We received a refill request for amLODIPine (NORVASC) 5 MG tablet.  This prescription was refilled on 10/2/19 for a 3 month supply.    You are DUE for your annual physical exam with Dr. Rosenthal.  Before any additional refills can be approved, you need to be seen in clinic for an appointment.    Please call us at [phone] (or use [facility]) to schedule an appointment with Dr. Rosenthal for your annual physical exam.        Sincerely,    Dr. Kaleb Rosenthal    [facility]

## 2019-11-08 RX ORDER — AMLODIPINE BESYLATE 5 MG/1
TABLET ORAL
Qty: 90 TABLET | Refills: 0 | OUTPATIENT
Start: 2019-11-08

## 2019-11-08 NOTE — TELEPHONE ENCOUNTER
Refill refused: Duplicate (RX ordered on 10/2 for 3 month supply - pt needs APPT for refills.)    Letter mailed to home address.

## 2019-11-08 NOTE — TELEPHONE ENCOUNTER
"Requested Prescriptions   Pending Prescriptions Disp Refills     amLODIPine (NORVASC) 5 MG tablet [Pharmacy Med Name: AMLODIPINE TAB 5MG] 90 tablet 0     Sig: TAKE 1 TABLET DAILY DUE FORANNUAL PHYSICAL EXAM WITH  DR. ROSENTHAL FORREFILLS  Last Written Prescription Date:  10/02/2019  Last Fill Quantity: 90 tablet,  # refills: 0   Last office visit: 7/10/2018 with prescribing provider:  Kaleb Rosenthal MD    Future Office Visit:   Next 5 appointments (look out 90 days)    Jan 09, 2020 10:30 AM CST  (Arrive by 10:15 AM)  Return Visit with ALEXIA Dyer CNP  [facility] [address] [facility] [address]  [phone]                Calcium Channel Blockers Protocol  Failed - 11/7/2019  7:18 PM        Failed - Recent (12 mo) or future (30 days) visit within the authorizing provider's specialty     Patient has had an office visit with the authorizing provider or a provider within the authorizing providers department within the previous 12 mos or has a future within next 30 days. See \"Patient Info\" tab in inbasket, or \"Choose Columns\" in Meds & Orders section of the refill encounter.              Passed - Blood pressure under 140/90 in past 12 months     BP Readings from Last 3 Encounters:   10/09/19 125/77   06/20/19 134/80   03/20/19 136/73                 Passed - Medication is active on med list        Passed - Patient is age 18 or older        Passed - Normal serum creatinine on file in past 12 months     Recent Labs   Lab Test 06/20/19  0925   CR 0.87               "

## 2019-12-04 ENCOUNTER — MYC REFILL (OUTPATIENT)
Dept: ENDOCRINOLOGY | Facility: CLINIC | Age: 49
End: 2019-12-04

## 2019-12-04 DIAGNOSIS — E78.5 HYPERLIPIDEMIA LDL GOAL <100: ICD-10-CM

## 2019-12-05 RX ORDER — SIMVASTATIN 40 MG
40 TABLET ORAL AT BEDTIME
Qty: 90 TABLET | Refills: 3 | OUTPATIENT
Start: 2019-12-05

## 2019-12-05 NOTE — TELEPHONE ENCOUNTER
"Requested Prescriptions   Pending Prescriptions Disp Refills     simvastatin (ZOCOR) 40 MG tablet 90 tablet 3     Sig: Take 1 tablet (40 mg) by mouth At Bedtime       Statins Protocol Passed - 12/4/2019 12:36 PM        Passed - LDL on file in past 12 months     Recent Labs   Lab Test 06/20/19  0925   LDL 70             Passed - No abnormal creatine kinase in past 12 months     No lab results found.             Passed - Recent (12 mo) or future (30 days) visit within the authorizing provider's specialty     Patient has had an office visit with the authorizing provider or a provider within the authorizing providers department within the previous 12 mos or has a future within next 30 days. See \"Patient Info\" tab in inbasket, or \"Choose Columns\" in Meds & Orders section of the refill encounter.              Passed - Medication is active on med list        Passed - Patient is age 18 or older        Medication refused, refill requested too soon. Prescription ordered on 10/9/19 for 90 day supply with 3 additional refill.    "

## 2019-12-31 ENCOUNTER — MYC REFILL (OUTPATIENT)
Dept: INTERNAL MEDICINE | Facility: CLINIC | Age: 49
End: 2019-12-31

## 2019-12-31 DIAGNOSIS — I10 ESSENTIAL HYPERTENSION, BENIGN: ICD-10-CM

## 2020-01-02 RX ORDER — AMLODIPINE BESYLATE 5 MG/1
TABLET ORAL
Qty: 90 TABLET | Refills: 0 | OUTPATIENT
Start: 2020-01-02

## 2020-01-02 NOTE — TELEPHONE ENCOUNTER
"Requested Prescriptions   Pending Prescriptions Disp Refills     amLODIPine (NORVASC) 5 MG tablet 90 tablet 0       Calcium Channel Blockers Protocol  Failed - 12/31/2019 11:21 AM        Failed - Recent (12 mo) or future (30 days) visit within the authorizing provider's specialty     Patient has had an office visit with the authorizing provider or a provider within the authorizing providers department within the previous 12 mos or has a future within next 30 days. See \"Patient Info\" tab in inbasket, or \"Choose Columns\" in Meds & Orders section of the refill encounter.              Passed - Blood pressure under 140/90 in past 12 months     BP Readings from Last 3 Encounters:   10/09/19 125/77   06/20/19 134/80   03/20/19 136/73                 Passed - Medication is active on med list        Passed - Patient is age 18 or older        Passed - Normal serum creatinine on file in past 12 months     Recent Labs   Lab Test 06/20/19  0925   CR 0.87             Last Written Prescription Date:  10/2/2019  Last Fill Quantity: 90,  # refills: 0   Last office visit: 7/10/2018 with prescribing provider:  Kaleb Rosenthal     Future Office Visit:   Next 5 appointments (look out 90 days)    Jan 09, 2020 10:30 AM CST  (Arrive by 10:15 AM)  Return Visit with ALEXIA Dyer CNP  [facility] [address]  [facility] [id number] [phone]         Routing refill request to provider for review/approval because:  Jannette given x1 and patient did not follow up, please advise  Patient needs to be seen because it has been more than 1 year since last office visit.      "

## 2020-01-07 DIAGNOSIS — E10.9 TYPE 1 DIABETES MELLITUS WITHOUT COMPLICATION (H): ICD-10-CM

## 2020-01-07 LAB — HBA1C MFR BLD: 6.6 % (ref 0–5.6)

## 2020-01-07 PROCEDURE — 83036 HEMOGLOBIN GLYCOSYLATED A1C: CPT | Performed by: CLINICAL NURSE SPECIALIST

## 2020-01-07 PROCEDURE — 36415 COLL VENOUS BLD VENIPUNCTURE: CPT | Performed by: CLINICAL NURSE SPECIALIST

## 2020-01-09 ENCOUNTER — TELEPHONE (OUTPATIENT)
Dept: ENDOCRINOLOGY | Facility: CLINIC | Age: 50
End: 2020-01-09

## 2020-01-09 DIAGNOSIS — I10 ESSENTIAL HYPERTENSION, BENIGN: ICD-10-CM

## 2020-01-09 RX ORDER — AMLODIPINE BESYLATE 5 MG/1
TABLET ORAL
Qty: 30 TABLET | Refills: 0 | Status: SHIPPED | OUTPATIENT
Start: 2020-01-09 | End: 2020-02-03

## 2020-01-09 NOTE — TELEPHONE ENCOUNTER
Patient called today.    Patient has a current appointment with Lisette Dyer CNP at [facility] on January 21.    Patient is out of his medication for BP.    Patient is wondering if can get more until appointment?    Patient would like to pickup at the [facility] in [place].    Please contact patient.    Thank you.    Central Scheduling  Johana DAVEY

## 2020-01-09 NOTE — TELEPHONE ENCOUNTER
Patient originally had an appointment on 1/09/20 but had to reschedule for 1/21/20 as provider cancelled clinic.  Routing to the RN pool to address since patient is out of medication and provider is out of clinic until the afternoon of 1/15/20.   Sharlene Gallegos CMA on 1/9/2020 at 2:08 PM

## 2020-01-11 ENCOUNTER — MYC REFILL (OUTPATIENT)
Dept: ENDOCRINOLOGY | Facility: CLINIC | Age: 50
End: 2020-01-11

## 2020-01-11 DIAGNOSIS — E10.9 TYPE 1 DIABETES MELLITUS WITHOUT COMPLICATION (H): ICD-10-CM

## 2020-01-13 NOTE — TELEPHONE ENCOUNTER
Duplicate but patient using a different pharmacy.    Prescription approved per [facility] Refill Protocol.

## 2020-01-15 NOTE — RESULT ENCOUNTER NOTE
Aldo,  Here's a copy of your recent A1c result for your records.  I'll see you on the 21st at your upcoming appointment.  Lisette Dyer NP  Endocrinology

## 2020-02-02 DIAGNOSIS — I10 ESSENTIAL HYPERTENSION, BENIGN: ICD-10-CM

## 2020-02-03 RX ORDER — AMLODIPINE BESYLATE 5 MG/1
TABLET ORAL
Qty: 15 TABLET | Refills: 0 | Status: SHIPPED | OUTPATIENT
Start: 2020-02-03 | End: 2020-02-27

## 2020-02-03 NOTE — TELEPHONE ENCOUNTER
"Requested Prescriptions   Pending Prescriptions Disp Refills     amLODIPine (NORVASC) 5 MG tablet [Pharmacy Med Name: AMLODIPINE BESYLATE 5 MG TAB] 30 tablet 0     Sig: TAKE 1 TABLET BY MOUTH EVERY DAY         Last Written Prescription Date:  1/9/20  Last Fill Quantity: 30,   # refills: 0  Last Office Visit: 7/10/18  Future Office visit:    Next 5 appointments (look out 90 days)    Apr 01, 2020  4:30 PM CDT  Return Visit with ALEXIA Dyer CNP  [facility] ([facility]) [address] [facility] [address]  [phone]           Routing refill request to provider for review/approval because:  Jannette given x1 and patient did not follow up, please advise            Calcium Channel Blockers Protocol  Passed - 2/2/2020 11:35 AM        Passed - Blood pressure under 140/90 in past 12 months     BP Readings from Last 3 Encounters:   10/09/19 125/77   06/20/19 134/80   03/20/19 136/73                 Passed - Recent (12 mo) or future (30 days) visit within the authorizing provider's specialty     Patient has had an office visit with the authorizing provider or a provider within the authorizing providers department within the previous 12 mos or has a future within next 30 days. See \"Patient Info\" tab in inbasket, or \"Choose Columns\" in Meds & Orders section of the refill encounter.              Passed - Medication is active on med list        Passed - Patient is age 18 or older        Passed - Normal serum creatinine on file in past 12 months     Recent Labs   Lab Test 06/20/19  0925   CR 0.87               "

## 2020-02-10 ENCOUNTER — TRANSFERRED RECORDS (OUTPATIENT)
Dept: HEALTH INFORMATION MANAGEMENT | Facility: CLINIC | Age: 50
End: 2020-02-10

## 2020-02-10 LAB — RETINOPATHY: NEGATIVE

## 2020-02-19 DIAGNOSIS — I10 ESSENTIAL HYPERTENSION, BENIGN: ICD-10-CM

## 2020-02-19 RX ORDER — AMLODIPINE BESYLATE 5 MG/1
TABLET ORAL
Qty: 90 TABLET | Refills: 0 | OUTPATIENT
Start: 2020-02-19

## 2020-02-19 NOTE — TELEPHONE ENCOUNTER
"Requested Prescriptions   Pending Prescriptions Disp Refills     amLODIPine 5 MG PO tablet 15 tablet 0     Sig: TAKE 1 TABLET BY MOUTH EVERY DAY       Calcium Channel Blockers Protocol  Failed - 2/19/2020 10:19 AM        Failed - Recent (12 mo) or future (30 days) visit within the authorizing provider's specialty     Patient has had an office visit with the authorizing provider or a provider within the authorizing providers department within the previous 12 mos or has a future within next 30 days. See \"Patient Info\" tab in inbasket, or \"Choose Columns\" in Meds & Orders section of the refill encounter.              Passed - Blood pressure under 140/90 in past 12 months     BP Readings from Last 3 Encounters:   10/09/19 125/77   06/20/19 134/80   03/20/19 136/73                 Passed - Medication is active on med list        Passed - Patient is age 18 or older        Passed - Normal serum creatinine on file in past 12 months     Recent Labs   Lab Test 06/20/19  0925   CR 0.87             Routing refill request to provider for review/approval because:  Patient needs to be seen because it has been more than 1 year since last office visit.  Pt did have evisit 10/1/19        "

## 2020-02-20 DIAGNOSIS — I10 ESSENTIAL HYPERTENSION, BENIGN: ICD-10-CM

## 2020-02-20 RX ORDER — AMLODIPINE BESYLATE 5 MG/1
TABLET ORAL
Qty: 15 TABLET | Refills: 0 | Status: CANCELLED | OUTPATIENT
Start: 2020-02-20

## 2020-02-20 NOTE — TELEPHONE ENCOUNTER
"Requested Prescriptions   Pending Prescriptions Disp Refills     amLODIPine 5 MG PO tablet  Last Written Prescription Date:  02/03/2020  Last Fill Quantity: 15,  # refills: 0   Last Office Visit: 7/10/2018   Future Office Visit:    Next 5 appointments (look out 90 days)    Apr 01, 2020  4:30 PM CDT  Return Visit with ALEXIA Dyer CNP  [facility] ([facility]) [address] [facility] [id number] [phone]          15 tablet 0     Sig: TAKE 1 TABLET BY MOUTH EVERY DAY       Calcium Channel Blockers Protocol  Failed - 2/20/2020  2:55 PM        Failed - Recent (12 mo) or future (30 days) visit within the authorizing provider's specialty     Patient has had an office visit with the authorizing provider or a provider within the authorizing providers department within the previous 12 mos or has a future within next 30 days. See \"Patient Info\" tab in inbasket, or \"Choose Columns\" in Meds & Orders section of the refill encounter.              Passed - Blood pressure under 140/90 in past 12 months     BP Readings from Last 3 Encounters:   10/09/19 125/77   06/20/19 134/80   03/20/19 136/73                 Passed - Medication is active on med list        Passed - Patient is age 18 or older        Passed - Normal serum creatinine on file in past 12 months     Recent Labs   Lab Test 06/20/19  0925   CR 0.87               "

## 2020-02-26 DIAGNOSIS — I10 ESSENTIAL HYPERTENSION, BENIGN: ICD-10-CM

## 2020-02-26 RX ORDER — AMLODIPINE BESYLATE 5 MG/1
TABLET ORAL
Qty: 15 TABLET | Refills: 0 | OUTPATIENT
Start: 2020-02-26

## 2020-02-26 NOTE — TELEPHONE ENCOUNTER
Routing refill request to provider for review/approval because:  Jannette given x1 and patient did not follow up, please advise  Isa Huerta RN

## 2020-02-27 RX ORDER — AMLODIPINE BESYLATE 5 MG/1
TABLET ORAL
Qty: 90 TABLET | Refills: 0 | Status: SHIPPED | OUTPATIENT
Start: 2020-02-27 | End: 2020-05-28

## 2020-02-27 NOTE — TELEPHONE ENCOUNTER
Pt is trying to get these RF requests sent to Lisette Dyer. Pt has appt on 4/1/2020 and was told by Dr. Rosenthal to have the BP meds rx'd by endocrinology.

## 2020-03-04 DIAGNOSIS — E78.5 HYPERLIPIDEMIA LDL GOAL <100: ICD-10-CM

## 2020-03-04 DIAGNOSIS — E10.9 TYPE 1 DIABETES MELLITUS WITHOUT COMPLICATION (H): Primary | ICD-10-CM

## 2020-03-04 NOTE — TELEPHONE ENCOUNTER
"simvastatin (ZOCOR) 40 MG tablet    Last Written Prescription Date:  10/9/2019  Last Fill Quantity: 90,  # refills: 3   Last office visit: 10/9/2019 with prescribing provider:  Lisette Dyer  Future Office Visit:   Next 5 appointments (look out 90 days)    Apr 01, 2020  4:30 PM CDT  Return Visit with ALEXIA Dyer CNP  [facility] [address]  [facility] [address]  [phone]        insulin aspart (NOVOLOG VIAL) 100 UNITS/ML vial     Last Written Prescription Date:  3/20/2019  Last Fill Quantity: 16 vial,  # refills: 3   Last office visit: 10/9/2019 with prescribing provider:  Lisette Dyer   Future Office Visit:   Next 5 appointments (look out 90 days)    Apr 01, 2020  4:30 PM CDT  Return Visit with ALEXIA Dyer CNP  [facility] [address] [facility] [address]  [phone]           Requested Prescriptions   Pending Prescriptions Disp Refills     simvastatin (ZOCOR) 40 MG tablet 90 tablet 3     Sig: Take 1 tablet (40 mg) by mouth At Bedtime       Statins Protocol Passed - 3/4/2020 12:21 PM        Passed - LDL on file in past 12 months     Recent Labs   Lab Test 06/20/19  0925   LDL 70             Passed - No abnormal creatine kinase in past 12 months     No lab results found.             Passed - Recent (12 mo) or future (30 days) visit within the authorizing provider's specialty     Patient has had an office visit with the authorizing provider or a provider within the authorizing providers department within the previous 12 mos or has a future within next 30 days. See \"Patient Info\" tab in inbasket, or \"Choose Columns\" in Meds & Orders section of the refill encounter.              Passed - Medication is active on med list        Passed - Patient is age 18 or older        insulin aspart (NOVOLOG VIAL) 100 UNITS/ML vial 16 vial 3     Sig: About 170 units with insulin pump " "      Short Acting Insulin Protocol Failed - 3/4/2020 12:21 PM        Failed - Medication is active on med list        Passed - Blood pressure less than 140/90 in past 6 months     BP Readings from Last 3 Encounters:   10/09/19 125/77   06/20/19 134/80   03/20/19 136/73                 Passed - LDL on file in past 12 months     Recent Labs   Lab Test 06/20/19  0925   LDL 70             Passed - Microalbumin on file in past 12 months     Recent Labs   Lab Test 10/09/19  1231  02/01/12   MICROALB  --   --  <3   MICROL 5   < >  --    UMALCR 9.03   < >  --     < > = values in this interval not displayed.             Passed - Serum creatinine on file in past 12 months     Recent Labs   Lab Test 06/20/19  0925   CR 0.87             Passed - HgbA1C in past 3 or 6 months     If HgbA1C is 8 or greater, it needs to be on file within the past 3 months.  If less than 8, must be on file within the past 6 months.     Recent Labs   Lab Test 01/07/20  1721   A1C 6.6*             Passed - Patient is age 18 or older        Passed - Recent (6 mo) or future (30 days) visit within the authorizing provider's specialty     Patient had office visit in the last 6 months or has a visit in the next 30 days with authorizing provider or within the authorizing provider's specialty.  See \"Patient Info\" tab in inbasket, or \"Choose Columns\" in Meds & Orders section of the refill encounter.              "

## 2020-03-04 NOTE — TELEPHONE ENCOUNTER
Reason for call:  Other   Patient called regarding (reason for call): prescription  Additional comments: Patient need to switch insulin brand to NOVOLOG  due to insurance.  Will need new prescription.    Patient also need refill for simvastatin (ZOCOR) 40 MG tablet  Pt use [facility]    Phone number to reach patient:  Home number on file [phone] (home)    Best Time:  any    Can we leave a detailed message on this number?  YES    Travel screening: Not Applicable

## 2020-03-04 NOTE — TELEPHONE ENCOUNTER
Pt not seen here at [place]  Routing to patient's endo provider who has been managing these meds  Sofya WESTBROOK RN

## 2020-03-06 RX ORDER — SIMVASTATIN 40 MG
40 TABLET ORAL AT BEDTIME
Qty: 90 TABLET | Refills: 0 | Status: SHIPPED | OUTPATIENT
Start: 2020-03-06 | End: 2020-12-16

## 2020-04-01 ENCOUNTER — VIRTUAL VISIT (OUTPATIENT)
Dept: ENDOCRINOLOGY | Facility: CLINIC | Age: 50
End: 2020-04-01
Payer: COMMERCIAL

## 2020-04-01 DIAGNOSIS — Z96.41 INSULIN PUMP IN PLACE: Primary | ICD-10-CM

## 2020-04-01 DIAGNOSIS — E78.5 HYPERLIPIDEMIA LDL GOAL <100: ICD-10-CM

## 2020-04-01 DIAGNOSIS — I10 ESSENTIAL HYPERTENSION, BENIGN: ICD-10-CM

## 2020-04-01 DIAGNOSIS — E10.9 TYPE 1 DIABETES MELLITUS WITHOUT COMPLICATION (H): ICD-10-CM

## 2020-04-01 PROCEDURE — 99214 OFFICE O/P EST MOD 30 MIN: CPT | Mod: TEL | Performed by: CLINICAL NURSE SPECIALIST

## 2020-04-01 NOTE — PROGRESS NOTES
"Subjective     Aldo Sharpe is a 50 year old male who is being evaluated via a billable telephone visit.      The patient has been notified of following:     \"This telephone visit will be conducted via a call between you and your physician/provider. We have found that certain health care needs can be provided without the need for a physical exam.  This service lets us provide the care you need with a short phone conversation.  If a prescription is necessary we can send it directly to your pharmacy.  If lab work is needed we can place an order for that and you can then stop by our lab to have the test done at a later time.    If during the course of the call the physician/provider feels a telephone visit is not appropriate, you will not be charged for this service.\"     Aldo Sharpe complains of type 1 diabetes.    Endocrinology Clinic Note:  Name: Aldo Sharpe  F/u for Diabetes (Last seen 10/9/2019).  HPI:  Aldo Sharpe is a 50 year old male being evaluated via phone visit for type 1 diabetes.  Previously saw Dr. Yung.      1. Type 1 DM:  Orginally diagnosed at the age of: 15- years old  Hospitalization for DKA: none  Current Regimen: Medtronic 670 G.  Started pump therapy 1/2019.    Current Regimen:   Insulin pump - Auto mode 95%  Humalog + Metformin XR 1000 mg bid.  Using [employer] meter - strips provided free of charge by insurance.  Time Rate (U/hr)   0000 1.90                 Carbohydrate Ratio -    Time Ratio   0000 4         Sensitivity   20   Active Insulin Time  3:00 hours   Basal     Bolus      Total Carbohydrates/day     Total Insulin/day     Average Blood Sugar  Average sensor glucose  BG range          BS Checks  9 times a day   Care Link Username-   Password-   Changes set q 2.-3 days.  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  Fixed meal pattern: yes  Patient counting carbs: " sometimes    DM Complications:   Complications:   Diabetes Complications  Description / Detail    Diabetic Retinopathy  No retinopathy    CAD / PAD  No   Neuropathy  No   Nephropathy / Microalbuminuria  No   Gastroparesis  No   Hypoglycemia Unawarness  No     Erectile dysfunction secondary to diabetes.  Started experiencing difficulties obtaining and maintaining erections.  Was given a prescription of Viagra but insurance denied coverage - denial letter states his Rx benefit plan [employer] not cover the requested medication.  Has never had the opportunity to try Viagra.  2. Hypertension: Blood Pressure:   BP Readings from Last 3 Encounters:   10/09/19 125/77   06/20/19 134/80   03/20/19 136/73   On medications - Amlodipine 5 mg daily, Lisinopril 20 mg bid .  3. Hyperlipidemia:  On medication - Simvastatin 40 mg daily.  PMH/PSH:  Past Medical History:   Diagnosis Date     Diabetic eye exam (H) 03/18/14     Hyperlipidemia LDL goal <100 10/31/2010     Type 1 diabetes, HbA1c goal < 7% (H) 10/31/2010     No past surgical history on file.  Family Hx:  No family history on file.        DM2:           Social Hx:  Social History     Socioeconomic History     Marital status:      Spouse name: Not on file     Number of children: Not on file     Years of education: Not on file     Highest education level: Not on file   Occupational History     Not on file   Social Needs     Financial resource strain: Not on file     Food insecurity     Worry: Not on file     Inability: Not on file     Transportation needs     Medical: Not on file     Non-medical: Not on file   Tobacco Use     Smoking status: Never Smoker     Smokeless tobacco: Never Used   Substance and Sexual Activity     Alcohol use: No     Comment: none     Drug use: No     Sexual activity: Yes     Partners: Female   Lifestyle     Physical activity     Days per week: Not on file     Minutes per session: Not on file     Stress: Not on file   Relationships     Social  connections     Talks on phone: Not on file     Gets together: Not on file     Attends Yazdanism service: Not on file     Active member of club or organization: Not on file     Attends meetings of clubs or organizations: Not on file     Relationship status: Not on file     Intimate partner violence     Fear of current or ex partner: Not on file     Emotionally abused: Not on file     Physically abused: Not on file     Forced sexual activity: Not on file   Other Topics Concern     Parent/sibling w/ CABG, MI or angioplasty before 65F 55M? Not Asked   Social History Narrative     Not on file          MEDICATIONS:  has a current medication list which includes the following prescription(s): acetone urine, amlodipine, aspirin, b-d u/f, blood glucose, fish oil, glucagon, insulin aspart, insulin lispro, lisinopril, metformin, multivitamin w/minerals, omega 3, sildenafil, sildenafil, and simvastatin.     ALLERGIES  Patient has no known allergies.    ROS     ROS: 10 point ROS neg other than the symptoms noted above in the HPI.    Objective   Reported vitals:  There were no vitals taken for this visit.   Psych: Alert and oriented times 3; coherent speech, normal   rate and volume, able to articulate logical thoughts, able   to abstract reason, no tangential thoughts, no hallucinations   or delusions  His affect is pleasant and cooperative.    LABS:  A1c:  Component      Latest Ref Rng & Units 3/20/2019 6/20/2019 10/9/2019   Hemoglobin A1C      0 - 5.6 % 7.0 (H) 6.8 (H) 6.9 (H)     BMP:  !COMPREHENSIVE Latest Ref Rng & Units 6/20/2019   SODIUM 133 - 144 mmol/L 141   POTASSIUM 3.4 - 5.3 mmol/L 4.5   CHLORIDE 94 - 109 mmol/L 107   BUN 7 - 30 mg/dL 17   Creatinine 0.66 - 1.25 mg/dL 0.87   Glucose 70 - 99 mg/dL 134 (H)   ANION GAP 3 - 14 mmol/L 9   CALCIUM 8.5 - 10.1 mg/dL 8.6   ALBUMIN 3.4 - 5.0 g/dL 3.6     Urine Micro:  Component      Latest Ref Rng & Units 7/10/2018   Creatinine Urine      mg/dL 154   Albumin Urine mg/L       mg/L 9   Albumin Urine mg/g Cr      0 - 17 mg/g Cr 6.10     LFTs/Lipids:  !LIPID/HEPATIC Latest Ref Rng & Units 6/20/2019   CHOLESTEROL <200 mg/dL 124   TRIGLYCERIDES <150 mg/dL 74   HDL CHOLESTEROL >39 mg/dL 39 (L)   LDL CHOLESTEROL, CALCULATED <100 mg/dL 70   VLDL-CHOLESTEROL 0 - 30 mg/dL    NON HDL CHOLESTEROL <130 mg/dL 85   CHOLESTEROL/HDL RATIO 0.0 - 5.0    AST 0 - 45 U/L 15   ALT 0 - 70 U/L 27     TFTs:  !THYROID Latest Ref Rng & Units 6/20/2019   TSH 0.40 - 4.00 mU/L 3.21       Blood Glucose and pump data/ Meter reviewed.     All pertinent notes, labs, and images personally reviewed by me.     A/P:    1. DM1 - Controlled. No known complications from DM.  Historically blood glucose has been under good control.  No pump setting changes at this time.  Continue Metformin 1000 mg bid.  Rx for glucagon emergency kit and urine ketone strips provided today.    He has Lantus at home for use if pump fails.    Recommend checking blood sugars as required for calibration of his CGM.    If Blood glucose are low more often-> 2-3 times/week- give us a call.  The patient is advised to continue making good food choices: reduce carbs, Reduce portion size, weight loss and exercise 3-4 times a week.  Discussed hypoglycemia signs and symptoms as well as management in detail.    2. Hypertension - Historically ontrolled    3. Hyperlipidemia - On statin therapy.  4. Prevention  Flu Shot- 9/8/2019 - received at [facility]  Pneumovax- Conj 13-V: 12/2015; 23 valent: 12/2012.  Opthalmology- annually (wife works as )  ASA- 81 mg  Smoking- no    Follow-up:  follow up in 3-4 month(s)    Lisette Dyer NP  Endocrinology  [facility]  CC: Kaleb Rosenthal    Phone call duration:  26 minutes.  Call start time: 4:30 pm.  Call end time: 4:56 pm.

## 2020-04-06 RX ORDER — IBUPROFEN 600 MG/1
1 TABLET ORAL ONCE
Qty: 1 EACH | Refills: 0 | Status: SHIPPED | OUTPATIENT
Start: 2020-04-06 | End: 2020-04-06

## 2020-04-07 ENCOUNTER — TELEPHONE (OUTPATIENT)
Dept: ENDOCRINOLOGY | Facility: CLINIC | Age: 50
End: 2020-04-07

## 2020-04-07 DIAGNOSIS — E10.9 TYPE 1 DIABETES MELLITUS WITHOUT COMPLICATION (H): Primary | ICD-10-CM

## 2020-04-07 NOTE — TELEPHONE ENCOUNTER
[facility] calling.    Humalog no longer covered.  Will need to do a PA for the rx or put in a new rx for    Novalog or Fiasp    fax # [phone]    Phone # for PA   - [phone]    Phone number for [facility] if just want to change medication    [phone]    Ref # [id number]

## 2020-04-23 ENCOUNTER — MYC REFILL (OUTPATIENT)
Dept: ENDOCRINOLOGY | Facility: CLINIC | Age: 50
End: 2020-04-23

## 2020-04-23 DIAGNOSIS — E10.9 TYPE 1 DIABETES MELLITUS WITHOUT COMPLICATION (H): ICD-10-CM

## 2020-04-23 DIAGNOSIS — I10 ESSENTIAL HYPERTENSION, BENIGN: ICD-10-CM

## 2020-04-24 RX ORDER — LISINOPRIL 20 MG/1
20 TABLET ORAL 2 TIMES DAILY
Qty: 180 TABLET | Refills: 3 | OUTPATIENT
Start: 2020-04-24

## 2020-05-27 DIAGNOSIS — I10 ESSENTIAL HYPERTENSION, BENIGN: ICD-10-CM

## 2020-05-27 NOTE — TELEPHONE ENCOUNTER
Routing refill request to provider for review/approval because:  Medication was issued by endo previously, routing to PCP to review refill request.     Cindy Gomez, RN   [facility] -- Triage Nurse

## 2020-05-28 ENCOUNTER — MYC REFILL (OUTPATIENT)
Dept: ENDOCRINOLOGY | Facility: CLINIC | Age: 50
End: 2020-05-28

## 2020-05-28 DIAGNOSIS — I10 ESSENTIAL HYPERTENSION, BENIGN: ICD-10-CM

## 2020-05-28 RX ORDER — AMLODIPINE BESYLATE 5 MG/1
TABLET ORAL
Qty: 90 TABLET | Refills: 1 | Status: SHIPPED | OUTPATIENT
Start: 2020-05-28 | End: 2020-11-17

## 2020-06-01 RX ORDER — AMLODIPINE BESYLATE 5 MG/1
TABLET ORAL
Qty: 90 TABLET | Refills: 1 | OUTPATIENT
Start: 2020-06-01

## 2020-07-30 ENCOUNTER — VIRTUAL VISIT (OUTPATIENT)
Dept: ENDOCRINOLOGY | Facility: CLINIC | Age: 50
End: 2020-07-30
Payer: COMMERCIAL

## 2020-07-30 DIAGNOSIS — E10.9 TYPE 1 DIABETES MELLITUS WITHOUT COMPLICATION (H): Primary | ICD-10-CM

## 2020-07-30 DIAGNOSIS — N52.9 ERECTILE DYSFUNCTION, UNSPECIFIED ERECTILE DYSFUNCTION TYPE: ICD-10-CM

## 2020-07-30 DIAGNOSIS — E78.5 HYPERLIPIDEMIA LDL GOAL <100: ICD-10-CM

## 2020-07-30 DIAGNOSIS — Z96.41 INSULIN PUMP IN PLACE: ICD-10-CM

## 2020-07-30 PROCEDURE — 99213 OFFICE O/P EST LOW 20 MIN: CPT | Mod: 95 | Performed by: CLINICAL NURSE SPECIALIST

## 2020-07-30 RX ORDER — SILDENAFIL 100 MG/1
100 TABLET, FILM COATED ORAL DAILY PRN
Qty: 18 TABLET | Refills: 11 | Status: SHIPPED | OUTPATIENT
Start: 2020-07-30 | End: 2021-08-13

## 2020-07-30 NOTE — LETTER
"    7/30/2020         RE: Aldo Sharpe  [address] [place] [id number]        Dear Colleague,    Thank you for referring your patient, Aldo Sharpe, to the [facility]. Please see a copy of my visit note below.    Aldo Sharpe is a 50 year old male who is being evaluated via a billable telephone visit due to the COVID-19 pandemic.      The patient has been notified of following:     \"This telephone visit will be conducted via a call between you and your physician/provider. We have found that certain health care needs can be provided without the need for a physical exam.  This service lets us provide the care you need with a short phone conversation.  If a prescription is necessary we can send it directly to your pharmacy.  If lab work is needed we can place an order for that and you can then stop by our lab to have the test done at a later time.    Telephone visits are billed at different rates depending on your insurance coverage. During this emergency period, for some insurers they may be billed the same as an in-person visit.  Please reach out to your insurance provider with any questions.    If during the course of the call the physician/provider feels a telephone visit is not appropriate, you will not be charged for this service.\"    Patient has given verbal consent for Telephone visit?  Yes    What phone number would you like to be contacted at? [phone]    How would you like to obtain your AVS? [facility]      Endocrinology Clinic Note:  Name: Aldo Sharpe  F/u for Diabetes (Last seen 4/1/2020).  HPI:  Aldo Sharpe is a 50 year old male being evaluated via phone visit for type 1 diabetes.  Previously saw Dr. Yung.      1. Type 1 DM:  Orginally diagnosed at the age of: 15- years old  Hospitalization for DKA: none  Current Regimen: Medtronic 670 G.  Started pump therapy 1/2019.    Current Regimen:   Insulin pump 670G - Auto mode 94%  Sensor wear 93%  Reservoir change " q 2.6 days  Humalog + Metformin XR 1000 mg bid.  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  Fixed meal pattern: yes  Patient counting carbs: sometimes    DM Complications:   Complications:   Diabetes Complications  Description / Detail    Diabetic Retinopathy  No retinopathy    CAD / PAD  No   Neuropathy  No   Nephropathy / Microalbuminuria  No   Gastroparesis  No   Hypoglycemia Unawarness  No     Erectile dysfunction secondary to diabetes.  Started experiencing difficulties obtaining and maintaining erections.  Was given a prescription of Viagra but insurance denied coverage - denial letter states his Rx benefit plan [employer] not cover the requested medication.  Treated with Viagra - pays out of pocket - is affordable through [facility].  2. Hypertension: Blood Pressure:   BP Readings from Last 3 Encounters:   10/09/19 125/77   06/20/19 134/80   03/20/19 136/73   On medications - Amlodipine 5 mg daily, Lisinopril 20 mg bid .  3. Hyperlipidemia:  On medication - Simvastatin 40 mg daily.  PMH/PSH:  Past Medical History:   Diagnosis Date     Diabetic eye exam (H) 03/18/14     Hyperlipidemia LDL goal <100 10/31/2010     Type 1 diabetes, HbA1c goal < 7% (H) 10/31/2010     History reviewed. No pertinent surgical history.  Family Hx:  History reviewed. No pertinent family history.        DM2:           Social Hx:  Social History     Socioeconomic History     Marital status:      Spouse name: Not on file     Number of children: Not  on file     Years of education: Not on file     Highest education level: Not on file   Occupational History     Not on file   Social Needs     Financial resource strain: Not on file     Food insecurity     Worry: Not on file     Inability: Not on file     Transportation needs     Medical: Not on file     Non-medical: Not on file   Tobacco Use     Smoking status: Never Smoker     Smokeless tobacco: Never Used   Substance and Sexual Activity     Alcohol use: No     Comment: none     Drug use: No     Sexual activity: Yes     Partners: Female   Lifestyle     Physical activity     Days per week: Not on file     Minutes per session: Not on file     Stress: Not on file   Relationships     Social connections     Talks on phone: Not on file     Gets together: Not on file     Attends Orthodox service: Not on file     Active member of club or organization: Not on file     Attends meetings of clubs or organizations: Not on file     Relationship status: Not on file     Intimate partner violence     Fear of current or ex partner: Not on file     Emotionally abused: Not on file     Physically abused: Not on file     Forced sexual activity: Not on file   Other Topics Concern     Parent/sibling w/ CABG, MI or angioplasty before 65F 55M? Not Asked   Social History Narrative     Not on file          MEDICATIONS:  has a current medication list which includes the following prescription(s): sildenafil, acetone urine, amlodipine, aspirin, b-d u/f, blood glucose, fish oil, glucagon emergency, insulin aspart, insulin aspart, lisinopril, metformin, multivitamin w/minerals, omega 3, sildenafil, and simvastatin.     ALLERGIES  Patient has no known allergies.    ROS     ROS: 10 point ROS neg other than the symptoms noted above in the HPI.    Objective   Reported vitals:  There were no vitals taken for this visit.   Psych: Alert and oriented times 3; coherent speech, normal   rate and volume, able to articulate logical thoughts, able   to  abstract reason, no tangential thoughts, no hallucinations   or delusions  His affect is pleasant and cooperative.    LABS:  A1c:  Component      Latest Ref Rng & Units 10/9/2019 1/7/2020   Hemoglobin A1C      0 - 5.6 % 6.9 (H) 6.6 (H)     BMP:  !COMPREHENSIVE Latest Ref Rng & Units 6/20/2019   SODIUM 133 - 144 mmol/L 141   POTASSIUM 3.4 - 5.3 mmol/L 4.5   CHLORIDE 94 - 109 mmol/L 107   BUN 7 - 30 mg/dL 17   Creatinine 0.66 - 1.25 mg/dL 0.87   Glucose 70 - 99 mg/dL 134 (H)   ANION GAP 3 - 14 mmol/L 9   CALCIUM 8.5 - 10.1 mg/dL 8.6   ALBUMIN 3.4 - 5.0 g/dL 3.6     Urine Micro:  Component      Latest Ref Rng & Units 10/9/2019   Creatinine Urine      mg/dL 55   Albumin Urine mg/L      mg/L 5   Albumin Urine mg/g Cr      0 - 17 mg/g Cr 9.03     LFTs/Lipids:  !LIPID/HEPATIC Latest Ref Rng & Units 6/20/2019   CHOLESTEROL <200 mg/dL 124   TRIGLYCERIDES <150 mg/dL 74   HDL CHOLESTEROL >39 mg/dL 39 (L)   LDL CHOLESTEROL, CALCULATED <100 mg/dL 70   VLDL-CHOLESTEROL 0 - 30 mg/dL    NON HDL CHOLESTEROL <130 mg/dL 85   CHOLESTEROL/HDL RATIO 0.0 - 5.0    AST 0 - 45 U/L 15   ALT 0 - 70 U/L 27     TFTs:  !THYROID Latest Ref Rng & Units 6/20/2019   TSH 0.40 - 4.00 mU/L 3.21       Blood Glucose and pump data/ Meter reviewed.     All pertinent notes, labs, and images personally reviewed by me.     A/P:    1. DM1 - Controlled. No known complications from DM.  Historically blood glucose has been under good control.  No pump setting changes at this time.  Continue Metformin 1000 mg bid.  Rx for glucagon emergency kit and urine ketone strips provided 4/2020.    He has Lantus at home for use if pump fails.  He'll schedule a lab only appointment for fasting lipid panel, A1c, TSH, and CMP  If Blood glucose are low > 2-3 times/week- give us a call    2. Erectile dysfunction.  Viagra Rx renewed.    3. Hyperlipidemia - On statin therapy.  4. Prevention  Flu Shot- 9/8/2019 - received at [facility]  Pneumovax- Conj 13-V: 12/2015; 23 valent:  12/2012.  Opthalmology- annually (wife works as )  ASA- 81 mg  Smoking- no    Follow-up:  follow up in 3-4 month(s)    Lisette Dyer NP  Endocrinology  [facility]  CC:     Phone call duration:  15 minutes.  Call start time: 10:07 am.  Call end time: 10:22 am         Again, thank you for allowing me to participate in the care of your patient.        Sincerely,        ALEXIA Dyer CNP

## 2020-07-30 NOTE — PROGRESS NOTES
"Aldo Sharpe is a 50 year old male who is being evaluated via a billable telephone visit due to the COVID-19 pandemic.      The patient has been notified of following:     \"This telephone visit will be conducted via a call between you and your physician/provider. We have found that certain health care needs can be provided without the need for a physical exam.  This service lets us provide the care you need with a short phone conversation.  If a prescription is necessary we can send it directly to your pharmacy.  If lab work is needed we can place an order for that and you can then stop by our lab to have the test done at a later time.    Telephone visits are billed at different rates depending on your insurance coverage. During this emergency period, for some insurers they may be billed the same as an in-person visit.  Please reach out to your insurance provider with any questions.    If during the course of the call the physician/provider feels a telephone visit is not appropriate, you will not be charged for this service.\"    Patient has given verbal consent for Telephone visit?  Yes    What phone number would you like to be contacted at? [phone]    How would you like to obtain your AVS? [facility]      Endocrinology Clinic Note:  Name: Aldo Sharpe  F/u for Diabetes (Last seen 4/1/2020).  HPI:  Aldo Sharpe is a 50 year old male being evaluated via phone visit for type 1 diabetes.  Previously saw Dr. Yung.      1. Type 1 DM:  Orginally diagnosed at the age of: 15- years old  Hospitalization for DKA: none  Current Regimen: Medtronic 670 G.  Started pump therapy 1/2019.    Current Regimen:   Insulin pump 670G - Auto mode 94%  Sensor wear 93%  Reservoir change q 2.6 days  Humalog + Metformin XR 1000 mg bid.  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  Fixed meal pattern: yes  Patient counting carbs: sometimes    DM Complications:   Complications:   Diabetes Complications  Description / Detail    Diabetic Retinopathy  No retinopathy    CAD / PAD  No   Neuropathy  No   Nephropathy / Microalbuminuria  No   Gastroparesis  No   Hypoglycemia Unawarness  No     Erectile dysfunction secondary to diabetes.  Started experiencing difficulties obtaining and maintaining erections.  Was given a prescription of Viagra but insurance denied coverage - denial letter states his Rx benefit plan davidson not cover the requested medication.  Treated with Viagra - pays out of pocket - is affordable through [facility] pharmacy.  2. Hypertension: Blood Pressure:   BP Readings from Last 3 Encounters:   10/09/19 125/77   06/20/19 134/80   03/20/19 136/73   On medications - Amlodipine 5 mg daily, Lisinopril 20 mg bid .  3. Hyperlipidemia:  On medication - Simvastatin 40 mg daily.  PMH/PSH:  Past Medical History:   Diagnosis Date     Diabetic eye exam (H) 03/18/14     Hyperlipidemia LDL goal <100 10/31/2010     Type 1 diabetes, HbA1c goal < 7% (H) 10/31/2010     History reviewed. No pertinent surgical history.  Family Hx:  History reviewed. No pertinent family history.        DM2:           Social Hx:  Social History     Socioeconomic History     Marital status:      Spouse name: Not on file     Number of children: Not on file     Years of education: Not on file     Highest education level: Not on file   Occupational History     Not on file   Social Needs     Financial resource strain: Not on file     Food insecurity     Worry: Not on file     Inability:  Not on file     Transportation needs     Medical: Not on file     Non-medical: Not on file   Tobacco Use     Smoking status: Never Smoker     Smokeless tobacco: Never Used   Substance and Sexual Activity     Alcohol use: No     Comment: none     Drug use: No     Sexual activity: Yes     Partners: Female   Lifestyle     Physical activity     Days per week: Not on file     Minutes per session: Not on file     Stress: Not on file   Relationships     Social connections     Talks on phone: Not on file     Gets together: Not on file     Attends Scientology service: Not on file     Active member of club or organization: Not on file     Attends meetings of clubs or organizations: Not on file     Relationship status: Not on file     Intimate partner violence     Fear of current or ex partner: Not on file     Emotionally abused: Not on file     Physically abused: Not on file     Forced sexual activity: Not on file   Other Topics Concern     Parent/sibling w/ CABG, MI or angioplasty before 65F 55M? Not Asked   Social History Narrative     Not on file          MEDICATIONS:  has a current medication list which includes the following prescription(s): sildenafil, acetone urine, amlodipine, aspirin, b-d u/f, blood glucose, fish oil, glucagon emergency, insulin aspart, insulin aspart, lisinopril, metformin, multivitamin w/minerals, omega 3, sildenafil, and simvastatin.     ALLERGIES  Patient has no known allergies.    ROS     ROS: 10 point ROS neg other than the symptoms noted above in the HPI.    Objective   Reported vitals:  There were no vitals taken for this visit.   Psych: Alert and oriented times 3; coherent speech, normal   rate and volume, able to articulate logical thoughts, able   to abstract reason, no tangential thoughts, no hallucinations   or delusions  His affect is pleasant and cooperative.    LABS:  A1c:  Component      Latest Ref Rng & Units 10/9/2019 1/7/2020   Hemoglobin A1C      0 - 5.6 % 6.9 (H) 6.6 (H)      BMP:  !COMPREHENSIVE Latest Ref Rng & Units 6/20/2019   SODIUM 133 - 144 mmol/L 141   POTASSIUM 3.4 - 5.3 mmol/L 4.5   CHLORIDE 94 - 109 mmol/L 107   BUN 7 - 30 mg/dL 17   Creatinine 0.66 - 1.25 mg/dL 0.87   Glucose 70 - 99 mg/dL 134 (H)   ANION GAP 3 - 14 mmol/L 9   CALCIUM 8.5 - 10.1 mg/dL 8.6   ALBUMIN 3.4 - 5.0 g/dL 3.6     Urine Micro:  Component      Latest Ref Rng & Units 10/9/2019   Creatinine Urine      mg/dL 55   Albumin Urine mg/L      mg/L 5   Albumin Urine mg/g Cr      0 - 17 mg/g Cr 9.03     LFTs/Lipids:  !LIPID/HEPATIC Latest Ref Rng & Units 6/20/2019   CHOLESTEROL <200 mg/dL 124   TRIGLYCERIDES <150 mg/dL 74   HDL CHOLESTEROL >39 mg/dL 39 (L)   LDL CHOLESTEROL, CALCULATED <100 mg/dL 70   VLDL-CHOLESTEROL 0 - 30 mg/dL    NON HDL CHOLESTEROL <130 mg/dL 85   CHOLESTEROL/HDL RATIO 0.0 - 5.0    AST 0 - 45 U/L 15   ALT 0 - 70 U/L 27     TFTs:  !THYROID Latest Ref Rng & Units 6/20/2019   TSH 0.40 - 4.00 mU/L 3.21       Blood Glucose and pump data/ Meter reviewed.     All pertinent notes, labs, and images personally reviewed by me.     A/P:    1. DM1 - Controlled. No known complications from DM.  Historically blood glucose has been under good control.  No pump setting changes at this time.  Continue Metformin 1000 mg bid.  Rx for glucagon emergency kit and urine ketone strips provided 4/2020.    He has Lantus at home for use if pump fails.  He'll schedule a lab only appointment for fasting lipid panel, A1c, TSH, and CMP  If Blood glucose are low > 2-3 times/week- give us a call    2. Erectile dysfunction.  Viagra Rx renewed.    3. Hyperlipidemia - On statin therapy.  4. Prevention  Flu Shot- 9/8/2019 - received at [facility]  Pneumovax- Conj 13-V: 12/2015; 23 valent: 12/2012.  Opthalmology- annually (wife works as )  ASA- 81 mg  Smoking- no    Follow-up:  follow up in 3-4 month(s)    Lisette Dyer NP  Endocrinology  [facility]  CC:     Phone call duration:  15 minutes.  Call start  time: 10:07 am.  Call end time: 10:22 am

## 2020-08-20 ENCOUNTER — MYC MEDICAL ADVICE (OUTPATIENT)
Dept: ENDOCRINOLOGY | Facility: CLINIC | Age: 50
End: 2020-08-20

## 2020-08-20 NOTE — LETTER
[facility]  [address]  [facility] [address]  Phone: [phone]  Fax: [phone]    August 21, 2020        Aldo Sharpe  [address] [place] [id number]          To whom it may concern:    RE: Aldo Carlson is a patient under my care for diabetes.  Patient's with diabetes are at increased risk of serious complications of COVID-19.  It is my understanding that a coworker of Mr. Sharpe has recently tested positive for COVID-19 and that Mr. Sharpe has been in close, daily contact with this coworker.  Please allow Mr. Sharpe to self-quarantine and be off work for the next two weeks.       Please contact me for questions or concerns.      Sincerely,        ALEXIA Dyer CNP

## 2020-08-21 NOTE — TELEPHONE ENCOUNTER
Letter/note provided.  Please call and see where Aldo would like the letter sent or faxed.  Lisette Dyer NP  Endocrinology

## 2020-08-21 NOTE — TELEPHONE ENCOUNTER
Spoke with patient.  Patient printed letter off of [employer] but also requested that the printed letter the provider signed by mailed to him.        Signed letter mailed to the patient.  Sharlene Gallegos CMA on 8/21/2020 at 2:19 PM

## 2020-08-26 DIAGNOSIS — E10.9 TYPE 1 DIABETES MELLITUS WITHOUT COMPLICATION (H): ICD-10-CM

## 2020-08-26 LAB
ALBUMIN SERPL-MCNC: 3.6 G/DL (ref 3.4–5)
ALP SERPL-CCNC: 62 U/L (ref 40–150)
ALT SERPL W P-5'-P-CCNC: 34 U/L (ref 0–70)
ANION GAP SERPL CALCULATED.3IONS-SCNC: 5 MMOL/L (ref 3–14)
AST SERPL W P-5'-P-CCNC: 14 U/L (ref 0–45)
BILIRUB SERPL-MCNC: 0.3 MG/DL (ref 0.2–1.3)
BUN SERPL-MCNC: 19 MG/DL (ref 7–30)
CALCIUM SERPL-MCNC: 9.3 MG/DL (ref 8.5–10.1)
CHLORIDE SERPL-SCNC: 106 MMOL/L (ref 94–109)
CHOLEST SERPL-MCNC: 129 MG/DL
CO2 SERPL-SCNC: 27 MMOL/L (ref 20–32)
CREAT SERPL-MCNC: 0.94 MG/DL (ref 0.66–1.25)
GFR SERPL CREATININE-BSD FRML MDRD: >90 ML/MIN/{1.73_M2}
GLUCOSE SERPL-MCNC: 122 MG/DL (ref 70–99)
HBA1C MFR BLD: 6.7 % (ref 0–5.6)
HDLC SERPL-MCNC: 38 MG/DL
LDLC SERPL CALC-MCNC: 67 MG/DL
NONHDLC SERPL-MCNC: 91 MG/DL
POTASSIUM SERPL-SCNC: 4.1 MMOL/L (ref 3.4–5.3)
PROT SERPL-MCNC: 7.4 G/DL (ref 6.8–8.8)
SODIUM SERPL-SCNC: 138 MMOL/L (ref 133–144)
T4 FREE SERPL-MCNC: 1.08 NG/DL (ref 0.76–1.46)
TRIGL SERPL-MCNC: 122 MG/DL
TSH SERPL DL<=0.005 MIU/L-ACNC: 5.46 MU/L (ref 0.4–4)

## 2020-08-26 PROCEDURE — 83036 HEMOGLOBIN GLYCOSYLATED A1C: CPT | Performed by: CLINICAL NURSE SPECIALIST

## 2020-08-26 PROCEDURE — 84443 ASSAY THYROID STIM HORMONE: CPT | Performed by: CLINICAL NURSE SPECIALIST

## 2020-08-26 PROCEDURE — 36415 COLL VENOUS BLD VENIPUNCTURE: CPT | Performed by: CLINICAL NURSE SPECIALIST

## 2020-08-26 PROCEDURE — 84439 ASSAY OF FREE THYROXINE: CPT | Performed by: CLINICAL NURSE SPECIALIST

## 2020-08-26 PROCEDURE — 80053 COMPREHEN METABOLIC PANEL: CPT | Performed by: CLINICAL NURSE SPECIALIST

## 2020-08-26 PROCEDURE — 80061 LIPID PANEL: CPT | Performed by: CLINICAL NURSE SPECIALIST

## 2020-08-27 NOTE — RESULT ENCOUNTER NOTE
Aldo,  Your A1c looks great! It is stable and controlled.  Your cholesterol numbers look good.  Your liver and kidney function are normal.  Your thyroid test was slightly elevated.  This does not require any treatment at this time but we will plan to recheck it again in another 3 months when you have your next A1c tested.    We'll plan to repeat the labs in early December at your next scheduled visit.  Here's a copy of the lab results for your records.  Lisette Dyer NP  Endocrinology

## 2020-10-06 DIAGNOSIS — E10.9 TYPE 1 DIABETES MELLITUS WITHOUT COMPLICATION (H): ICD-10-CM

## 2020-10-07 NOTE — TELEPHONE ENCOUNTER
Prescription approved per [facility] Refill Protocol.    Deanne KEANE RN, BSN

## 2020-11-16 DIAGNOSIS — I10 ESSENTIAL HYPERTENSION, BENIGN: ICD-10-CM

## 2020-11-16 NOTE — TELEPHONE ENCOUNTER
Routing refill request to provider for review/approval because:  BP not current.     Visit is up to date. Ok for a BP check or do you want a full office visit?    Cindy Gomez RN   [facility] -- Triage Nurse

## 2020-11-17 RX ORDER — AMLODIPINE BESYLATE 5 MG/1
TABLET ORAL
Qty: 90 TABLET | Refills: 0 | Status: SHIPPED | OUTPATIENT
Start: 2020-11-17 | End: 2020-12-03

## 2020-11-23 ENCOUNTER — MYC REFILL (OUTPATIENT)
Dept: ENDOCRINOLOGY | Facility: CLINIC | Age: 50
End: 2020-11-23

## 2020-11-23 DIAGNOSIS — E10.9 TYPE 1 DIABETES MELLITUS WITHOUT COMPLICATION (H): ICD-10-CM

## 2020-11-25 NOTE — TELEPHONE ENCOUNTER
Medication is being filled for 1 time refill only due to:  Future appointment scheduled     Virtual Visit scheduled for 12/3/20 with Lisette Dyer NP.

## 2020-12-02 ENCOUNTER — TELEPHONE (OUTPATIENT)
Dept: ENDOCRINOLOGY | Facility: CLINIC | Age: 50
End: 2020-12-02

## 2020-12-02 NOTE — TELEPHONE ENCOUNTER
Patient calling today to give phone number to call to get prior auth for test strips to go with Contour Next meter per his insurance company.      phone #[phone] to call to get this started.     These are the test strips that work with his insulin pump.    Sofia Means MA

## 2020-12-03 ENCOUNTER — VIRTUAL VISIT (OUTPATIENT)
Dept: ENDOCRINOLOGY | Facility: CLINIC | Age: 50
End: 2020-12-03
Payer: COMMERCIAL

## 2020-12-03 ENCOUNTER — MEDICAL CORRESPONDENCE (OUTPATIENT)
Dept: HEALTH INFORMATION MANAGEMENT | Facility: CLINIC | Age: 50
End: 2020-12-03

## 2020-12-03 DIAGNOSIS — E10.9 TYPE 1 DIABETES MELLITUS WITHOUT COMPLICATION (H): ICD-10-CM

## 2020-12-03 DIAGNOSIS — I10 ESSENTIAL HYPERTENSION, BENIGN: Primary | ICD-10-CM

## 2020-12-03 DIAGNOSIS — R79.89 ELEVATED TSH: ICD-10-CM

## 2020-12-03 PROCEDURE — 99214 OFFICE O/P EST MOD 30 MIN: CPT | Mod: 95 | Performed by: CLINICAL NURSE SPECIALIST

## 2020-12-03 RX ORDER — AMLODIPINE BESYLATE 10 MG/1
10 TABLET ORAL DAILY
Qty: 90 TABLET | Refills: 3 | Status: SHIPPED | OUTPATIENT
Start: 2020-12-03 | End: 2021-10-29

## 2020-12-03 NOTE — TELEPHONE ENCOUNTER
Noted, see visit today with OMAYRA antoine approved for test strips, MOHINDER to   Rosalina Wright RN, BSN  Message handled by CLINIC NURSE.

## 2020-12-03 NOTE — PROGRESS NOTES
"Aldo Sharpe is a 50 year old male who is being evaluated via a billable telephone visit.      The patient has been notified of following:     \"This telephone visit will be conducted via a call between you and your physician/provider. We have found that certain health care needs can be provided without the need for a physical exam.  This service lets us provide the care you need with a short phone conversation.  If a prescription is necessary we can send it directly to your pharmacy.  If lab work is needed we can place an order for that and you can then stop by our lab to have the test done at a later time.    Telephone visits are billed at different rates depending on your insurance coverage. During this emergency period, for some insurers they may be billed the same as an in-person visit.  Please reach out to your insurance provider with any questions.    If during the course of the call the physician/provider feels a telephone visit is not appropriate, you will not be charged for this service.\"    Patient has given verbal consent for Telephone visit?  Yes    What phone number would you like to be contacted at?     How would you like to obtain your AVS? [facility]    Endocrinology Clinic Note:  Name: Aldo Sharpe  F/u for Diabetes (Last seen 7/30/2020).  HPI:  Aldo Sharpe is a 50 year old male being evaluated via phone visit for type 1 diabetes.  Previously saw Dr. Yung.      1. Type 1 DM:  Orginally diagnosed at the age of: 15 years old  Hospitalization for DKA: none  Current Regimen: Medtronic 670 G.  Started pump therapy 1/2019.    Current Regimen:   Insulin pump 670G - Auto mode 96%  Sensor wear 95%  Reservoir change q 2.6 days  Novolog + Metformin XR 1000 mg bid for tx of insulin resistance.    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  Fixed meal pattern: yes  Patient counting carbs: sometimes    DM Complications:   Complications:   Diabetes Complications  Description / Detail    Diabetic Retinopathy  No retinopathy    CAD / PAD  No   Neuropathy  No   Nephropathy / Microalbuminuria  No   Gastroparesis  No   Hypoglycemia Unawarness  No     Erectile dysfunction secondary to diabetes.  Started experiencing difficulties obtaining and maintaining erections.  Was given a prescription of Viagra but insurance denied coverage - denial letter states his Rx benefit plan davidson not cover the requested medication.  Treated with Viagra - pays out of pocket - is affordable through [facility] pharmacy.  2. Hypertension: Blood Pressure:   BP Readings from Last 3 Encounters:   10/09/19 125/77   06/20/19 134/80   03/20/19 136/73   On medications - Amlodipine 5 mg daily, Lisinopril 20 mg bid .  3. Hyperlipidemia:  On medication - Simvastatin 40 mg daily.  PMH/PSH:  Past Medical History:   Diagnosis Date     Diabetic eye exam (H) 03/18/14     Hyperlipidemia LDL goal <100 10/31/2010     Type 1 diabetes, HbA1c goal < 7% (H) 10/31/2010     No past surgical history on file.  Family Hx:  No family history on file.        DM2:           Social Hx:  Social History     Socioeconomic History     Marital status:      Spouse name: Not on file     Number of children: Not on file     Years of education: Not on file     Highest education level: Not on file   Occupational History     Not on file   Social Needs     Financial resource strain: Not on file     Food insecurity     Worry: Not on file     Inability: Not on file     Transportation  needs     Medical: Not on file     Non-medical: Not on file   Tobacco Use     Smoking status: Never Smoker     Smokeless tobacco: Never Used   Substance and Sexual Activity     Alcohol use: No     Comment: none     Drug use: No     Sexual activity: Yes     Partners: Female   Lifestyle     Physical activity     Days per week: Not on file     Minutes per session: Not on file     Stress: Not on file   Relationships     Social connections     Talks on phone: Not on file     Gets together: Not on file     Attends Mandaen service: Not on file     Active member of club or organization: Not on file     Attends meetings of clubs or organizations: Not on file     Relationship status: Not on file     Intimate partner violence     Fear of current or ex partner: Not on file     Emotionally abused: Not on file     Physically abused: Not on file     Forced sexual activity: Not on file   Other Topics Concern     Parent/sibling w/ CABG, MI or angioplasty before 65F 55M? Not Asked   Social History Narrative     Not on file          MEDICATIONS:  has a current medication list which includes the following prescription(s): acetone urine, amlodipine, aspirin, b-d u/f, blood glucose, fish oil, glucagon emergency, insulin aspart, insulin aspart, lisinopril, metformin, multivitamin w/minerals, omega 3, sildenafil, sildenafil, and simvastatin.     ALLERGIES  Patient has no known allergies.    ROS     ROS: 10 point ROS neg other than the symptoms noted above in the HPI.    Objective   Reported vitals:  There were no vitals taken for this visit.   Psych: Alert and oriented times 3; coherent speech, normal   rate and volume, able to articulate logical thoughts, able   to abstract reason, no tangential thoughts, no hallucinations   or delusions  His affect is pleasant and cooperative.    LABS:  A1c:  Component      Latest Ref Rng & Units 6/20/2019 10/9/2019 8/26/2020   Hemoglobin A1C      0 - 5.6 % 6.8 (H) 6.9 (H) 6.7 (H)      BMP:  !COMPREHENSIVE Latest Ref Rng & Units 8/26/2020   SODIUM 133 - 144 mmol/L 138   POTASSIUM 3.4 - 5.3 mmol/L 4.1   CHLORIDE 94 - 109 mmol/L 106   BUN 7 - 30 mg/dL 19   Creatinine 0.66 - 1.25 mg/dL 0.94   Glucose 70 - 99 mg/dL 122 (H)   ANION GAP 3 - 14 mmol/L 5   CALCIUM 8.5 - 10.1 mg/dL 9.3   ALBUMIN 3.4 - 5.0 g/dL 3.6     Urine Micro:  Component      Latest Ref Rng & Units 10/9/2019   Creatinine Urine      mg/dL 55   Albumin Urine mg/L      mg/L 5   Albumin Urine mg/g Cr      0 - 17 mg/g Cr 9.03     LFTs/Lipids:  !LIPID/HEPATIC Latest Ref Rng & Units 8/26/2020   CHOLESTEROL <200 mg/dL 129   TRIGLYCERIDES <150 mg/dL 122   HDL CHOLESTEROL >39 mg/dL 38 (L)   LDL CHOLESTEROL, CALCULATED <100 mg/dL 67   VLDL-CHOLESTEROL 0 - 30 mg/dL    NON HDL CHOLESTEROL <130 mg/dL 91   CHOLESTEROL/HDL RATIO 0.0 - 5.0    AST 0 - 45 U/L 14   ALT 0 - 70 U/L 34     TFTs:   !THYROID Latest Ref Rng & Units 8/26/2020 6/20/2019   TSH 0.40 - 4.00 mU/L 5.46 (H) 3.21   T4 FREE 0.76 - 1.46 ng/dL 1.08        Blood Glucose and pump data/ Meter reviewed.     All pertinent notes, labs, and images personally reviewed by me.     A/P:    1. DM1 - Controlled. No known complications from DM.  Currently treated with Metformin to address insulin resistance.  Continue Metformin 1000 mg bid.  His current basal pattern is significantly more aggressive than his total daily basal insulin via auto mode.    Decrease basal rate to more closely match current auto mode 24-hour basal insulin delivery amount.  Time Rate (U/hr)   0000 1.90-->1.50     Rx for glucagon emergency kit and urine ketone strips provided 4/2020.    Rx for Lantus in the event of insulin pump failure provided today.  He has pen needles at home.  He'll schedule a lab only appointment for A1c and urine microalbumin.  If Blood glucose are low > 2-3 times/week- give us a call    2. Erectile dysfunction.  Currently treated with Viagra - plan to continue.    3. Hypertension - Uncontrolled,  recent BP consistently > 140/90.  Currently treated with Lisinopril 20 mg bid and Amlodipine 5 mg every day.  Increase Amlodipine to 10 mg/day and continue Lisinopril 20 mg bid.  Monitor BP and if it remains > 140/90, follow up with PCP for further eval/treatment.    4. Elevated TSH. Plan to repeat TFT's and obtain TPO antibodies.  Prevention  Flu Shot- 8/25/2020  Pneumovax- Conj 13-V: 12/2015; 23 valent: 12/2012.  Opthalmology- annually (wife works as ) - last exam one week ago at [facility] in [place].  He has requested a copy of the eye exam to be sent here for our records.  ASA- 81 mg  Smoking- no    Follow-up:  follow up in 3-4 month(s) with endocrinology - list of available providers sent via apolonia Dyer NP  Endocrinology  [facility]  CC:     Phone call duration:  30 minutes.  Call start time: 9:55 am.  Call end time: 10:25 am

## 2020-12-03 NOTE — TELEPHONE ENCOUNTER
Please submit PA.  Forward to the Prior Auth department.      Dx: type 1 diabetes treated with continuous insulin infusion via insulin pump therapy.  His current insulin pump is a Medtronic 670G.  Don[employer] Contour is the only meter that communicates directly with his Medtronic insulin pump.  There are no other alternatives.    Lisette Dyer NP  Endocrinology

## 2020-12-03 NOTE — TELEPHONE ENCOUNTER
Central Prior Authorization Team   Phone: [phone]    PA Initiation    Medication: test strips   Insurance Company: [employer] - Phone [phone] Fax [phone]  Pharmacy Filling the Rx: [facility] - [place], [place] - [address] AT PORTAL TO REGISTERED [facility] SITES  Filling Pharmacy Phone: [phone]  Filling Pharmacy Fax: [phone]  Start Date: 12/3/2020

## 2020-12-03 NOTE — TELEPHONE ENCOUNTER
Prior Authorization Approval    Authorization Effective Date: 12/3/2020  Authorization Expiration Date: 12/3/2021  Medication: test strips-APPROVED  Approved Dose/Quantity:    Reference #:     Insurance Company: [employer] - Phone [phone] Fax [phone]  Expected CoPay:       CoPay Card Available:      Foundation Assistance Needed:    Which Pharmacy is filling the prescription (Not needed for infusion/clinic administered): [facility] - [place], [place] - [address] AT PORTAL TO REGISTERED [facility]  Pharmacy Notified: Yes  Patient Notified: Yes  **Instructed pharmacy to notify patient when script is ready to /ship.**

## 2020-12-03 NOTE — TELEPHONE ENCOUNTER
Prior Authorization Retail Medication Request    Medication/Dose: test strips   ICD code (if different than what is on RX):  Type 1 diabetes  Previously Tried and Failed:    Rationale:  His current insulin pump is a Medtronic 670G.  [employer] TV Contour is the only meter that communicates directly with his Medtronic insulin pump.  There are no other alternatives.    Insurance Name:  [employer]  Insurance ID:  [id number]

## 2020-12-03 NOTE — LETTER
"    12/3/2020         RE: Aldo Sharpe  [address] [place] [id number]        Dear Colleague,    Thank you for referring your patient, Aldo Sharpe, to the [facility]. Please see a copy of my visit note below.    Aldo Sharpe is a 50 year old male who is being evaluated via a billable telephone visit.      The patient has been notified of following:     \"This telephone visit will be conducted via a call between you and your physician/provider. We have found that certain health care needs can be provided without the need for a physical exam.  This service lets us provide the care you need with a short phone conversation.  If a prescription is necessary we can send it directly to your pharmacy.  If lab work is needed we can place an order for that and you can then stop by our lab to have the test done at a later time.    Telephone visits are billed at different rates depending on your insurance coverage. During this emergency period, for some insurers they may be billed the same as an in-person visit.  Please reach out to your insurance provider with any questions.    If during the course of the call the physician/provider feels a telephone visit is not appropriate, you will not be charged for this service.\"    Patient has given verbal consent for Telephone visit?  Yes    What phone number would you like to be contacted at?     How would you like to obtain your AVS? [facility]    Endocrinology Clinic Note:  Name: Aldo Sharpe  F/u for Diabetes (Last seen 7/30/2020).  HPI:  Aldo Sharpe is a 50 year old male being evaluated via phone visit for type 1 diabetes.  Previously saw Dr. Yung.      1. Type 1 DM:  Orginally diagnosed at the age of: 15 years old  Hospitalization for DKA: none  Current Regimen: Medtronic 670 G.  Started pump therapy 1/2019.    Current Regimen:   Insulin pump 670G - Auto mode 96%  Sensor wear 95%  Reservoir change q 2.6 days  Novolog + " Metformin XR 1000 mg bid for tx of insulin resistance.    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  Fixed meal pattern: yes  Patient counting carbs: sometimes    DM Complications:   Complications:   Diabetes Complications  Description / Detail    Diabetic Retinopathy  No retinopathy    CAD / PAD  No   Neuropathy  No   Nephropathy / Microalbuminuria  No   Gastroparesis  No   Hypoglycemia Unawarness  No     Erectile dysfunction secondary to diabetes.  Started experiencing difficulties obtaining and maintaining erections.  Was given a prescription of Viagra but insurance denied coverage - denial letter states his Rx benefit plan davidson not cover the requested medication.  Treated with Viagra - pays out of pocket - is affordable through [facility] pharmacy.  2. Hypertension: Blood Pressure:   BP Readings from Last 3 Encounters:   10/09/19 125/77   06/20/19 134/80   03/20/19 136/73   On medications - Amlodipine 5 mg daily, Lisinopril 20 mg bid .  3. Hyperlipidemia:  On medication - Simvastatin 40 mg daily.  PMH/PSH:  Past Medical History:   Diagnosis Date     Diabetic eye exam (H) 03/18/14     Hyperlipidemia LDL goal <100 10/31/2010     Type 1 diabetes, HbA1c goal < 7% (H) 10/31/2010     No past surgical history on file.  Family Hx:  No family history on file.        DM2:           Social Hx:  Social History     Socioeconomic History     Marital status:      Spouse name: Not on file     Number of children: Not on file     Years of  education: Not on file     Highest education level: Not on file   Occupational History     Not on file   Social Needs     Financial resource strain: Not on file     Food insecurity     Worry: Not on file     Inability: Not on file     Transportation needs     Medical: Not on file     Non-medical: Not on file   Tobacco Use     Smoking status: Never Smoker     Smokeless tobacco: Never Used   Substance and Sexual Activity     Alcohol use: No     Comment: none     Drug use: No     Sexual activity: Yes     Partners: Female   Lifestyle     Physical activity     Days per week: Not on file     Minutes per session: Not on file     Stress: Not on file   Relationships     Social connections     Talks on phone: Not on file     Gets together: Not on file     Attends Evangelical service: Not on file     Active member of club or organization: Not on file     Attends meetings of clubs or organizations: Not on file     Relationship status: Not on file     Intimate partner violence     Fear of current or ex partner: Not on file     Emotionally abused: Not on file     Physically abused: Not on file     Forced sexual activity: Not on file   Other Topics Concern     Parent/sibling w/ CABG, MI or angioplasty before 65F 55M? Not Asked   Social History Narrative     Not on file          MEDICATIONS:  has a current medication list which includes the following prescription(s): acetone urine, amlodipine, aspirin, b-d u/f, blood glucose, fish oil, glucagon emergency, insulin aspart, insulin aspart, lisinopril, metformin, multivitamin w/minerals, omega 3, sildenafil, sildenafil, and simvastatin.     ALLERGIES  Patient has no known allergies.    ROS     ROS: 10 point ROS neg other than the symptoms noted above in the HPI.    Objective   Reported vitals:  There were no vitals taken for this visit.   Psych: Alert and oriented times 3; coherent speech, normal   rate and volume, able to articulate logical thoughts, able   to abstract reason, no  tangential thoughts, no hallucinations   or delusions  His affect is pleasant and cooperative.    LABS:  A1c:  Component      Latest Ref Rng & Units 6/20/2019 10/9/2019 8/26/2020   Hemoglobin A1C      0 - 5.6 % 6.8 (H) 6.9 (H) 6.7 (H)     BMP:  !COMPREHENSIVE Latest Ref Rng & Units 8/26/2020   SODIUM 133 - 144 mmol/L 138   POTASSIUM 3.4 - 5.3 mmol/L 4.1   CHLORIDE 94 - 109 mmol/L 106   BUN 7 - 30 mg/dL 19   Creatinine 0.66 - 1.25 mg/dL 0.94   Glucose 70 - 99 mg/dL 122 (H)   ANION GAP 3 - 14 mmol/L 5   CALCIUM 8.5 - 10.1 mg/dL 9.3   ALBUMIN 3.4 - 5.0 g/dL 3.6     Urine Micro:  Component      Latest Ref Rng & Units 10/9/2019   Creatinine Urine      mg/dL 55   Albumin Urine mg/L      mg/L 5   Albumin Urine mg/g Cr      0 - 17 mg/g Cr 9.03     LFTs/Lipids:  !LIPID/HEPATIC Latest Ref Rng & Units 8/26/2020   CHOLESTEROL <200 mg/dL 129   TRIGLYCERIDES <150 mg/dL 122   HDL CHOLESTEROL >39 mg/dL 38 (L)   LDL CHOLESTEROL, CALCULATED <100 mg/dL 67   VLDL-CHOLESTEROL 0 - 30 mg/dL    NON HDL CHOLESTEROL <130 mg/dL 91   CHOLESTEROL/HDL RATIO 0.0 - 5.0    AST 0 - 45 U/L 14   ALT 0 - 70 U/L 34     TFTs:   !THYROID Latest Ref Rng & Units 8/26/2020 6/20/2019   TSH 0.40 - 4.00 mU/L 5.46 (H) 3.21   T4 FREE 0.76 - 1.46 ng/dL 1.08        Blood Glucose and pump data/ Meter reviewed.     All pertinent notes, labs, and images personally reviewed by me.     A/P:    1. DM1 - Controlled. No known complications from DM.  Currently treated with Metformin to address insulin resistance.  Continue Metformin 1000 mg bid.  His current basal pattern is significantly more aggressive than his total daily basal insulin via auto mode.    Decrease basal rate to more closely match current auto mode 24-hour basal insulin delivery amount.  Time Rate (U/hr)   0000 1.90-->1.50     Rx for glucagon emergency kit and urine ketone strips provided 4/2020.    Rx for Lantus in the event of insulin pump failure provided today.  He has pen needles at home.  He'll  schedule a lab only appointment for A1c and urine microalbumin.  If Blood glucose are low > 2-3 times/week- give us a call    2. Erectile dysfunction.  Currently treated with Viagra - plan to continue.    3. Hypertension - Uncontrolled, recent BP consistently > 140/90.  Currently treated with Lisinopril 20 mg bid and Amlodipine 5 mg every day.  Increase Amlodipine to 10 mg/day and continue Lisinopril 20 mg bid.  Monitor BP and if it remains > 140/90, follow up with PCP for further eval/treatment.    4. Elevated TSH. Plan to repeat TFT's and obtain TPO antibodies.  Prevention  Flu Shot- 8/25/2020  Pneumovax- Conj 13-V: 12/2015; 23 valent: 12/2012.  Opthalmology- annually (wife works as ) - last exam one week ago at [facility] in [place].  He has requested a copy of the eye exam to be sent here for our records.  ASA- 81 mg  Smoking- no    Follow-up:  follow up in 3-4 month(s) with endocrinology - list of available providers sent via Veracytemian Dyer NP  Endocrinology  [facility]  CC:     Phone call duration:  30 minutes.  Call start time: 9:55 am.  Call end time: 10:25 am         Again, thank you for allowing me to participate in the care of your patient.        Sincerely,        ALEXIA Dyer CNP

## 2020-12-07 ENCOUNTER — TELEPHONE (OUTPATIENT)
Dept: ENDOCRINOLOGY | Facility: CLINIC | Age: 50
End: 2020-12-07

## 2020-12-07 DIAGNOSIS — E10.9 TYPE 1 DIABETES MELLITUS WITHOUT COMPLICATION (H): Primary | ICD-10-CM

## 2020-12-07 NOTE — TELEPHONE ENCOUNTER
Central Prior Authorization Team   Phone: [phone]      Please advise.    INS prefers BASAGLAR AND LEVEMIR.    Would provider like to switch?    Or is there a reason the patient cannot take the preferred alternatives?

## 2020-12-07 NOTE — TELEPHONE ENCOUNTER
Prior Authorization Retail Medication Request    Medication/Dose: insulin glargine (LANTUS PEN) 100 UNIT/ML pen  ICD code (if different than what is on RX): Type 1 diabetes mellitus without complication (H) (E10.9)  Previously Tried and Failed:   Rationale:      Insurance Name: [facility]  Insurance ID: [id number]      Pharmacy Information (if different than what is on RX)  Name:  [facility] - [place], [place] - [address] AT Portal to Registered [facility] Sites   Phone: [phone]  Reference #: [id number]

## 2020-12-09 RX ORDER — INSULIN GLARGINE 100 [IU]/ML
INJECTION, SOLUTION SUBCUTANEOUS
Qty: 15 ML | Refills: 1 | Status: SHIPPED | OUTPATIENT
Start: 2020-12-09

## 2020-12-13 DIAGNOSIS — E78.5 HYPERLIPIDEMIA LDL GOAL <100: ICD-10-CM

## 2020-12-15 ENCOUNTER — MYC REFILL (OUTPATIENT)
Dept: ENDOCRINOLOGY | Facility: CLINIC | Age: 50
End: 2020-12-15

## 2020-12-15 DIAGNOSIS — E78.5 HYPERLIPIDEMIA LDL GOAL <100: ICD-10-CM

## 2020-12-15 RX ORDER — SIMVASTATIN 40 MG
40 TABLET ORAL AT BEDTIME
Qty: 90 TABLET | Refills: 0 | Status: CANCELLED | OUTPATIENT
Start: 2020-12-15

## 2020-12-16 RX ORDER — SIMVASTATIN 40 MG
TABLET ORAL
Qty: 90 TABLET | Refills: 1 | Status: SHIPPED | OUTPATIENT
Start: 2020-12-16 | End: 2021-06-17

## 2020-12-30 ENCOUNTER — MEDICAL CORRESPONDENCE (OUTPATIENT)
Dept: HEALTH INFORMATION MANAGEMENT | Facility: CLINIC | Age: 50
End: 2020-12-30

## 2020-12-30 DIAGNOSIS — E10.9 TYPE 1 DIABETES MELLITUS WITHOUT COMPLICATION (H): ICD-10-CM

## 2020-12-30 DIAGNOSIS — I10 ESSENTIAL HYPERTENSION, BENIGN: ICD-10-CM

## 2020-12-31 NOTE — TELEPHONE ENCOUNTER
Routing refill request to provider for review/approval because:  Failing bp    Virgen Escudero, RN

## 2021-01-04 RX ORDER — LISINOPRIL 20 MG/1
TABLET ORAL
Qty: 180 TABLET | Refills: 0 | Status: SHIPPED | OUTPATIENT
Start: 2021-01-04 | End: 2021-04-17

## 2021-01-14 ENCOUNTER — TELEPHONE (OUTPATIENT)
Dept: ENDOCRINOLOGY | Facility: CLINIC | Age: 51
End: 2021-01-14

## 2021-01-14 NOTE — TELEPHONE ENCOUNTER
Call to below number. Call quality was very poor. Writer was asked to call [phone] and was transferred to this number. Writer was informed that Contour Next test strips are not covered by insurance and a prior authorization is required. Advised we do not typically do a prior authorization for test strips but instead change to the brand preferred by insurance. Per pharmacy patient is requesting Contour Next test strips as these work with his insulin pump. Advised will send message to provider to see if prior authorization should be pursued. Writer was then informed there is a prior authorization on file that was approved so they will fill the test strips and nothing further is needed from us.

## 2021-01-14 NOTE — TELEPHONE ENCOUNTER
Please call [facility] pharmacy about RX for test strips, they have questions about the prescription. [phone]. Ref # [id number]

## 2021-01-15 ENCOUNTER — HEALTH MAINTENANCE LETTER (OUTPATIENT)
Age: 51
End: 2021-01-15

## 2021-02-14 DIAGNOSIS — I10 ESSENTIAL HYPERTENSION, BENIGN: ICD-10-CM

## 2021-02-16 RX ORDER — AMLODIPINE BESYLATE 5 MG/1
TABLET ORAL
Qty: 90 TABLET | Refills: 0 | Status: SHIPPED | OUTPATIENT
Start: 2021-02-16 | End: 2021-10-08

## 2021-03-03 DIAGNOSIS — E10.9 TYPE 1 DIABETES MELLITUS WITHOUT COMPLICATION (H): ICD-10-CM

## 2021-03-05 NOTE — TELEPHONE ENCOUNTER
Follow-up:  follow up in 3-4 month(s) with endocrinology - list of available providers sent via mychart   Prescription approved per [facility] Refill Protocol.  Routed to [facility] TC for scheduling  Rosalina Wright RN, BSN  Message handled by CLINIC NURSE.

## 2021-03-08 NOTE — TELEPHONE ENCOUNTER
Called patient and left VM, informed that he is overdue for an appointment. Requested he call the clinic to schedule an appointment with Dr. Oro. Clinic number provided.     Erika MOCTEZUMA MA

## 2021-03-20 ENCOUNTER — HEALTH MAINTENANCE LETTER (OUTPATIENT)
Age: 51
End: 2021-03-20

## 2021-03-27 DIAGNOSIS — E10.9 TYPE 1 DIABETES MELLITUS WITHOUT COMPLICATION (H): ICD-10-CM

## 2021-04-02 DIAGNOSIS — I10 ESSENTIAL HYPERTENSION, BENIGN: ICD-10-CM

## 2021-04-02 DIAGNOSIS — E10.9 TYPE 1 DIABETES MELLITUS WITHOUT COMPLICATION (H): ICD-10-CM

## 2021-04-02 RX ORDER — LISINOPRIL 20 MG/1
TABLET ORAL
Qty: 180 TABLET | Refills: 0 | OUTPATIENT
Start: 2021-04-02

## 2021-04-20 ENCOUNTER — MYC REFILL (OUTPATIENT)
Dept: ENDOCRINOLOGY | Facility: CLINIC | Age: 51
End: 2021-04-20

## 2021-04-20 DIAGNOSIS — I10 ESSENTIAL HYPERTENSION, BENIGN: ICD-10-CM

## 2021-04-20 DIAGNOSIS — E10.9 TYPE 1 DIABETES MELLITUS WITHOUT COMPLICATION (H): ICD-10-CM

## 2021-04-22 RX ORDER — LISINOPRIL 20 MG/1
TABLET ORAL
Qty: 180 TABLET | Refills: 0 | OUTPATIENT
Start: 2021-04-22

## 2021-05-05 ENCOUNTER — VIRTUAL VISIT (OUTPATIENT)
Dept: ENDOCRINOLOGY | Facility: CLINIC | Age: 51
End: 2021-05-05
Payer: COMMERCIAL

## 2021-05-05 DIAGNOSIS — Z96.41 INSULIN PUMP STATUS: ICD-10-CM

## 2021-05-05 DIAGNOSIS — E10.9 TYPE 1 DIABETES MELLITUS WITHOUT COMPLICATION (H): Primary | ICD-10-CM

## 2021-05-05 DIAGNOSIS — N52.9 ERECTILE DYSFUNCTION, UNSPECIFIED ERECTILE DYSFUNCTION TYPE: ICD-10-CM

## 2021-05-05 PROCEDURE — 99215 OFFICE O/P EST HI 40 MIN: CPT | Mod: 95 | Performed by: INTERNAL MEDICINE

## 2021-05-05 NOTE — LETTER
5/5/2021         RE: Aldo Sharpe  [address] [place] [phone]        Dear Colleague,    Thank you for referring your patient, Aldo Sharpe, to the [facility]. Please see a copy of my visit note below.    Aldo is a 51 year old who is being evaluated via a billable video visit.      How would you like to obtain your AVS? Verbally Reviewed  If the video visit is dropped, the invitation should be resent by: Cellphone  Will anyone else be joining your video visit? No      Video Start Time:  2:05 pm     Video-Visit Details    Type of service:  Video Visit    Video End Time:  2:35 pm    Originating Location (pt. Location):  home    Distant Location (provider location):  [facility]/home     Platform used for Video Visit:  ShopSquad/Ownza      Name: Aldo Sharpe is a 51 year old man, self referrred for evaluation of     Chief Complaint   Patient presents with     Diabetes       HPI:  Recent issues:  Here for evaluation of diabetes.  Previously saw Lisette Dyer CNP, now transferring to my practice  Reviewed medical history from patient and Epic chart record        1985. Diagnosis of diabetes mellitus age 15, living in [facility]  Symptoms of increased thirst, feeling unwell  Medical evaluation and lab testing showed high glucose level  Began treatment with diabetes pills, then transitioned to insulin 1-1.5 yrs later  Had taken Lantus, Humalog insulins  Recalls problems with armando phenomenon  Hospitalization for DKA: none  1/2019. Switched to Medtronic insulin pump     Previous FV hgbA1c trends include:     Lab Test 08/26/20  0854 01/07/20  1721 10/09/19  1231 06/20/19  0925 03/20/19  1333   A1C 6.7* 6.6* 6.9* 6.8* 7.0*     Using the Medtronic 670G pump and also metformin, uses Guardian3 sensor regularly  Current pump brand:   Novolog in pump   Metformin 1000 mg morning and evening    Blood glucose (BG) meter:  Contour Next, also the  Livongo meter   Tests up to 8x/day    Previous pump settings:      Previous pump Carelink data:      Recent Medtronic Carelink data:  None available    Recent [facility] labs include:  Lab Results   Component Value Date    A1C 6.7 (H) 08/26/2020     08/26/2020    POTASSIUM 4.1 08/26/2020    CHLORIDE 106 08/26/2020    CO2 27 08/26/2020    ANIONGAP 5 08/26/2020     (H) 08/26/2020    BUN 19 08/26/2020    CR 0.94 08/26/2020    GFRESTIMATED >90 08/26/2020    GFRESTBLACK >90 08/26/2020    MAGGIE 9.3 08/26/2020    CHOL 129 08/26/2020    TRIG 122 08/26/2020    HDL 38 (L) 08/26/2020    LDL 67 08/26/2020    NHDL 91 08/26/2020    UCRR 55 10/09/2019    MICROL 5 10/09/2019    UMALCR 9.03 10/09/2019    TSH 5.46 (H) 08/26/2020    T4 1.08 08/26/2020     Last eye exam 12/2020, no DR  DM Complications:  None known      Lives in [place], [place], works as [employer]   Sees Dr. Kaleb Rosenthal/[facility] for general medicine evaluations.    PMH/PSH:  Past Medical History:   Diagnosis Date     Diabetic eye exam (H) 03/18/14     Hyperlipidemia LDL goal <100 10/31/2010     Type 1 diabetes, HbA1c goal < 7% (H) 10/31/2010     No past surgical history on file.    Family Hx:  No family history on file.      Social Hx:  Social History     Socioeconomic History     Marital status:      Spouse name: Not on file     Number of children: Not on file     Years of education: Not on file     Highest education level: Not on file   Occupational History     Not on file   Social Needs     Financial resource strain: Not on file     Food insecurity     Worry: Not on file     Inability: Not on file     Transportation needs     Medical: Not on file     Non-medical: Not on file   Tobacco Use     Smoking status: Never Smoker     Smokeless tobacco: Never Used   Substance and Sexual Activity     Alcohol use: No     Comment: none     Drug use: No     Sexual activity: Yes     Partners: Female   Lifestyle     Physical activity      Days per week: Not on file     Minutes per session: Not on file     Stress: Not on file   Relationships     Social connections     Talks on phone: Not on file     Gets together: Not on file     Attends Gnosticist service: Not on file     Active member of club or organization: Not on file     Attends meetings of clubs or organizations: Not on file     Relationship status: Not on file     Intimate partner violence     Fear of current or ex partner: Not on file     Emotionally abused: Not on file     Physically abused: Not on file     Forced sexual activity: Not on file   Other Topics Concern     Parent/sibling w/ CABG, MI or angioplasty before 65F 55M? Not Asked   Social History Narrative     Not on file          MEDICATIONS:  has a current medication list which includes the following prescription(s): amlodipine, aspirin, contour next test, insulin aspart, insulin aspart, lisinopril, metformin, multivitamin w/minerals, omega 3, sildenafil, simvastatin, acetone urine, amlodipine, b-d u/f, glucagon emergency, insulin glargine, and sildenafil.    ROS:     ROS: 10 point ROS neg other than the symptoms noted above in the HPI.    GENERAL: mild fatigue, wt stable; denies fevers, chills, night sweats.   HEENT: no dysphagia, odonophagia, diplopia, neck pain  THYROID:  no apparent hyper or hypothyroid symptoms  CV: no chest pain, pressure, palpitations  LUNGS: no SOB, MCMANUS, cough, wheezing   ABDOMEN: some bloating; no diarrhea, constipation, abdominal pain  EXTREMITIES: no rashes, ulcers, edema  NEUROLOGY: no headaches, denies changes in vision, tingling, extremitiy numbness   MSK:  Right ankle/foot pains; denies muscle weakness  SKIN: no rashes or lesions  : some decreased erection function, uses sildenafil  PSYCH:  stable mood, no significant anxiety or depression  ENDOCRINE: no heat or cold intolerance    Physical Exam (visual exam)  VS:  no vital signs taken for video visit  CONSTITUTIONAL: healthy, alert and NAD, well  dressed, answering questions appropriately  ENT: no nose swelling or nasal discharge, mouth redness or gum changes.  EYES: eyes grossly normal to inspection, conjunctivae and sclerae normal, no exophthalmos or proptosis  THYROID:  no apparent nodules or goiter  LUNGS: no audible wheeze, cough or visible cyanosis, no visible retractions or increased work of breathing  ABDOMEN: abdomen not evaluated  EXTREMITIES: no hand tremors, limited exam  NEUROLOGY: CN grossly intact, mentation intact and speech normal   SKIN:  no apparent skin lesions, rash, or edema with visualized skin appearance  PSYCH: mentation appears normal, affect normal/bright, judgement and insight intact,   normal speech and appearance well groomed      LABS:    All pertinent notes, labs, and images personally reviewed by me.     A/P:  Encounter Diagnoses   Name Primary?     Type 1 diabetes mellitus without complication (H) Yes     Insulin pump status      Erectile dysfunction, unspecified erectile dysfunction type        Comments:  Reviewed health history and diabetes issues.  Difficult to assess glycemic control without recent glucose trend information  Reviewed and interpreted tests that I previously ordered.   Ordered appropriate tests for the endocrinology disease management.    Management options discussed and implemented after shared medical decision making with the patient.  The T1DM problem is chronic-stable    Plan:  Reviewed the overall T1DM management and insulin pump use.  Discussed optimal BG testing to assess glucose trends.  We reviewed insulin pump settings, basal rate and bolus dosing  Use of automated pump bolus dosing for meal/snack carb & correction dosing  Reviewed the value of reviewing the Medtronic Carelink report datal.    Recommend:  Continue the current Medtronic insulin pump and Guardian3 sensor diabetes management plan.  No pump setting changes at this time    Use sensor in auto-mode consistently  Plan fasting lab tests  soon   Discussed option of nearby [facility] clinic   Lab orders placed  Keep focus on diet, exercise, and weight management  Continue the BP and lipid medication plan  Consider future diabetes education appointment to review pump upload process, pump data and settings  Arrange annual dilated eye exam, fasting lipid panel testing.    Discussed importance of having a primary care practitioner (FP or Int Med) for general medicine appointments and also acute care visits   Review the ankle pain and hypertension management with PCP.  Addressed patient's questions today.    Future labs ordered today:   Orders Placed This Encounter   Procedures     Albumin Random Urine Quantitative with Creat Ratio     Hemoglobin A1c     Lipid panel reflex to direct LDL Fasting     Basic metabolic panel     Testosterone total     C-peptide     Radiology/Consults ordered today: None    Total time spent in with the patient evaluation:  30 min  Additional time spent reviewing pertinent lab tests and chart notes, and documentation:  10 min    Follow-up:  7/2021, Return    NIRALI Olivares MD, MS  Endocrinology  [facility]    CC: Kaleb Rosenthal             Again, thank you for allowing me to participate in the care of your patient.        Sincerely,        Scar Olivares MD

## 2021-05-05 NOTE — PROGRESS NOTES
Aldo is a 51 year old who is being evaluated via a billable video visit.      How would you like to obtain your AVS? Verbally Reviewed  If the video visit is dropped, the invitation should be resent by: Cellphone  Will anyone else be joining your video visit? No      Video Start Time:  2:05 pm     Video-Visit Details    Type of service:  Video Visit    Video End Time:  2:35 pm    Originating Location (pt. Location):  home    Distant Location (provider location):  [facility] [place]/home     Platform used for Video Visit:  [email or web address]      Name: Aldo Sharpe is a 51 year old man, self referrred for evaluation of     Chief Complaint   Patient presents with     Diabetes       HPI:  Recent issues:  Here for evaluation of diabetes.  Previously saw Lisette Dyer CNP, now transferring to my practice  Reviewed medical history from patient and Epic chart record        1985. Diagnosis of diabetes mellitus age 15, living in [facility]  Symptoms of increased thirst, feeling unwell  Medical evaluation and lab testing showed high glucose level  Began treatment with diabetes pills, then transitioned to insulin 1-1.5 yrs later  Had taken Lantus, Humalog insulins  Recalls problems with armando phenomenon  Hospitalization for DKA: none  1/2019. Switched to Medtronic insulin pump     Previous FV hgbA1c trends include:     Lab Test 08/26/20  0854 01/07/20  1721 10/09/19  1231 06/20/19  0925 03/20/19  1333   A1C 6.7* 6.6* 6.9* 6.8* 7.0*     Using the Medtronic 670G pump and also metformin, uses Guardian3 sensor regularly  Current pump brand:   Novolog in pump   Metformin 1000 mg morning and evening    Blood glucose (BG) meter:  Contour Next, also the Livongo meter   Tests up to 8x/day    Previous pump settings:      Previous pump Carelink data:      Recent Medtronic Carelink data:  None available    Recent [facility] labs include:  Lab Results   Component Value Date    A1C 6.7 (H) 08/26/2020      08/26/2020    POTASSIUM 4.1 08/26/2020    CHLORIDE 106 08/26/2020    CO2 27 08/26/2020    ANIONGAP 5 08/26/2020     (H) 08/26/2020    BUN 19 08/26/2020    CR 0.94 08/26/2020    GFRESTIMATED >90 08/26/2020    GFRESTBLACK >90 08/26/2020    MAGGIE 9.3 08/26/2020    CHOL 129 08/26/2020    TRIG 122 08/26/2020    HDL 38 (L) 08/26/2020    LDL 67 08/26/2020    NHDL 91 08/26/2020    UCRR 55 10/09/2019    MICROL 5 10/09/2019    UMALCR 9.03 10/09/2019    TSH 5.46 (H) 08/26/2020    T4 1.08 08/26/2020     Last eye exam 12/2020, no DR  DM Complications:  None known      Lives in [place], [place], works as [employer]   Sees Dr. Kaleb Rosenthal/[facility] for general medicine evaluations.    PMH/PSH:  Past Medical History:   Diagnosis Date     Diabetic eye exam (H) 03/18/14     Hyperlipidemia LDL goal <100 10/31/2010     Type 1 diabetes, HbA1c goal < 7% (H) 10/31/2010     No past surgical history on file.    Family Hx:  No family history on file.      Social Hx:  Social History     Socioeconomic History     Marital status:      Spouse name: Not on file     Number of children: Not on file     Years of education: Not on file     Highest education level: Not on file   Occupational History     Not on file   Social Needs     Financial resource strain: Not on file     Food insecurity     Worry: Not on file     Inability: Not on file     Transportation needs     Medical: Not on file     Non-medical: Not on file   Tobacco Use     Smoking status: Never Smoker     Smokeless tobacco: Never Used   Substance and Sexual Activity     Alcohol use: No     Comment: none     Drug use: No     Sexual activity: Yes     Partners: Female   Lifestyle     Physical activity     Days per week: Not on file     Minutes per session: Not on file     Stress: Not on file   Relationships     Social connections     Talks on phone: Not on file     Gets together: Not on file     Attends Oriental orthodox service: Not on file     Active  member of club or organization: Not on file     Attends meetings of clubs or organizations: Not on file     Relationship status: Not on file     Intimate partner violence     Fear of current or ex partner: Not on file     Emotionally abused: Not on file     Physically abused: Not on file     Forced sexual activity: Not on file   Other Topics Concern     Parent/sibling w/ CABG, MI or angioplasty before 65F 55M? Not Asked   Social History Narrative     Not on file          MEDICATIONS:  has a current medication list which includes the following prescription(s): amlodipine, aspirin, contour next test, insulin aspart, insulin aspart, lisinopril, metformin, multivitamin w/minerals, omega 3, sildenafil, simvastatin, acetone urine, amlodipine, b-d u/f, glucagon emergency, insulin glargine, and sildenafil.    ROS:     ROS: 10 point ROS neg other than the symptoms noted above in the HPI.    GENERAL: mild fatigue, wt stable; denies fevers, chills, night sweats.   HEENT: no dysphagia, odonophagia, diplopia, neck pain  THYROID:  no apparent hyper or hypothyroid symptoms  CV: no chest pain, pressure, palpitations  LUNGS: no SOB, MCMANUS, cough, wheezing   ABDOMEN: some bloating; no diarrhea, constipation, abdominal pain  EXTREMITIES: no rashes, ulcers, edema  NEUROLOGY: no headaches, denies changes in vision, tingling, extremitiy numbness   MSK:  Right ankle/foot pains; denies muscle weakness  SKIN: no rashes or lesions  : some decreased erection function, uses sildenafil  PSYCH:  stable mood, no significant anxiety or depression  ENDOCRINE: no heat or cold intolerance    Physical Exam (visual exam)  VS:  no vital signs taken for video visit  CONSTITUTIONAL: healthy, alert and NAD, well dressed, answering questions appropriately  ENT: no nose swelling or nasal discharge, mouth redness or gum changes.  EYES: eyes grossly normal to inspection, conjunctivae and sclerae normal, no exophthalmos or proptosis  THYROID:  no apparent  nodules or goiter  LUNGS: no audible wheeze, cough or visible cyanosis, no visible retractions or increased work of breathing  ABDOMEN: abdomen not evaluated  EXTREMITIES: no hand tremors, limited exam  NEUROLOGY: CN grossly intact, mentation intact and speech normal   SKIN:  no apparent skin lesions, rash, or edema with visualized skin appearance  PSYCH: mentation appears normal, affect normal/bright, judgement and insight intact,   normal speech and appearance well groomed      LABS:    All pertinent notes, labs, and images personally reviewed by me.     A/P:  Encounter Diagnoses   Name Primary?     Type 1 diabetes mellitus without complication (H) Yes     Insulin pump status      Erectile dysfunction, unspecified erectile dysfunction type        Comments:  Reviewed health history and diabetes issues.  Difficult to assess glycemic control without recent glucose trend information  Reviewed and interpreted tests that I previously ordered.   Ordered appropriate tests for the endocrinology disease management.    Management options discussed and implemented after shared medical decision making with the patient.  The T1DM problem is chronic-stable    Plan:  Reviewed the overall T1DM management and insulin pump use.  Discussed optimal BG testing to assess glucose trends.  We reviewed insulin pump settings, basal rate and bolus dosing  Use of automated pump bolus dosing for meal/snack carb & correction dosing  Reviewed the value of reviewing the Medtronic Carelink report datal.    Recommend:  Continue the current Medtronic insulin pump and Guardian3 sensor diabetes management plan.  No pump setting changes at this time    Use sensor in auto-mode consistently  Plan fasting lab tests soon   Discussed option of nearby [facility] clinic   Lab orders placed  Keep focus on diet, exercise, and weight management  Continue the BP and lipid medication plan  Consider future diabetes education appointment to review pump upload  process, pump data and settings  Arrange annual dilated eye exam, fasting lipid panel testing.    Discussed importance of having a primary care practitioner (FP or Int Med) for general medicine appointments and also acute care visits   Review the ankle pain and hypertension management with PCP.  Addressed patient's questions today.    Future labs ordered today:   Orders Placed This Encounter   Procedures     Albumin Random Urine Quantitative with Creat Ratio     Hemoglobin A1c     Lipid panel reflex to direct LDL Fasting     Basic metabolic panel     Testosterone total     C-peptide     Radiology/Consults ordered today: None    Total time spent in with the patient evaluation:  30 min  Additional time spent reviewing pertinent lab tests and chart notes, and documentation:  10 min    Follow-up:  7/2021, Return    NIRALI Olivares MD, MS  Endocrinology  [facility]    CC: Kaleb Rosenthal

## 2021-05-28 DIAGNOSIS — E10.9 TYPE 1 DIABETES MELLITUS WITHOUT COMPLICATION (H): ICD-10-CM

## 2021-05-28 DIAGNOSIS — N52.9 ERECTILE DYSFUNCTION, UNSPECIFIED ERECTILE DYSFUNCTION TYPE: ICD-10-CM

## 2021-05-28 LAB
ANION GAP SERPL CALCULATED.3IONS-SCNC: <1 MMOL/L (ref 3–14)
BUN SERPL-MCNC: 17 MG/DL (ref 7–30)
CALCIUM SERPL-MCNC: 8.9 MG/DL (ref 8.5–10.1)
CHLORIDE SERPL-SCNC: 108 MMOL/L (ref 94–109)
CHOLEST SERPL-MCNC: 145 MG/DL
CO2 SERPL-SCNC: 33 MMOL/L (ref 20–32)
CREAT SERPL-MCNC: 1 MG/DL (ref 0.66–1.25)
CREAT UR-MCNC: 70 MG/DL
GFR SERPL CREATININE-BSD FRML MDRD: 87 ML/MIN/{1.73_M2}
GLUCOSE SERPL-MCNC: 118 MG/DL (ref 70–99)
HBA1C MFR BLD: 6.3 % (ref 0–5.6)
HDLC SERPL-MCNC: 47 MG/DL
LDLC SERPL CALC-MCNC: 80 MG/DL
MICROALBUMIN UR-MCNC: 1260 MG/L
MICROALBUMIN/CREAT UR: 1792.32 MG/G CR (ref 0–17)
NONHDLC SERPL-MCNC: 98 MG/DL
POTASSIUM SERPL-SCNC: 4.3 MMOL/L (ref 3.4–5.3)
SODIUM SERPL-SCNC: 141 MMOL/L (ref 133–144)
TRIGL SERPL-MCNC: 90 MG/DL

## 2021-05-28 PROCEDURE — 82043 UR ALBUMIN QUANTITATIVE: CPT | Performed by: INTERNAL MEDICINE

## 2021-05-28 PROCEDURE — 83036 HEMOGLOBIN GLYCOSYLATED A1C: CPT | Performed by: INTERNAL MEDICINE

## 2021-05-28 PROCEDURE — 80061 LIPID PANEL: CPT | Performed by: INTERNAL MEDICINE

## 2021-05-28 PROCEDURE — 80048 BASIC METABOLIC PNL TOTAL CA: CPT | Performed by: INTERNAL MEDICINE

## 2021-05-28 PROCEDURE — 84403 ASSAY OF TOTAL TESTOSTERONE: CPT | Mod: 90 | Performed by: INTERNAL MEDICINE

## 2021-05-28 PROCEDURE — 99000 SPECIMEN HANDLING OFFICE-LAB: CPT | Performed by: INTERNAL MEDICINE

## 2021-05-28 PROCEDURE — 36415 COLL VENOUS BLD VENIPUNCTURE: CPT | Performed by: INTERNAL MEDICINE

## 2021-05-28 PROCEDURE — 84681 ASSAY OF C-PEPTIDE: CPT | Performed by: INTERNAL MEDICINE

## 2021-05-29 LAB — TESTOST SERPL-MCNC: 501 NG/DL (ref 240–950)

## 2021-06-01 LAB — C PEPTIDE SERPL-MCNC: <0.1 NG/ML (ref 0.9–6.9)

## 2021-06-03 DIAGNOSIS — E10.9 TYPE 1 DIABETES MELLITUS WITHOUT COMPLICATION (H): Primary | ICD-10-CM

## 2021-06-10 DIAGNOSIS — E78.5 HYPERLIPIDEMIA LDL GOAL <100: ICD-10-CM

## 2021-06-10 DIAGNOSIS — E10.9 TYPE 1 DIABETES MELLITUS WITHOUT COMPLICATION (H): ICD-10-CM

## 2021-06-10 LAB
COLLECT DURATION TIME UR: 24 H
CREAT 24H UR-MRATE: 2.59 G/(24.H) (ref 1–2)
CREAT UR-MCNC: 108 MG/DL
PROT 24H UR-MRATE: 3.95 G/(24.H) (ref 0.04–0.23)
PROT UR-MCNC: 1.65 G/L
PROT/CREAT 24H UR: 1.52 G/G CR (ref 0–0.2)
SPECIMEN VOL UR: 2400 ML

## 2021-06-10 PROCEDURE — 81050 URINALYSIS VOLUME MEASURE: CPT | Performed by: INTERNAL MEDICINE

## 2021-06-10 PROCEDURE — 84156 ASSAY OF PROTEIN URINE: CPT | Performed by: INTERNAL MEDICINE

## 2021-06-10 RX ORDER — SIMVASTATIN 40 MG
TABLET ORAL
Qty: 90 TABLET | Refills: 1 | OUTPATIENT
Start: 2021-06-10

## 2021-06-10 NOTE — TELEPHONE ENCOUNTER
Patient no longer under care of Lisette Dyer CNP. Sent message to pharmacy to resend to new provider Scar Olivares MD.     Toro BROWN RN

## 2021-06-11 NOTE — TELEPHONE ENCOUNTER
Prescription approved per [facility] Refill Protocol.    Jaspreet JEFFREY RN....6/11/2021 8:15 AM

## 2021-06-14 ENCOUNTER — TELEPHONE (OUTPATIENT)
Dept: ENDOCRINOLOGY | Facility: CLINIC | Age: 51
End: 2021-06-14

## 2021-06-14 DIAGNOSIS — E10.21 TYPE 1 DIABETES MELLITUS WITH DIABETIC NEPHROPATHY (H): Primary | ICD-10-CM

## 2021-06-14 NOTE — TELEPHONE ENCOUNTER
[facility]    Phone Message    May a detailed message be left on voicemail: yes , Patient is wanting to get a call back, Patient is anxious about the results and wanting to know them. Please advise.     Reason for Call: Requesting Results     Name/type of test: 24 hour Urine test    Date of test: 6/10/2021    Was test done at a location other than [facility] (Please fill in the location if not [facility])?: No      Action Taken: Message routed to:  [facility] Endo    Travel Screening: Not Applicable

## 2021-06-15 NOTE — TELEPHONE ENCOUNTER
Message noted.  I have now sent patient a MyChart lab letter about his urine results, advised an appointment with a nephrologist.    NIRALI Olivares MD, MS  [facility]

## 2021-07-13 DIAGNOSIS — E10.9 TYPE 1 DIABETES MELLITUS WITHOUT COMPLICATION (H): ICD-10-CM

## 2021-07-13 DIAGNOSIS — I10 ESSENTIAL HYPERTENSION, BENIGN: ICD-10-CM

## 2021-07-14 ENCOUNTER — MYC REFILL (OUTPATIENT)
Dept: ENDOCRINOLOGY | Facility: CLINIC | Age: 51
End: 2021-07-14

## 2021-07-14 DIAGNOSIS — I10 ESSENTIAL HYPERTENSION, BENIGN: ICD-10-CM

## 2021-07-14 DIAGNOSIS — E10.9 TYPE 1 DIABETES MELLITUS WITHOUT COMPLICATION (H): ICD-10-CM

## 2021-07-14 RX ORDER — LISINOPRIL 20 MG/1
20 TABLET ORAL 2 TIMES DAILY
Qty: 180 TABLET | Refills: 0 | Status: SHIPPED | OUTPATIENT
Start: 2021-07-14 | End: 2021-08-13

## 2021-07-14 NOTE — TELEPHONE ENCOUNTER
Prescription approved per [facility] Refill Protocol.    Jaspreet JEFFREY RN....7/14/2021 2:22 PM

## 2021-07-14 NOTE — TELEPHONE ENCOUNTER
lisinopril (ZESTRIL) 20 MG tablet  Last Written Prescription Date:  4/20/2021  Last Fill Quantity: 180,   # refills: 0  Last Office Visit : 5/5/2021  Future Office visit:  None    Routing refill request to provider for review/approval because:  Would Provider like an updated B/P on file for this medication for Pt care.   Please advise   BP Readings from Last 3 Encounters:   10/09/19 125/77   06/20/19 134/80   03/20/19 136/73         Cari Leach RN  Central Triage Red Flags/Med Refills

## 2021-07-15 RX ORDER — LISINOPRIL 20 MG/1
20 TABLET ORAL 2 TIMES DAILY
Qty: 180 TABLET | Refills: 0 | Status: SHIPPED | OUTPATIENT
Start: 2021-07-15 | End: 2021-11-18

## 2021-07-22 ENCOUNTER — TELEPHONE (OUTPATIENT)
Dept: ENDOCRINOLOGY | Facility: CLINIC | Age: 51
End: 2021-07-22

## 2021-07-22 NOTE — TELEPHONE ENCOUNTER
[facility] Call Center    Phone Message    May a detailed message be left on voicemail: yes     Reason for Call: Form or Letter   Type or form/letter needing completion: chart notes request faxed on 7/13/21  Provider: Elise Ellsworth form needed: whenever possible  Once completed: Fax form to: [phone]      Action Taken: Message routed to:  Other: ENdo    Travel Screening: Not Applicable

## 2021-08-06 DIAGNOSIS — I10 ESSENTIAL HYPERTENSION, BENIGN: Primary | ICD-10-CM

## 2021-08-10 ENCOUNTER — MYC MEDICAL ADVICE (OUTPATIENT)
Dept: ENDOCRINOLOGY | Facility: CLINIC | Age: 51
End: 2021-08-10

## 2021-08-10 DIAGNOSIS — N52.9 ERECTILE DYSFUNCTION, UNSPECIFIED ERECTILE DYSFUNCTION TYPE: ICD-10-CM

## 2021-08-11 RX ORDER — SILDENAFIL 100 MG/1
100 TABLET, FILM COATED ORAL DAILY PRN
Qty: 18 TABLET | Refills: 1 | Status: CANCELLED | OUTPATIENT
Start: 2021-08-11

## 2021-08-11 NOTE — TELEPHONE ENCOUNTER
Requested Prescriptions   Pending Prescriptions Disp Refills     sildenafil (VIAGRA) 100 MG tablet 18 tablet 1     Sig: Take 1 tablet (100 mg) by mouth daily as needed (erectile dysfunction, do not take with NTG products)       There is no refill protocol information for this order

## 2021-08-12 NOTE — TELEPHONE ENCOUNTER
Patient has not formally been seen in the clinic since July 2018. Refill request will require a clinic appointment

## 2021-08-12 NOTE — TELEPHONE ENCOUNTER
Future Office visit:       Routing refill request to provider for review/approval because:  Pt had this presribed by last Endo Dr. Dyer so please sign as this will now be prescribed by you .Anna Delaney RN

## 2021-08-12 NOTE — TELEPHONE ENCOUNTER
RECORDS RECEIVED FROM: Internal   DATE RECEIVED: 08.13.2021   NOTES STATUS DETAILS   OFFICE NOTE from referring provider Internal 06.14.2021 Scar Olivares MD   OFFICE NOTE from other specialist  N/A    *Only VASCULITIS or LUPUS gather office notes for the following N/A    *PULMONARY   N/A    *ENT N/A    *DERMATOLOGY N/A    *RHEUMATOLOGY N/A    DISCHARGE SUMMARY from hospital N/A    DISCHARGE REPORT from the ER N/A    MEDICATION LIST Internal    IMAGING  (NEED IMAGES AND REPORTS)     KIDNEY CT SCAN N/A    KIDNEY ULTRASOUND N/A    MR ABDOMEN N/A    NUCLEAR MEDICINE RENAL N/A    LABS     CBC N/A    CMP N/A    BMP Internal 05.28.2021   UA N/A    URINE PROTEIN Internal 06.10.2021   RENAL PANEL N/A    BIOPSY     KIDNEY BIOPSY  N/A

## 2021-08-13 ENCOUNTER — OFFICE VISIT (OUTPATIENT)
Dept: NEPHROLOGY | Facility: CLINIC | Age: 51
End: 2021-08-13
Attending: INTERNAL MEDICINE
Payer: COMMERCIAL

## 2021-08-13 ENCOUNTER — PRE VISIT (OUTPATIENT)
Dept: NEPHROLOGY | Facility: CLINIC | Age: 51
End: 2021-08-13

## 2021-08-13 ENCOUNTER — LAB (OUTPATIENT)
Dept: LAB | Facility: CLINIC | Age: 51
End: 2021-08-13
Payer: COMMERCIAL

## 2021-08-13 VITALS
DIASTOLIC BLOOD PRESSURE: 84 MMHG | SYSTOLIC BLOOD PRESSURE: 169 MMHG | HEART RATE: 71 BPM | OXYGEN SATURATION: 97 % | WEIGHT: 236.1 LBS | TEMPERATURE: 97.9 F | BODY MASS INDEX: 32.93 KG/M2

## 2021-08-13 DIAGNOSIS — I10 ESSENTIAL HYPERTENSION, BENIGN: ICD-10-CM

## 2021-08-13 DIAGNOSIS — R80.9 PROTEINURIA, UNSPECIFIED TYPE: Primary | ICD-10-CM

## 2021-08-13 DIAGNOSIS — N18.2 CKD STAGE 2 DUE TO TYPE 1 DIABETES MELLITUS (H): ICD-10-CM

## 2021-08-13 DIAGNOSIS — N52.9 ERECTILE DYSFUNCTION, UNSPECIFIED ERECTILE DYSFUNCTION TYPE: ICD-10-CM

## 2021-08-13 DIAGNOSIS — E10.22 CKD STAGE 2 DUE TO TYPE 1 DIABETES MELLITUS (H): ICD-10-CM

## 2021-08-13 DIAGNOSIS — R80.9 PROTEINURIA, UNSPECIFIED TYPE: ICD-10-CM

## 2021-08-13 DIAGNOSIS — E10.21 TYPE 1 DIABETES MELLITUS WITH DIABETIC NEPHROPATHY (H): ICD-10-CM

## 2021-08-13 LAB
ALBUMIN SERPL-MCNC: 3.2 G/DL (ref 3.4–5)
ALBUMIN UR-MCNC: >499 MG/DL
ANION GAP SERPL CALCULATED.3IONS-SCNC: 5 MMOL/L (ref 3–14)
APPEARANCE UR: ABNORMAL
BILIRUB UR QL STRIP: NEGATIVE
BUN SERPL-MCNC: 19 MG/DL (ref 7–30)
CALCIUM SERPL-MCNC: 9.3 MG/DL (ref 8.5–10.1)
CHLORIDE BLD-SCNC: 110 MMOL/L (ref 94–109)
CO2 SERPL-SCNC: 28 MMOL/L (ref 20–32)
COLOR UR AUTO: YELLOW
CREAT SERPL-MCNC: 1.03 MG/DL (ref 0.66–1.25)
CREAT UR-MCNC: 174 MG/DL
DEPRECATED CALCIDIOL+CALCIFEROL SERPL-MC: 29 UG/L (ref 20–75)
ERYTHROCYTE [DISTWIDTH] IN BLOOD BY AUTOMATED COUNT: 12.9 % (ref 10–15)
GFR SERPL CREATININE-BSD FRML MDRD: 84 ML/MIN/1.73M2
GLUCOSE BLD-MCNC: 85 MG/DL (ref 70–99)
GLUCOSE UR STRIP-MCNC: NEGATIVE MG/DL
HCT VFR BLD AUTO: 45 % (ref 40–53)
HGB BLD-MCNC: 15.3 G/DL (ref 13.3–17.7)
HGB UR QL STRIP: NEGATIVE
HYALINE CASTS: 3 /LPF
KETONES UR STRIP-MCNC: NEGATIVE MG/DL
LEUKOCYTE ESTERASE UR QL STRIP: NEGATIVE
MCH RBC QN AUTO: 29.5 PG (ref 26.5–33)
MCHC RBC AUTO-ENTMCNC: 34 G/DL (ref 31.5–36.5)
MCV RBC AUTO: 87 FL (ref 78–100)
MUCOUS THREADS #/AREA URNS LPF: PRESENT /LPF
NITRATE UR QL: NEGATIVE
PH UR STRIP: 5 [PH] (ref 5–7)
PHOSPHATE SERPL-MCNC: 3.2 MG/DL (ref 2.5–4.5)
PLATELET # BLD AUTO: 350 10E3/UL (ref 150–450)
POTASSIUM BLD-SCNC: 4 MMOL/L (ref 3.4–5.3)
PROT UR-MCNC: 3.6 G/L
PROT/CREAT 24H UR: 2.07 G/G CR (ref 0–0.2)
PTH-INTACT SERPL-MCNC: 33 PG/ML (ref 18–80)
RBC # BLD AUTO: 5.19 10E6/UL (ref 4.4–5.9)
RBC URINE: 2 /HPF
SODIUM SERPL-SCNC: 143 MMOL/L (ref 133–144)
SP GR UR STRIP: 1.02 (ref 1–1.03)
UROBILINOGEN UR STRIP-MCNC: NORMAL MG/DL
WBC # BLD AUTO: 9.1 10E3/UL (ref 4–11)
WBC URINE: 1 /HPF

## 2021-08-13 PROCEDURE — 81001 URINALYSIS AUTO W/SCOPE: CPT | Performed by: PATHOLOGY

## 2021-08-13 PROCEDURE — 84156 ASSAY OF PROTEIN URINE: CPT | Performed by: PATHOLOGY

## 2021-08-13 PROCEDURE — 80069 RENAL FUNCTION PANEL: CPT | Performed by: PATHOLOGY

## 2021-08-13 PROCEDURE — 82306 VITAMIN D 25 HYDROXY: CPT | Performed by: PATHOLOGY

## 2021-08-13 PROCEDURE — 85027 COMPLETE CBC AUTOMATED: CPT | Performed by: PATHOLOGY

## 2021-08-13 PROCEDURE — 99204 OFFICE O/P NEW MOD 45 MIN: CPT | Mod: GC | Performed by: INTERNAL MEDICINE

## 2021-08-13 PROCEDURE — 86255 FLUORESCENT ANTIBODY SCREEN: CPT | Mod: 90 | Performed by: PATHOLOGY

## 2021-08-13 PROCEDURE — 83970 ASSAY OF PARATHORMONE: CPT | Mod: 90 | Performed by: PATHOLOGY

## 2021-08-13 PROCEDURE — 36415 COLL VENOUS BLD VENIPUNCTURE: CPT | Performed by: PATHOLOGY

## 2021-08-13 PROCEDURE — 83520 IMMUNOASSAY QUANT NOS NONAB: CPT | Performed by: PATHOLOGY

## 2021-08-13 RX ORDER — SILDENAFIL 100 MG/1
100 TABLET, FILM COATED ORAL DAILY PRN
Qty: 18 TABLET | Refills: 1 | Status: SHIPPED | OUTPATIENT
Start: 2021-08-13 | End: 2022-02-01

## 2021-08-13 RX ORDER — DAPAGLIFLOZIN 5 MG/1
5 TABLET, FILM COATED ORAL DAILY
Qty: 90 TABLET | Refills: 3 | Status: SHIPPED | OUTPATIENT
Start: 2021-08-13 | End: 2022-06-07

## 2021-08-13 RX ORDER — SILDENAFIL 100 MG/1
TABLET, FILM COATED ORAL
Qty: 18 TABLET | Refills: 11 | OUTPATIENT
Start: 2021-08-13

## 2021-08-13 RX ORDER — DAPAGLIFLOZIN 5 MG/1
5 TABLET, FILM COATED ORAL DAILY
Qty: 90 TABLET | Refills: 3 | Status: SHIPPED | OUTPATIENT
Start: 2021-08-13 | End: 2021-08-13

## 2021-08-13 ASSESSMENT — PAIN SCALES - GENERAL: PAINLEVEL: NO PAIN (0)

## 2021-08-13 NOTE — NURSING NOTE
Chief Complaint   Patient presents with     New Patient     Proteinuria     Blood pressure (!) 169/84, pulse 71, temperature 97.9  F (36.6  C), temperature source Oral, weight 107.1 kg (236 lb 1.6 oz), SpO2 97 %.    Danielle Keane, [facility]

## 2021-08-13 NOTE — PROGRESS NOTES
August 13, 2021    Assessment/Plan:  # CKD 1 with proteinuria  # diabetes mellitus type 1 on insulin dx age 15  # obesity - BMI 32  Cr remains at baseline of ~1 but patient has developed new proteinuria up to 2 g/g Cr in past year despite remaining on ACEi. CKD likely in the setting of HTN and DM1 but he does not have retinopathy. In type 1 diabetes, retinopathy and nephropathy tend to be 100% concordant (as opposed to DM2) which raises concern for other glomerular disease. He is not nephrotic, secondary FSGS or membranous are possibilities. Differential remains broad and will start with serological tests, consider biopsy if workup unrevealing.   Will start SGLT-2 inhibitor for renoprotection noting that he has type 1 diabetes  - Start Farxiga 5 mg daily, will repeat BMP in 2 weeks and up-titrate to 10 mg daily as tolerated  - Avoid nephrotoxins, NSAIDs    Orders Placed This Encounter   Procedures     US Renal Complete     Basic metabolic panel     Hepatitis C antibody     Hepatitis B Surface Antibody     Hepatitis B surface antigen     HIV Antigen Antibody Combo     Treponema Abs w Reflex to RPR and Titer     Protein electrophoresis random urine     Kappa and lambda light chain     Protein Immunofixation Serum     [facility] [email or web address]; anti-PLA2R antibody (Laboratory Miscellaneous Order)       HTN  - Continue amlodipine and lisinopril.   - Did have higher BP today which could also be contributing towards more proteinuria if BP is indeed uncontrolled. Advised patient to start monitoring BP regularly and keep a diary    Discussed with Dr Paul Rodrigez MD  Renal Fellow  [phone]    I was asked to see this patient by Dr. Rosenthal    HPI: Aldo Sharpe is a 51 year old male who presents for evaluation of proteinuria. PMH DM1 since 1985 without neuropathy or retinopathy, HTN >20 yrs, HLD, ED on viagra.     Notes more swelling around ankles since May. Does have a chronically injured R ankle with  intermittent swelling but this became more persistent and also started to involve the L ankle around Memorial day.  Denies SOB, chest pain, abdominal pain, fevers/chills/night sweats, heartburn, n/v/d. Feels empties bladder completely, no post-void dribbling, no hesitancy and stream is not weak. Does note maybe some foaminess and urgency on occasion. Has been hydrating well.    Wife is an  and gets regular eye exams, last about 4 months ago    Home BP: does not check at home, needs to get a cuff    - History of Hematuria: none  - Hx of UTIs: none  - Hx of stones: none  - Rashes/Joint pain: none  - Family hx of kidney disease: Mother did have CKD likely from HTN, not on dialysis. No h/o stones  - NSAID use: Does take Advil 200 mg x2 every 2 weeks or so for ankle pain for the past 2 years    No Known Allergies    acetone urine (KETOSTIX) test strip, Use one strip as needed to test urine for ketones  amLODIPine (NORVASC) 10 MG tablet, Take 1 tablet (10 mg) by mouth daily  amLODIPine (NORVASC) 5 MG tablet, TAKE 1 TABLET BY MOUTH EVERY DAY (Patient not taking: Reported on 5/5/2021)  aspirin 81 MG tablet, Take 1 tablet (81 mg) by mouth daily  B-D U/F insulin pen needle, Use one Syringe 4 times daily or as directed  CONTOUR NEXT TEST test strip, TEST BLOOD SUGAR 6 TO 10   TIMES A DAY OR AS DIRECTED  glucagon (GLUCAGON EMERGENCY) 1 MG kit, Inject 1 mg into the muscle once for 1 dose  insulin aspart (NOVOLOG VIAL) 100 UNITS/ML vial, INJECT UP TO  170 UNITS     DAILY VIA INSULIN PUMP  insulin aspart (NOVOLOG VIAL) 100 UNITS/ML vial, About 170 units with insulin pump  insulin glargine (BASAGLAR KWIKPEN) 100 UNIT/ML pen, Inject 32 units subcutaneous every 24 hours in the event of insulin pump failure. (Patient not taking: Reported on 5/5/2021)  lisinopril (ZESTRIL) 20 MG tablet, Take 1 tablet (20 mg) by mouth 2 times daily  lisinopril (ZESTRIL) 20 MG tablet, Take 1 tablet (20 mg) by mouth 2 times daily  metFORMIN  (GLUCOPHAGE) 1000 MG tablet, TAKE 1 TABLET TWICE DAILY  WITH MEALS  multivitamin, therapeutic with minerals (MULTI-VITAMIN) TABS, Take 1 tablet by mouth daily  omega 3 1000 MG CAPS, Take 3 capsules by mouth daily (Patient taking differently: Take 2 capsules by mouth daily )  sildenafil (VIAGRA) 100 MG tablet, Take 1 tablet (100 mg) by mouth daily as needed (erectile dysfunction, no not take with NTG products)  sildenafil (VIAGRA) 100 MG tablet, Take 1 tablet (100 mg) by mouth daily as needed (erectile dysfunction, do not take with NTG products)  simvastatin (ZOCOR) 40 MG tablet, TAKE 1 TABLET BY MOUTH AT BEDTIME    No current facility-administered medications on file prior to visit.      Past Medical History:   Diagnosis Date     Diabetic eye exam (H) 03/18/14     Hyperlipidemia LDL goal <100 10/31/2010     Type 1 diabetes, HbA1c goal < 7% (H) 10/31/2010       No past surgical history on file.    Social History     Tobacco Use     Smoking status: Never Smoker     Smokeless tobacco: Never Used   Substance Use Topics     Alcohol use: No     Comment: none     Drug use: No     ROS: A 12 point review of systems was negative other than noted above.     Exam:  BP (!) 169/84   Pulse 71   Temp 97.9  F (36.6  C) (Oral)   Wt 107.1 kg (236 lb 1.6 oz)   SpO2 97%   BMI 32.93 kg/m     Recheck /82    GENERAL APPEARANCE: alert and no distress  EYES: EOMI, no scleral icterus  HENT: mouth without ulcers or lesions  RESP: lungs clear to auscultation   CV: regular rhythm, normal rate, no rub  Extremities: trace-1+ edema around both ankles  SKIN: no rash on exposed surfaces  NEURO: mentation intact and speech normal  PSYCH: affect normal/bright    Results:    Lab on 08/13/2021   Component Date Value Ref Range Status     Sodium 08/13/2021 143  133 - 144 mmol/L Final     Potassium 08/13/2021 4.0  3.4 - 5.3 mmol/L Final     Chloride 08/13/2021 110* 94 - 109 mmol/L Final     Carbon Dioxide (CO2) 08/13/2021 28  20 - 32 mmol/L Final      Anion Gap 08/13/2021 5  3 - 14 mmol/L Final     Urea Nitrogen 08/13/2021 19  7 - 30 mg/dL Final     Creatinine 08/13/2021 1.03  0.66 - 1.25 mg/dL Final     Calcium 08/13/2021 9.3  8.5 - 10.1 mg/dL Final     Glucose 08/13/2021 85  70 - 99 mg/dL Final     Albumin 08/13/2021 3.2* 3.4 - 5.0 g/dL Final     Phosphorus 08/13/2021 3.2  2.5 - 4.5 mg/dL Final     GFR Estimate 08/13/2021 84  >60 mL/min/1.73m2 Final    As of July 11, 2021, eGFR is calculated by the CKD-EPI creatinine equation, without race adjustment. eGFR can be influenced by muscle mass, exercise, and diet. The reported eGFR is an estimation only and is only applicable if the renal function is stable.     WBC Count 08/13/2021 9.1  4.0 - 11.0 10e3/uL Final     RBC Count 08/13/2021 5.19  4.40 - 5.90 10e6/uL Final     Hemoglobin 08/13/2021 15.3  13.3 - 17.7 g/dL Final     Hematocrit 08/13/2021 45.0  40.0 - 53.0 % Final     MCV 08/13/2021 87  78 - 100 fL Final     MCH 08/13/2021 29.5  26.5 - 33.0 pg Final     MCHC 08/13/2021 34.0  31.5 - 36.5 g/dL Final     RDW 08/13/2021 12.9  10.0 - 15.0 % Final     Platelet Count 08/13/2021 350  150 - 450 10e3/uL Final     Total Protein Random Urine g/L 08/13/2021 3.60  g/L Final    The reference range has not been established for total protein in random urine samples.  The result should be integrated into the clinical context for interpretation.     Total Protein Urine g/gr Creatinine 08/13/2021 2.07* 0.00 - 0.20 g/g Cr Final     Creatinine Urine mg/dL 08/13/2021 174  mg/dL Final     Color Urine 08/13/2021 Yellow  Colorless, Straw, Light Yellow, Yellow Final     Appearance Urine 08/13/2021 Slightly Cloudy* Clear Final     Glucose Urine 08/13/2021 Negative  Negative mg/dL Final     Bilirubin Urine 08/13/2021 Negative  Negative Final     Ketones Urine 08/13/2021 Negative  Negative mg/dL Final     Specific Gravity Urine 08/13/2021 1.020  1.003 - 1.035 Final     Blood Urine 08/13/2021 Negative  Negative Final     pH Urine  08/13/2021 5.0  5.0 - 7.0 Final     Protein Albumin Urine 08/13/2021 >499* Negative mg/dL Final     Urobilinogen Urine 08/13/2021 Normal  Normal, 2.0 mg/dL Final     Nitrite Urine 08/13/2021 Negative  Negative Final     Leukocyte Esterase Urine 08/13/2021 Negative  Negative Final     Mucus Urine 08/13/2021 Present* None Seen /LPF Final     RBC Urine 08/13/2021 2  <=2 /HPF Final     WBC Urine 08/13/2021 1  <=5 /HPF Final     Hyaline Casts Urine 08/13/2021 3* <=2 /LPF Final

## 2021-08-13 NOTE — LETTER
8/13/2021       RE: Aldo Sharpe  [address] [facility] [address]     Dear Colleague,    Thank you for referring your patient, Aldo Sharpe, to the [facility] [place] at [facility]. Please see a copy of my visit note below.    August 13, 2021    Assessment/Plan:  CKD with proteinuria  Cr remains at baseline of ~1 but patient has developed new proteinuria up to 2 g/g Cr in past year despite remaining on ACEi. CKD likely in the setting of HTN and DM2 but these have remained well controlled over the past several years and level of proteinuria seems out of proportion.   Differential remains broad and will start with serological tests, consider biopsy if workup unrevealing.   Will start SGLT-2 inhibitor and consider GLP-1 agonist in future for added renoprotection as indicated    - Start Farxiga 5 mg daily, will repeat BMP in 2 weeks and up-titrate to 10 mg daily as tolerated  - Avoid nephrotoxins, NSAIDs    Orders Placed This Encounter   Procedures     US Renal Complete     Basic metabolic panel     Hepatitis C antibody     Hepatitis B Surface Antibody     Hepatitis B surface antigen     HIV Antigen Antibody Combo     Treponema Abs w Reflex to RPR and Titer     Protein electrophoresis random urine     Kappa and lambda light chain     Protein Immunofixation Serum     [employer]; anti-PLA2R antibody (Laboratory Miscellaneous Order)       HTN  - Continue amlodipine and lisinopril.   - Did have higher BP today which could also be contributing towards more proteinuria if BP is indeed uncontrolled. Advised patient to start monitoring BP regularly and keep a diary    Discussed with Dr Paul Rodrigez MD  Renal Fellow  [phone]    I was asked to see this patient by Dr. Rosenthal    HPI: Aldo Sharpe is a 51 year old male who presents for evaluation of proteinuria. PMH DM1 since 1985 without neuropathy or  retinopathy, HTN >20 yrs, HLD, ED on viagra.     Notes more swelling around ankles since May. Does have a chronically injured R ankle with intermittent swelling but this became more persistent and also started to involve the L ankle around Memorial day.  Denies SOB, chest pain, abdominal pain, fevers/chills/night sweats, heartburn, n/v/d. Feels empties bladder completely, no post-void dribbling, no hesitancy and stream is not weak. Does note maybe some foaminess and urgency on occasion. Has been hydrating well.    Wife is an  and gets regular eye exams, last about 4 months ago    Home BP: does not check at home, needs to get a cuff    - History of Hematuria: none  - Hx of UTIs: none  - Hx of stones: none  - Rashes/Joint pain: none  - Family hx of kidney disease: Mother did have CKD likely from HTN, not on dialysis. No h/o stones  - NSAID use: Does take Advil 200 mg x2 every 2 weeks or so for ankle pain for the past 2 years    No Known Allergies    acetone urine (KETOSTIX) test strip, Use one strip as needed to test urine for ketones  amLODIPine (NORVASC) 10 MG tablet, Take 1 tablet (10 mg) by mouth daily  amLODIPine (NORVASC) 5 MG tablet, TAKE 1 TABLET BY MOUTH EVERY DAY (Patient not taking: Reported on 5/5/2021)  aspirin 81 MG tablet, Take 1 tablet (81 mg) by mouth daily  B-D U/F insulin pen needle, Use one Syringe 4 times daily or as directed  CONTOUR NEXT TEST test strip, TEST BLOOD SUGAR 6 TO 10   TIMES A DAY OR AS DIRECTED  glucagon (GLUCAGON EMERGENCY) 1 MG kit, Inject 1 mg into the muscle once for 1 dose  insulin aspart (NOVOLOG VIAL) 100 UNITS/ML vial, INJECT UP TO  170 UNITS     DAILY VIA INSULIN PUMP  insulin aspart (NOVOLOG VIAL) 100 UNITS/ML vial, About 170 units with insulin pump  insulin glargine (BASAGLAR KWIKPEN) 100 UNIT/ML pen, Inject 32 units subcutaneous every 24 hours in the event of insulin pump failure. (Patient not taking: Reported on 5/5/2021)  lisinopril (ZESTRIL) 20 MG tablet, Take  1 tablet (20 mg) by mouth 2 times daily  lisinopril (ZESTRIL) 20 MG tablet, Take 1 tablet (20 mg) by mouth 2 times daily  metFORMIN (GLUCOPHAGE) 1000 MG tablet, TAKE 1 TABLET TWICE DAILY  WITH MEALS  multivitamin, therapeutic with minerals (MULTI-VITAMIN) TABS, Take 1 tablet by mouth daily  omega 3 1000 MG CAPS, Take 3 capsules by mouth daily (Patient taking differently: Take 2 capsules by mouth daily )  sildenafil (VIAGRA) 100 MG tablet, Take 1 tablet (100 mg) by mouth daily as needed (erectile dysfunction, no not take with NTG products)  sildenafil (VIAGRA) 100 MG tablet, Take 1 tablet (100 mg) by mouth daily as needed (erectile dysfunction, do not take with NTG products)  simvastatin (ZOCOR) 40 MG tablet, TAKE 1 TABLET BY MOUTH AT BEDTIME    No current facility-administered medications on file prior to visit.      Past Medical History:   Diagnosis Date     Diabetic eye exam (H) 03/18/14     Hyperlipidemia LDL goal <100 10/31/2010     Type 1 diabetes, HbA1c goal < 7% (H) 10/31/2010       No past surgical history on file.    Social History     Tobacco Use     Smoking status: Never Smoker     Smokeless tobacco: Never Used   Substance Use Topics     Alcohol use: No     Comment: none     Drug use: No     ROS: A 12 point review of systems was negative other than noted above.     Exam:  BP (!) 169/84   Pulse 71   Temp 97.9  F (36.6  C) (Oral)   Wt 107.1 kg (236 lb 1.6 oz)   SpO2 97%   BMI 32.93 kg/m     Recheck /82    GENERAL APPEARANCE: alert and no distress  EYES: EOMI, no scleral icterus  HENT: mouth without ulcers or lesions  RESP: lungs clear to auscultation   CV: regular rhythm, normal rate, no rub  Extremities: trace-1+ edema around both ankles  SKIN: no rash on exposed surfaces  NEURO: mentation intact and speech normal  PSYCH: affect normal/bright    Results:    Lab on 08/13/2021   Component Date Value Ref Range Status     Sodium 08/13/2021 143  133 - 144 mmol/L Final     Potassium 08/13/2021 4.0   3.4 - 5.3 mmol/L Final     Chloride 08/13/2021 110* 94 - 109 mmol/L Final     Carbon Dioxide (CO2) 08/13/2021 28  20 - 32 mmol/L Final     Anion Gap 08/13/2021 5  3 - 14 mmol/L Final     Urea Nitrogen 08/13/2021 19  7 - 30 mg/dL Final     Creatinine 08/13/2021 1.03  0.66 - 1.25 mg/dL Final     Calcium 08/13/2021 9.3  8.5 - 10.1 mg/dL Final     Glucose 08/13/2021 85  70 - 99 mg/dL Final     Albumin 08/13/2021 3.2* 3.4 - 5.0 g/dL Final     Phosphorus 08/13/2021 3.2  2.5 - 4.5 mg/dL Final     GFR Estimate 08/13/2021 84  >60 mL/min/1.73m2 Final    As of July 11, 2021, eGFR is calculated by the CKD-EPI creatinine equation, without race adjustment. eGFR can be influenced by muscle mass, exercise, and diet. The reported eGFR is an estimation only and is only applicable if the renal function is stable.     WBC Count 08/13/2021 9.1  4.0 - 11.0 10e3/uL Final     RBC Count 08/13/2021 5.19  4.40 - 5.90 10e6/uL Final     Hemoglobin 08/13/2021 15.3  13.3 - 17.7 g/dL Final     Hematocrit 08/13/2021 45.0  40.0 - 53.0 % Final     MCV 08/13/2021 87  78 - 100 fL Final     MCH 08/13/2021 29.5  26.5 - 33.0 pg Final     MCHC 08/13/2021 34.0  31.5 - 36.5 g/dL Final     RDW 08/13/2021 12.9  10.0 - 15.0 % Final     Platelet Count 08/13/2021 350  150 - 450 10e3/uL Final     Total Protein Random Urine g/L 08/13/2021 3.60  g/L Final    The reference range has not been established for total protein in random urine samples.  The result should be integrated into the clinical context for interpretation.     Total Protein Urine g/gr Creatinine 08/13/2021 2.07* 0.00 - 0.20 g/g Cr Final     Creatinine Urine mg/dL 08/13/2021 174  mg/dL Final     Color Urine 08/13/2021 Yellow  Colorless, Straw, Light Yellow, Yellow Final     Appearance Urine 08/13/2021 Slightly Cloudy* Clear Final     Glucose Urine 08/13/2021 Negative  Negative mg/dL Final     Bilirubin Urine 08/13/2021 Negative  Negative Final     Ketones Urine 08/13/2021 Negative  Negative mg/dL  Final     Specific Gravity Urine 08/13/2021 1.020  1.003 - 1.035 Final     Blood Urine 08/13/2021 Negative  Negative Final     pH Urine 08/13/2021 5.0  5.0 - 7.0 Final     Protein Albumin Urine 08/13/2021 >499* Negative mg/dL Final     Urobilinogen Urine 08/13/2021 Normal  Normal, 2.0 mg/dL Final     Nitrite Urine 08/13/2021 Negative  Negative Final     Leukocyte Esterase Urine 08/13/2021 Negative  Negative Final     Mucus Urine 08/13/2021 Present* None Seen /LPF Final     RBC Urine 08/13/2021 2  <=2 /HPF Final     WBC Urine 08/13/2021 1  <=5 /HPF Final     Hyaline Casts Urine 08/13/2021 3* <=2 /LPF Final       Again, thank you for allowing me to participate in the care of your patient.      Sincerely,    Bethany Rodrigez MD

## 2021-08-13 NOTE — TELEPHONE ENCOUNTER
Signed Today (8/13/2021):   sildenafil (VIAGRA) 100 MG tablet   Sig: Take 1 tablet (100 mg) by mouth daily as needed (erectile dysfunction, no not take with NTG products)   Disp:  18 tablet    Refills:  1   Signed by: Kary Miller MD   Encounter Details     JACINTA Kidd, RN  [facility]

## 2021-08-13 NOTE — PATIENT INSTRUCTIONS
Please keep a BP diary at home, would check BP twice a day    We will get bloodwork, urine tests and a kidney ultrasound    We will start a new medication called farxiga. We may have to switch this depending on if you insurance has a preference for another medication that works similarly

## 2021-08-22 DIAGNOSIS — E10.9 TYPE 1 DIABETES MELLITUS WITHOUT COMPLICATION (H): ICD-10-CM

## 2021-08-25 LAB
Lab: NORMAL
MAYO MISC RESULT: NORMAL
PERFORMING LABORATORY: NORMAL
SPECIMEN STATUS: NORMAL
TEST NAME: NORMAL

## 2021-08-25 NOTE — TELEPHONE ENCOUNTER
Patient has never established care with Dr. Oro.    Jaspreet JEFFREY RN....8/25/2021 10:09 AM

## 2021-08-31 ENCOUNTER — APPOINTMENT (OUTPATIENT)
Dept: CARDIOLOGY | Facility: CLINIC | Age: 51
End: 2021-08-31
Payer: COMMERCIAL

## 2021-08-31 ENCOUNTER — ALLIED HEALTH/NURSE VISIT (OUTPATIENT)
Dept: CARDIOLOGY | Facility: CLINIC | Age: 51
End: 2021-08-31

## 2021-08-31 DIAGNOSIS — Z00.6 EXAMINATION OF PARTICIPANT OR CONTROL IN CLINICAL RESEARCH: Primary | ICD-10-CM

## 2021-08-31 PROCEDURE — 99207 PR NO CHARGE NURSE ONLY: CPT

## 2021-08-31 NOTE — PROGRESS NOTES
Raptor Study Consent Visit    Study description:   Raw Pressure Data Collection with Prototypes. To collect blood pressure data from commercially available reference and prototype blood pressure study equipment for the development of algorithms measuring cardiovascular health.      Aldo Sharpe a 51 year old male , was contacted by today to discuss participation in the Raptor study.     The patient called the Clinical Trials Office to inquire about study participation.  The consent form was reviewed with the patient.     The review of the study included:    Study purpose     Conflict of interest    Device description      Study visits    Risks of participation    Benefits (if any)    Alternatives    Voluntary participation    Confidentiality     Compensation/costs of participation    Study stipends    Injury and legal rights    The subject was queried in regards to his willingness to continue and come in for scheduled appointment. his questions were answered to his satisfaction.   The patient has given his agreement to volunteer to participate in the above noted study.     Plan:   The consent form version 79She67 and HIPPA form version [id number] was signed 08/31/21 via [employer] e-consent. A copy of the [employer] e-consent  will be placed in subject's medical record.     he was instructed to eat as usual before coming for study visit and take medications as usual too. he was encouraged to wear comfortable clothes and shoes to the study appointment.     Have you been vaccinated for COVID? Yes      Past Medical History:   Diagnosis Date     Hyperlipidemia LDL goal <100 10/31/2010     Type 1 diabetes, HbA1c goal < 7% (H) 10/31/2010       Current Outpatient Medications:      amLODIPine (NORVASC) 10 MG tablet, Take 1 tablet (10 mg) by mouth daily, Disp: 90 tablet, Rfl: 3     amLODIPine (NORVASC) 5 MG tablet, TAKE 1 TABLET BY MOUTH EVERY DAY, Disp: 90 tablet, Rfl: 0      aspirin 81 MG tablet, Take 1 tablet (81 mg) by mouth  daily, Disp: 100 tablet, Rfl: 3     dapagliflozin (FARXIGA) 5 MG TABS tablet, Take 1 tablet (5 mg) by mouth daily, Disp: 90 tablet, Rfl: 3     glucagon (GLUCAGON EMERGENCY) 1 MG kit, Inject 1 mg into the muscle once for 1 dose, Disp: 1 each, Rfl: 0     insulin aspart (NOVOLOG VIAL) 100 UNITS/ML vial, INJECT UP TO  170 UNITS     DAILY VIA INSULIN PUMP, Disp: 160 mL, Rfl: 3     insulin aspart (NOVOLOG VIAL) 100 UNITS/ML vial, About 170 units with insulin pump, Disp: 16 vial, Rfl: 0     lisinopril (ZESTRIL) 20 MG tablet, Take 1 tablet (20 mg) by mouth 2 times daily, Disp: 180 tablet, Rfl: 0     metFORMIN (GLUCOPHAGE) 500 MG tablet, Take 500 mg by mouth 2 times daily (with meals), Disp: , Rfl:      sildenafil (VIAGRA) 100 MG tablet, Take 1 tablet (100 mg) by mouth daily as needed (erectile dysfunction, no not take with NTG products), Disp: 18 tablet, Rfl: 1     simvastatin (ZOCOR) 40 MG tablet, TAKE 1 TABLET BY MOUTH AT BEDTIME, Disp: 90 tablet, Rfl: 1   No Known Allergies       Niranjan Frank

## 2021-09-04 ENCOUNTER — HEALTH MAINTENANCE LETTER (OUTPATIENT)
Age: 51
End: 2021-09-04

## 2021-09-07 ENCOUNTER — OFFICE VISIT (OUTPATIENT)
Dept: CARDIOLOGY | Facility: CLINIC | Age: 51
End: 2021-09-07
Payer: COMMERCIAL

## 2021-09-07 ENCOUNTER — ALLIED HEALTH/NURSE VISIT (OUTPATIENT)
Dept: CARDIOLOGY | Facility: CLINIC | Age: 51
End: 2021-09-07

## 2021-09-07 DIAGNOSIS — Z00.6 EXAMINATION OF PARTICIPANT OR CONTROL IN CLINICAL RESEARCH: Primary | ICD-10-CM

## 2021-09-07 PROCEDURE — 99207 PR NO CHARGE NURSE ONLY: CPT

## 2021-09-07 PROCEDURE — 93005 ELECTROCARDIOGRAM TRACING: CPT

## 2021-09-07 NOTE — PROGRESS NOTES
Raptor Inclusion/Exclusion Criteria:     Study Name: [facility]   : John Gould MD    Protocol version: 2.0 dated 08Jun2021    Criteria #  Inclusion Criterita (ALL MUST BE YES)  YES/NO/N/A   1   At least 22 years old at the time of screening.  Yes   2   Able to read and understand the written informed consent form Yes   3   Willing and able to participate in the study procedures and comply with its     restrictions for the duration of the study.  Yes   4   Able to communicate effectively with and follow instructions from the study staff Yes}   5   Left and right upper arm circumference (UAC), measured at the midpoint between the armpit and inner elbow, is ? 25 cm and  ? 43 cm. Yes   6   Left and right wrist circumference is within 130 to 245 mm inclusive.  Yes   7   Left and right upper arm length, as measured by the armpit to the inner elbow, is the correct length to wear the BP cuff(s).     For those with UAC  ? 34 cm, upper arm length muse be ?  14.5 cm.     For those with UAC > 34 cm, upper arm length must be ?  17 cm.  Yes   8   If COVID-19 diagnosis is part of the medical history, subject must be >30 days and recovered from symptoms.  Yes   9   Cohort 2 only: Pregnant confirmed via physician's report with estimated due date to verify gestational age of 20-37 weeks.  Yes       Criteria # Exclusion Criteria (ALL MUST BE NO) YES/NO/N/A   1   Incapable or unwilling to have multiple BP measurements taken sequentially or simultaneously at the left and right upper arms No   2   Upper body electromagnetically conductive implantable (I.e. pacemaker, implantable cardioverter-defibrillator, etc.)  No   3   Known significant sensitivity or allergy to medical adhesives, isopropyl alcohol, or electrocardiogram (ECG) electrodes. No   4   A current or history of a condition which has theoretical or clinical evidence that might affect, interfere with, prevent, or is not suitable for blood pressure  measurements at upper arm. Such conditions include but are not limited to:     Significantly irregular heart rhythm as determined by the investigator, including: bigeminy, trigeminy, frequent (more than 3 per minute) ectopic beats, atrial fibrillation, atrial flutter.     Peripheral artery disease     Absence of palpable brachial artery pulses on the left or right arm     Difficulties in obtaining reliable auscultatory BP measurements (e.g. due to faint or unclear Korotkoff sounds, including K5).    Korotkoff sounds which are audible very close to 0 mmHg level or even after complete deflation of cuff during brachial BP measurement.     Arteriovenous fistula (such as used in hemodialysis) or any other intravenous acces device on either arm.     Lymphedema on either arm.     Axillary lymph node biopsy or resection, or a radical mastectomy.     Current rash, guaze/ adhesive dressings, casts, open sores, hematoma, trauma, edema, wounds, or deformation on either arm.     Current carpal tunnel syndrome or ulnar tunnel syndrome of either arm.     Chronic or acute pain affecting either arm or hand.  No   5   Subjects with any Medical History, Physical exam, vital sign or any other study procedure finding/ assessment that in the opinion of the PI or designee could compromise subject safety objectives.  No   6   Occupational exclusion: Subjects who work for a company that develops or sells medical/fitness devices (e.g. ECG monitors, wearable fitness bands, sleep monitors, etc.) or a technology focused media company (e.g. , professional bloggers, TV, magazine, newspaper reporters, etc.) will be excluded from the study.  No   7   Cohort 1 only:     Pregnant women    Average of 2nd and 3rd screening measurement blood pressure lateral difference of ? 16 mmHg systolic and ?  11 mmHg diastolic at the time of screening     Weight greater than ? 182 kilograms.  No   8   Cohort 2 only:     Condition at the time of screening:    o Screening SBP ? 141 mmHg or DBP ? 91 mmHg   o Cervical dilation   o Calculated BML ? 30 from pre-pregnancy weight (self-reported) and screening height   o Left or right UAC ? 32 cm     A current or history of a condition which has theoretical or clinical evidence that might place the current pregnancy at a higher risk. Such conditions include but are not limited to:   o Hypertension   o  birth   o  labor ( contractions plus cervical dilation)  o Bleeding in second or third trimester   o Medical complication in pregnancy   o Requiring delivery prior to 37 weeks      No   9   Subjects that meet any of the following criteria at the time of study screening:     Age ? 75    BMI ? 45 (Cohort 1)     Serious heart conditions, such as heart failure, coronary artery disease, or cardiomyopathies UNLESS fully vaccinated for COVID-19*    Any acute or chronic respiratory conditions or moderate to severe asthma UNLESS fully vaccinated for COVID-19*    Chronic lung or kidney disease UNLESS fully vaccinated for COVID-19*    Diagnosis of diabetes mellitus UNLESS fully vaccinated for COVID-19*    Diagnosis of cancer UNLESS fully vaccinated for COVID-19* AND meet one of the following criteria  o Previously treated solid cancers (>12 mo ago) in remission that are not actively being treated  o Non-metastatic prostate cancer not currently undergoing treatment  o Squamous or basal cell carcinoma      Neither subject, nor any individuals living with subject, have had new development in the following within the last 14 days prior to study screening:     Sickle cell disease     Taking any medication(s) that could lead to a compromise immune system     Any medical condition that leads to a compromised immune system     Neither subject, nor any individuals living with subject, have had new development in the following within the last 14 days prior to study screening:   o Traveled outside of country of study site    o Have had any unexpected flu-like symptoms (such as fever, chills, cough, shortness of breath, diarrhea, sore throat, runny nose, or trouble breathing)   o Have had any contact with people with confirmed COVID-19   o Have been confirmed to have COVID-19 and have no subsequently received a negative COVID-19 test result.     *Fully vaccinated is defined as ? 2 weeks after a final COVID-19 vaccine dose No     Patient fulfills study inclusion criteria and no exclusion criteria are found.     John Gould MD

## 2021-09-07 NOTE — PROGRESS NOTES
Raptor Study    Physical Examination  For abnormal findings, please evaluate if the finding is Clinically Significant (by 'CS') or Not Clinically Significant (by 'NCS')  General Appearance   Normal  Head and Neck   Normal  Lungs     Normal  Cardiovascular   Normal  Abdomen    Normal  Musculoskeletal/Extremities  Abnormal- 1+ edema bilateral lower legs; NCS   Lymph Nodes    Normal  Skin     Normal  Neurological    Normal    Tremor     Absent         If present, document.       COVID: No symptoms, chills, shortness of breath, or difficulty breathing, muscle or body aches, headache, loss of taste or smell, sore throat, runny nose, congestion, nausea, vomiting or diarrhea according to the [place] Department of Health and Human Services based on the CARES Act.       Leesa Franks PA-C

## 2021-09-07 NOTE — PROGRESS NOTES
Raptor Screening    Purpose: Raw Pressure Data Collection with Prototypes. To collect blood pressure data from commercially available reference and prototype blood pressure study equipment for the development of algorithms measuring cardiovascular health.        Aldo Sharpe a 51 year old male , was seen at [facility] today to discuss participation in the Raptor study.   The e-consent was signed on 08/31/21.  Please refer to phone call note from Niranjan Frank for more details.    A copy of the e-consent form was give to the subject today, along with COVID-19 and Research Participation guidelines from the [facility].    A copy of the signed forms was saved in the EMR.    Study data is directly entered into [employer] per protocol.    No study procedures were done prior to Aldo Sharpe providing informed consent.     Erinn Taylor

## 2021-09-09 ENCOUNTER — VIRTUAL VISIT (OUTPATIENT)
Dept: ENDOCRINOLOGY | Facility: CLINIC | Age: 51
End: 2021-09-09
Payer: COMMERCIAL

## 2021-09-09 DIAGNOSIS — E10.21 TYPE 1 DIABETES MELLITUS WITH DIABETIC NEPHROPATHY (H): Primary | ICD-10-CM

## 2021-09-09 DIAGNOSIS — E03.8 SUBCLINICAL HYPOTHYROIDISM: ICD-10-CM

## 2021-09-09 DIAGNOSIS — Z96.41 INSULIN PUMP STATUS: ICD-10-CM

## 2021-09-09 PROCEDURE — 99214 OFFICE O/P EST MOD 30 MIN: CPT | Mod: 95 | Performed by: INTERNAL MEDICINE

## 2021-09-09 NOTE — LETTER
9/9/2021         RE: Aldo Sharpe  [address] [place] [id number]        Dear Colleague,    Thank you for referring your patient, Aldo Sharpe, to the [facility]. Please see a copy of my visit note below.    Patient is being evaluated via a billable video visit.      How would you like to obtain your AVS? Reviewed verbally  If the video visit is dropped, the invitation should be resent by cell phone  Will anyone else be joining your video visit? no      Video Start Time:  1:40 pm    Video-Visit Details    Type of service:  Video Visit    Video End Time:  2:00 pm    Originating Location (pt. Location): home    Distant Location (provider location):  [facility]/home    Platform used for Video Visit:  [employer]        Recent issues:  Diabetes follow-up evaluation  Has seen [facility] nephrologist to assist with proteinuria  Patient continues with the Medtronic 670G pump and Guardian3 sensor, in auto mode  Reviewed medical history from patient and Epic chart record         1985. Diagnosis of diabetes mellitus age 15, living in [facility]  Symptoms of increased thirst, feeling unwell  Medical evaluation and lab testing showed high glucose level  Began treatment with diabetes pills, then transitioned to insulin 1-1.5 yrs later  Had taken Lantus, Humalog insulins  Recalls problems with armando phenomenon  Hospitalization for DKA: none  1/2019. Switched to Medtronic insulin pump     Previous [facility] hgbA1c trends include:     Lab Test 08/26/20  0854 01/07/20  1721 10/09/19  1231 06/20/19  0925 03/20/19  1333   A1C 6.7* 6.6* 6.9* 6.8* 7.0*     Using the Medtronic 670G pump and also metformin, uses Guardian3 sensor regularly      5/5/21. Initial diabetes evaluation with me at [facility]  Reviewed health history and diabetes management  Initial labs included high UCR, then 24-hr urine showing proteinuria up to 2 g/g Cr    8/2021. Nephrology evaluations with Dr. Sims  [facility]  Started low sodium diet, also Farxiga 5 mg daily, dose reduction metformin as 500 mg BID  Purchased home BP monitor, also started participation with [employer] study that includes use of Apple Watch  Patient feeling better overall, better energy and some wt loss    Using the Medtronic 670G pump with Guardian3 sensor  Current pump brand:   Novolog in pump   Metformin 500 mg morning and evening    Blood glucose (BG) meter:  Contour Next, also the Livongo meter   Tests up to 8x/day    Previous pump settings:      Recent Medtronic Carelink data:  None available    Recent [facility] labs include:  Lab Results   Component Value Date    A1C 6.3 (H) 05/28/2021     08/13/2021    POTASSIUM 4.0 08/13/2021    CHLORIDE 110 (H) 08/13/2021    CO2 28 08/13/2021    ANIONGAP 5 08/13/2021    GLC 85 08/13/2021    BUN 19 08/13/2021    CR 1.03 08/13/2021    GFRESTIMATED 84 08/13/2021    GFRESTBLACK >90 05/28/2021    MAGGIE 9.3 08/13/2021    CPEPT <0.1 (L) 05/28/2021    CHOL 145 05/28/2021    TRIG 90 05/28/2021    HDL 47 05/28/2021    LDL 80 05/28/2021    NHDL 98 05/28/2021    UCRR 174 08/13/2021    MICROL 1,260 05/28/2021    UMALCR 1,792.32 (H) 05/28/2021    TSH 5.46 (H) 08/26/2020    T4 1.08 08/26/2020     Last eye exam 12/2020, no DR  DM Complications:     Nephropathy:    proteinuria      Lives in [place], [place], works as [employer]   Sees Dr. Kaleb Rosenthal/[facility] for general medicine evaluations.    PMH/PSH:  Past Medical History:   Diagnosis Date     Diabetic eye exam (H) 03/18/14     Hyperlipidemia LDL goal <100 10/31/2010     Type 1 diabetes, HbA1c goal < 7% (H) 10/31/2010     No past surgical history on file.    Family Hx:  No family history on file.      Social Hx:  Social History     Socioeconomic History     Marital status:      Spouse name: Not on file     Number of children: Not on file     Years of education: Not on file     Highest education level: Not on file   Occupational  History     Not on file   Tobacco Use     Smoking status: Never Smoker     Smokeless tobacco: Never Used   Substance and Sexual Activity     Alcohol use: No     Comment: none     Drug use: No     Sexual activity: Yes     Partners: Female   Other Topics Concern     Parent/sibling w/ CABG, MI or angioplasty before 65F 55M? Not Asked   Social History Narrative     Not on file     Social Determinants of Health     Financial Resource Strain:      Difficulty of Paying Living Expenses:    Food Insecurity:      Worried About Running Out of Food in the Last Year:      Ran Out of Food in the Last Year:    Transportation Needs:      Lack of Transportation (Medical):      Lack of Transportation (Non-Medical):    Physical Activity:      Days of Exercise per Week:      Minutes of Exercise per Session:    Stress:      Feeling of Stress :    Social Connections:      Frequency of Communication with Friends and Family:      Frequency of Social Gatherings with Friends and Family:      Attends Gnosticism Services:      Active Member of Clubs or Organizations:      Attends Club or Organization Meetings:      Marital Status:    Intimate Partner Violence:      Fear of Current or Ex-Partner:      Emotionally Abused:      Physically Abused:      Sexually Abused:           MEDICATIONS:  has a current medication list which includes the following prescription(s): amlodipine, amlodipine, aspirin, contour next test, dapagliflozin, insulin aspart, lisinopril, metformin, omega 3, sildenafil, simvastatin, acetone urine, b-d u/f, glucagon emergency, insulin aspart, insulin glargine, and multivitamin w/minerals.    ROS:     ROS: 10 point ROS neg other than the symptoms noted above in the HPI.    GENERAL: mild fatigue, wt loss; denies fevers, chills, night sweats.   HEENT: no dysphagia, odonophagia, diplopia, neck pain  THYROID:  no apparent hyper or hypothyroid symptoms  CV: no chest pain, pressure, palpitations  LUNGS: no SOB, MCMANUS, cough, wheezing    ABDOMEN: some bloating; no diarrhea, constipation, abdominal pain  EXTREMITIES: no rashes, ulcers, edema  NEUROLOGY: no headaches, denies changes in vision, tingling, extremitiy numbness   MSK:  Right ankle/foot pains; denies muscle weakness  SKIN: no rashes or lesions  : some decreased erection function, uses sildenafil  PSYCH:  stable mood, no significant anxiety or depression  ENDOCRINE: no heat or cold intolerance    Physical Exam (visual exam)  VS:  no vital signs taken for video visit  CONSTITUTIONAL: healthy, alert and NAD, well dressed, answering questions appropriately  ENT: no nose swelling or nasal discharge, mouth redness or gum changes.  EYES: eyes grossly normal to inspection, conjunctivae and sclerae normal, no exophthalmos or proptosis  THYROID:  no apparent nodules or goiter  LUNGS: no audible wheeze, cough or visible cyanosis, no visible retractions or increased work of breathing  ABDOMEN: abdomen not evaluated  EXTREMITIES: no hand tremors, limited exam  NEUROLOGY: CN grossly intact, mentation intact and speech normal   SKIN:  no apparent skin lesions, rash, or edema with visualized skin appearance  PSYCH: mentation appears normal, affect normal/bright, judgement and insight intact,   normal speech and appearance well groomed      LABS:    All pertinent notes, labs, and images personally reviewed by me.     A/P:  Encounter Diagnoses   Name Primary?     Type 1 diabetes mellitus with diabetic nephropathy (H) Yes     Insulin pump status      Subclinical hypothyroidism        Comments:  Reviewed health history and diabetes issues.  Difficult to assess glycemic control without recent glucose trend information  Reviewed and interpreted tests that I previously ordered.   Ordered appropriate tests for the endocrinology disease management.    Management options discussed and implemented after shared medical decision making with the patient.  The T1DM problem is chronic-stable    Plan:  Reviewed the  overall T1DM management and insulin pump use.  Discussed optimal BG testing to assess glucose trends.  We reviewed insulin pump settings, basal rate and bolus dosing  Use of automated pump bolus dosing for meal/snack carb & correction dosing  Reviewed the value of reviewing the Medtronic Carelink report datal.    Recommend:  Continue the current Medtronic insulin pump and Guardian3 sensor diabetes management plan  Reasonable to continue lower dose metformin medication   No pump setting changes at this time    Would be helpful if patient could upload his Medtronic pump at home, prior to appointments  Use sensor in auto-mode consistently  Lab testing:   No lab tests ordered at this time   Future labs including hgbA1c, TSH Reflex, TPOAb  Keep focus on diet, exercise, and weight management  Continue the BP and lipid medication plan  Need close monitoring of kidney function, eGFR, BP  Consider future diabetes education appointment to review pump upload process, pump data and settings  Arrange annual dilated eye exam, fasting lipid panel testing.    Keep follow-up nephrology appointments to assist with kidney function, antihypertensive management.  Addressed patient's questions today.    There are no Patient Instructions on file for this visit.    Future labs ordered today:   Orders Placed This Encounter   Procedures     TSH with free T4 reflex     Thyroid peroxidase antibody     Hemoglobin A1c     Radiology/Consults ordered today: None    Total time spent in with the patient evaluation:  20 min  Additional time spent reviewing pertinent lab tests and chart notes, and documentation:  10 min    Follow-up:  12/2021  NIRALI Olivares MD, MS  Endocrinology  [facility]    CC: KEELEY Rosenthal and KARINA Rodrigez              Again, thank you for allowing me to participate in the care of your patient.        Sincerely,        Scar Olivares MD

## 2021-09-13 ENCOUNTER — TRANSFERRED RECORDS (OUTPATIENT)
Dept: CARDIOLOGY | Facility: CLINIC | Age: 51
End: 2021-09-13

## 2021-09-13 DIAGNOSIS — Z00.6 EXAMINATION OF PARTICIPANT IN CLINICAL TRIAL: Primary | ICD-10-CM

## 2021-09-28 DIAGNOSIS — E10.9 TYPE 1 DIABETES MELLITUS WITHOUT COMPLICATION (H): ICD-10-CM

## 2021-09-28 DIAGNOSIS — I10 ESSENTIAL HYPERTENSION, BENIGN: ICD-10-CM

## 2021-09-29 RX ORDER — AMLODIPINE BESYLATE 10 MG/1
TABLET ORAL
Qty: 90 TABLET | Refills: 3 | OUTPATIENT
Start: 2021-09-29

## 2021-10-06 DIAGNOSIS — E10.22 CKD STAGE 2 DUE TO TYPE 1 DIABETES MELLITUS (H): Primary | ICD-10-CM

## 2021-10-06 DIAGNOSIS — N18.2 CKD STAGE 2 DUE TO TYPE 1 DIABETES MELLITUS (H): Primary | ICD-10-CM

## 2021-10-08 ENCOUNTER — ANCILLARY PROCEDURE (OUTPATIENT)
Dept: ULTRASOUND IMAGING | Facility: CLINIC | Age: 51
End: 2021-10-08
Attending: INTERNAL MEDICINE
Payer: COMMERCIAL

## 2021-10-08 ENCOUNTER — LAB (OUTPATIENT)
Dept: LAB | Facility: CLINIC | Age: 51
End: 2021-10-08

## 2021-10-08 ENCOUNTER — OFFICE VISIT (OUTPATIENT)
Dept: NEPHROLOGY | Facility: CLINIC | Age: 51
End: 2021-10-08
Attending: INTERNAL MEDICINE
Payer: COMMERCIAL

## 2021-10-08 ENCOUNTER — LAB (OUTPATIENT)
Dept: LAB | Facility: CLINIC | Age: 51
End: 2021-10-08
Attending: INTERNAL MEDICINE
Payer: COMMERCIAL

## 2021-10-08 VITALS
HEART RATE: 66 BPM | SYSTOLIC BLOOD PRESSURE: 129 MMHG | DIASTOLIC BLOOD PRESSURE: 78 MMHG | WEIGHT: 224.9 LBS | OXYGEN SATURATION: 97 % | BODY MASS INDEX: 31.37 KG/M2

## 2021-10-08 DIAGNOSIS — R80.9 PROTEINURIA, UNSPECIFIED TYPE: ICD-10-CM

## 2021-10-08 DIAGNOSIS — M79.671 CHRONIC FOOT PAIN, RIGHT: Primary | ICD-10-CM

## 2021-10-08 DIAGNOSIS — E10.22 CKD STAGE 2 DUE TO TYPE 1 DIABETES MELLITUS (H): ICD-10-CM

## 2021-10-08 DIAGNOSIS — G89.29 CHRONIC FOOT PAIN, RIGHT: Primary | ICD-10-CM

## 2021-10-08 DIAGNOSIS — N18.2 CKD STAGE 2 DUE TO TYPE 1 DIABETES MELLITUS (H): ICD-10-CM

## 2021-10-08 DIAGNOSIS — I10 ESSENTIAL HYPERTENSION, BENIGN: ICD-10-CM

## 2021-10-08 DIAGNOSIS — E03.8 SUBCLINICAL HYPOTHYROIDISM: ICD-10-CM

## 2021-10-08 DIAGNOSIS — E10.21 TYPE 1 DIABETES MELLITUS WITH DIABETIC NEPHROPATHY (H): ICD-10-CM

## 2021-10-08 LAB
ALBUMIN SERPL-MCNC: 3.3 G/DL (ref 3.4–5)
ALBUMIN UR-MCNC: 30 MG/DL
ANION GAP SERPL CALCULATED.3IONS-SCNC: 7 MMOL/L (ref 3–14)
APPEARANCE UR: CLEAR
BILIRUB UR QL STRIP: NEGATIVE
BUN SERPL-MCNC: 22 MG/DL (ref 7–30)
CALCIUM SERPL-MCNC: 9.3 MG/DL (ref 8.5–10.1)
CHLORIDE BLD-SCNC: 101 MMOL/L (ref 94–109)
CO2 SERPL-SCNC: 29 MMOL/L (ref 20–32)
COLOR UR AUTO: ABNORMAL
CREAT SERPL-MCNC: 1.19 MG/DL (ref 0.66–1.25)
CREAT UR-MCNC: 56 MG/DL
ERYTHROCYTE [DISTWIDTH] IN BLOOD BY AUTOMATED COUNT: 12.7 % (ref 10–15)
GFR SERPL CREATININE-BSD FRML MDRD: 71 ML/MIN/1.73M2
GLUCOSE BLD-MCNC: 184 MG/DL (ref 70–99)
GLUCOSE UR STRIP-MCNC: >499 MG/DL
HBA1C MFR BLD: 6.3 % (ref 0–5.6)
HBV SURFACE AB SERPL IA-ACNC: 0.09 M[IU]/ML
HBV SURFACE AG SERPL QL IA: NONREACTIVE
HCT VFR BLD AUTO: 45.4 % (ref 40–53)
HCV AB SERPL QL IA: NONREACTIVE
HGB BLD-MCNC: 15.3 G/DL (ref 13.3–17.7)
HGB UR QL STRIP: NEGATIVE
HIV 1+2 AB+HIV1 P24 AG SERPL QL IA: NONREACTIVE
KETONES UR STRIP-MCNC: NEGATIVE MG/DL
LEUKOCYTE ESTERASE UR QL STRIP: NEGATIVE
MCH RBC QN AUTO: 29.5 PG (ref 26.5–33)
MCHC RBC AUTO-ENTMCNC: 33.7 G/DL (ref 31.5–36.5)
MCV RBC AUTO: 88 FL (ref 78–100)
NITRATE UR QL: NEGATIVE
PH UR STRIP: 6 [PH] (ref 5–7)
PHOSPHATE SERPL-MCNC: 3.8 MG/DL (ref 2.5–4.5)
PLATELET # BLD AUTO: 375 10E3/UL (ref 150–450)
POTASSIUM BLD-SCNC: 4.6 MMOL/L (ref 3.4–5.3)
PROT UR-MCNC: 0.54 G/L
PROT/CREAT 24H UR: 0.96 G/G CR (ref 0–0.2)
RBC # BLD AUTO: 5.19 10E6/UL (ref 4.4–5.9)
RBC URINE: <1 /HPF
SODIUM SERPL-SCNC: 137 MMOL/L (ref 133–144)
SP GR UR STRIP: 1.02 (ref 1–1.03)
T PALLIDUM AB SER QL: NONREACTIVE
THYROPEROXIDASE AB SERPL-ACNC: 53 IU/ML
TOTAL PROTEIN SERUM FOR ELP: 6.9 G/DL (ref 6.8–8.8)
TSH SERPL DL<=0.005 MIU/L-ACNC: 3.23 MU/L (ref 0.4–4)
UROBILINOGEN UR STRIP-MCNC: NORMAL MG/DL
WBC # BLD AUTO: 7.3 10E3/UL (ref 4–11)
WBC URINE: <1 /HPF

## 2021-10-08 PROCEDURE — 87340 HEPATITIS B SURFACE AG IA: CPT | Mod: 90 | Performed by: PATHOLOGY

## 2021-10-08 PROCEDURE — 87389 HIV-1 AG W/HIV-1&-2 AB AG IA: CPT | Mod: 90 | Performed by: PATHOLOGY

## 2021-10-08 PROCEDURE — 86780 TREPONEMA PALLIDUM: CPT | Mod: 90 | Performed by: PATHOLOGY

## 2021-10-08 PROCEDURE — 84166 PROTEIN E-PHORESIS/URINE/CSF: CPT | Mod: 26 | Performed by: PATHOLOGY

## 2021-10-08 PROCEDURE — 84443 ASSAY THYROID STIM HORMONE: CPT | Performed by: PATHOLOGY

## 2021-10-08 PROCEDURE — 86706 HEP B SURFACE ANTIBODY: CPT | Mod: 90 | Performed by: PATHOLOGY

## 2021-10-08 PROCEDURE — 83883 ASSAY NEPHELOMETRY NOT SPEC: CPT | Mod: 90 | Performed by: PATHOLOGY

## 2021-10-08 PROCEDURE — 84156 ASSAY OF PROTEIN URINE: CPT | Performed by: PATHOLOGY

## 2021-10-08 PROCEDURE — 86376 MICROSOMAL ANTIBODY EACH: CPT | Mod: 90 | Performed by: PATHOLOGY

## 2021-10-08 PROCEDURE — 86334 IMMUNOFIX E-PHORESIS SERUM: CPT | Mod: 26 | Performed by: PATHOLOGY

## 2021-10-08 PROCEDURE — 84155 ASSAY OF PROTEIN SERUM: CPT | Mod: 90 | Performed by: PATHOLOGY

## 2021-10-08 PROCEDURE — 83036 HEMOGLOBIN GLYCOSYLATED A1C: CPT | Performed by: PATHOLOGY

## 2021-10-08 PROCEDURE — 81001 URINALYSIS AUTO W/SCOPE: CPT | Performed by: PATHOLOGY

## 2021-10-08 PROCEDURE — 76770 US EXAM ABDO BACK WALL COMP: CPT | Performed by: STUDENT IN AN ORGANIZED HEALTH CARE EDUCATION/TRAINING PROGRAM

## 2021-10-08 PROCEDURE — 86803 HEPATITIS C AB TEST: CPT | Mod: 90 | Performed by: PATHOLOGY

## 2021-10-08 PROCEDURE — 84165 PROTEIN E-PHORESIS SERUM: CPT | Mod: 26 | Performed by: PATHOLOGY

## 2021-10-08 PROCEDURE — 80069 RENAL FUNCTION PANEL: CPT | Performed by: PATHOLOGY

## 2021-10-08 PROCEDURE — 36415 COLL VENOUS BLD VENIPUNCTURE: CPT | Performed by: PATHOLOGY

## 2021-10-08 PROCEDURE — 99000 SPECIMEN HANDLING OFFICE-LAB: CPT | Performed by: PATHOLOGY

## 2021-10-08 PROCEDURE — 85027 COMPLETE CBC AUTOMATED: CPT | Performed by: PATHOLOGY

## 2021-10-08 PROCEDURE — 99214 OFFICE O/P EST MOD 30 MIN: CPT | Mod: GC | Performed by: INTERNAL MEDICINE

## 2021-10-08 RX ORDER — AMLODIPINE BESYLATE 5 MG/1
5 TABLET ORAL AT BEDTIME
Qty: 90 TABLET | Refills: 0 | COMMUNITY
Start: 2021-10-08 | End: 2021-10-28

## 2021-10-08 ASSESSMENT — PAIN SCALES - GENERAL: PAINLEVEL: NO PAIN (0)

## 2021-10-08 NOTE — NURSING NOTE
Chief Complaint   Patient presents with     RECHECK     2 month f/u     Blood pressure 129/78, pulse 66, weight 102 kg (224 lb 14.4 oz), SpO2 97 %.    Danielle Keane, CMA

## 2021-10-08 NOTE — LETTER
10/8/2021       RE: Aldo Sharpe  [address] [place] [phone]     Dear Colleague,    Thank you for referring your patient, Aldo Sharpe, to the [facility] [employer] [facility]. Please see a copy of my visit note below.    October 8, 2021    Assessment/Plan:  CKD1 with proteinuria  T1DM  Cr somewhat higher since initiation of SGLT-2 inhibitor but in expected range and UPCR is significantly better. Ideally would continue to titrate dose up but patient is now euvolemic with controlled BP and given Cr elevation would want to ensure stability first. Will continue to follow closely and consider titrating dose further at follow-up.  PLA2R negative but other serologic workup has not been completed and advised patient to schedule appointment with lab to complete this. Renal US was normal.  Continue ACE inhibitor    R foot pain  Will refer to podiatry    Discussed with Dr Amanda Rodrigez MD  Renal Fellow  [phone]    HPI: Aldo Sharpe is a 51 year old male who presents for follow-up. PMH includes DM1 since 1985 without neuropathy or retinopathy, HTN >20 yrs, HLD, ED on viagra.     Notes he feels a lot better. Leg swelling has resolved, and notes BP is now consistently normal. Has less abdominal bloating. Has cut down on salt intake. Has been tolerating initiation of SGLT-2 inhibitor well. No symptoms of orthostasis, dysuria, fevers/chills, chest pain, abdominal pain. Has not taken Advil in the past few months, has only taken tylenol for ongoing pain in R foot which remains his main complaint. This has been going on for about 5 months and is primarily provoked by walking on steps or up an incline, not on level ground. Patient works as a  though and symptoms can become significantly limiting.    Home BP: 120/80    No Known Allergies    amLODIPine (NORVASC) 10 MG tablet, Take 1 tablet (10 mg) by mouth  daily  amLODIPine (NORVASC) 5 MG tablet, TAKE 1 TABLET BY MOUTH EVERY DAY  aspirin 81 MG tablet, Take 1 tablet (81 mg) by mouth daily  B-D U/F insulin pen needle, Use one Syringe 4 times daily or as directed  CONTOUR NEXT TEST test strip, Use to test blood sugar 8 times daily or as directed.  dapagliflozin (FARXIGA) 5 MG TABS tablet, Take 1 tablet (5 mg) by mouth daily  insulin aspart (NOVOLOG VIAL) 100 UNITS/ML vial, INJECT UP TO  170 UNITS     DAILY VIA INSULIN PUMP  insulin aspart (NOVOLOG VIAL) 100 UNITS/ML vial, About 170 units with insulin pump  insulin glargine (BASAGLAR KWIKPEN) 100 UNIT/ML pen, Inject 32 units subcutaneous every 24 hours in the event of insulin pump failure.  lisinopril (ZESTRIL) 20 MG tablet, Take 1 tablet (20 mg) by mouth 2 times daily  metFORMIN (GLUCOPHAGE) 500 MG tablet, Take 500 mg by mouth 2 times daily (with meals)  multivitamin, therapeutic with minerals (MULTI-VITAMIN) TABS, Take 1 tablet by mouth daily  omega 3 1000 MG CAPS, Take 3 capsules by mouth daily (Patient taking differently: Take 2 capsules by mouth daily )  sildenafil (VIAGRA) 100 MG tablet, Take 1 tablet (100 mg) by mouth daily as needed (erectile dysfunction, no not take with NTG products)  simvastatin (ZOCOR) 40 MG tablet, TAKE 1 TABLET BY MOUTH AT BEDTIME  acetone urine (KETOSTIX) test strip, Use one strip as needed to test urine for ketones (Patient not taking: Reported on 8/13/2021)  glucagon (GLUCAGON EMERGENCY) 1 MG kit, Inject 1 mg into the muscle once for 1 dose    No current facility-administered medications on file prior to visit.      Past Medical History:   Diagnosis Date     Diabetic eye exam (H) 03/18/14     Hyperlipidemia LDL goal <100 10/31/2010     Type 1 diabetes, HbA1c goal < 7% (H) 10/31/2010       No past surgical history on file.    Social History     Tobacco Use     Smoking status: Never Smoker     Smokeless tobacco: Never Used   Substance Use Topics     Alcohol use: No     Comment: none      Drug use: No       No family history on file.    ROS: A 12 point review of systems was negative other than noted above.     Exam:  /78   Pulse 66   Wt 102 kg (224 lb 14.4 oz)   SpO2 97%   BMI 31.37 kg/m      GENERAL APPEARANCE: alert and no distress  EYES: EOMI, no scleral icterus  HENT: mouth without ulcers or lesions  NECK: supple, no goiter  RESP: lungs clear to auscultation   CV: regular rhythm, normal rate, no rub  Extremities: no edema  GI: soft, non-tender, nondistended  SKIN: no rash on exposed surfaces  NEURO: mentation intact and speech normal  PSYCH: affect normal/bright    Results:    Lab on 10/08/2021   Component Date Value Ref Range Status     Color Urine 10/08/2021 Straw  Colorless, Straw, Light Yellow, Yellow Final     Appearance Urine 10/08/2021 Clear  Clear Final     Glucose Urine 10/08/2021 >499* Negative mg/dL Final     Bilirubin Urine 10/08/2021 Negative  Negative Final     Ketones Urine 10/08/2021 Negative  Negative mg/dL Final     Specific Gravity Urine 10/08/2021 1.018  1.003 - 1.035 Final     Blood Urine 10/08/2021 Negative  Negative Final     pH Urine 10/08/2021 6.0  5.0 - 7.0 Final     Protein Albumin Urine 10/08/2021 30 * Negative mg/dL Final     Urobilinogen Urine 10/08/2021 Normal  Normal, 2.0 mg/dL Final     Nitrite Urine 10/08/2021 Negative  Negative Final     Leukocyte Esterase Urine 10/08/2021 Negative  Negative Final     RBC Urine 10/08/2021 <1  <=2 /HPF Final     WBC Urine 10/08/2021 <1  <=5 /HPF Final     Total Protein Random Urine g/L 10/08/2021 0.54  g/L Final    The reference range has not been established for total protein in random urine samples.  The result should be integrated into the clinical context for interpretation.     Total Protein Urine g/gr Creatinine 10/08/2021 0.96* 0.00 - 0.20 g/g Cr Final     Creatinine Urine mg/dL 10/08/2021 56  mg/dL Final     WBC Count 10/08/2021 7.3  4.0 - 11.0 10e3/uL Final     RBC Count 10/08/2021 5.19  4.40 - 5.90 10e6/uL  Final     Hemoglobin 10/08/2021 15.3  13.3 - 17.7 g/dL Final     Hematocrit 10/08/2021 45.4  40.0 - 53.0 % Final     MCV 10/08/2021 88  78 - 100 fL Final     MCH 10/08/2021 29.5  26.5 - 33.0 pg Final     MCHC 10/08/2021 33.7  31.5 - 36.5 g/dL Final     RDW 10/08/2021 12.7  10.0 - 15.0 % Final     Platelet Count 10/08/2021 375  150 - 450 10e3/uL Final     Sodium 10/08/2021 137  133 - 144 mmol/L Final     Potassium 10/08/2021 4.6  3.4 - 5.3 mmol/L Final     Chloride 10/08/2021 101  94 - 109 mmol/L Final     Carbon Dioxide (CO2) 10/08/2021 29  20 - 32 mmol/L Final     Anion Gap 10/08/2021 7  3 - 14 mmol/L Final     Urea Nitrogen 10/08/2021 22  7 - 30 mg/dL Final     Creatinine 10/08/2021 1.19  0.66 - 1.25 mg/dL Final     Calcium 10/08/2021 9.3  8.5 - 10.1 mg/dL Final     Glucose 10/08/2021 184* 70 - 99 mg/dL Final     Albumin 10/08/2021 3.3* 3.4 - 5.0 g/dL Final     Phosphorus 10/08/2021 3.8  2.5 - 4.5 mg/dL Final     GFR Estimate 10/08/2021 71  >60 mL/min/1.73m2 Final    As of July 11, 2021, eGFR is calculated by the CKD-EPI creatinine equation, without race adjustment. eGFR can be influenced by muscle mass, exercise, and diet. The reported eGFR is an estimation only and is only applicable if the renal function is stable.     Hemoglobin A1C 10/08/2021 6.3* 0.0 - 5.6 % Final    Normal <5.7%   Prediabetes 5.7-6.4%    Diabetes 6.5% or higher     Note: Adopted from ADA consensus guidelines.     TSH 10/08/2021 3.23  0.40 - 4.00 mU/L Final         Attestation signed by Avani Patel MD at 12/1/2021  9:23 AM:  I have seen and evaluated the patient. Discussed with the fellow and agree with fellow's findings and plan as documented in the fellow's note.  I have reviewed today's vital signs, notes, medications, labs and imaging.   Avani Patel MD, MD          Again, thank you for allowing me to participate in the care of your patient.      Sincerely,    Bethany Rodrigez MD

## 2021-10-08 NOTE — PROGRESS NOTES
October 8, 2021    Assessment/Plan:  CKD1 with proteinuria  T1DM  Cr somewhat higher since initiation of SGLT-2 inhibitor but in expected range and UPCR is significantly better. Ideally would continue to titrate dose up but patient is now euvolemic with controlled BP and given Cr elevation would want to ensure stability first. Will continue to follow closely and consider titrating dose further at follow-up.  PLA2R negative but other serologic workup has not been completed and advised patient to schedule appointment with lab to complete this. Renal US was normal.  Continue ACE inhibitor    R foot pain  Will refer to podiatry    Discussed with Dr Amanda Rodrigez MD  Renal Fellow  [phone]    HPI: Aldo Sharpe is a 51 year old male who presents for follow-up. PMH includes DM1 since 1985 without neuropathy or retinopathy, HTN >20 yrs, HLD, ED on viagra.     Notes he feels a lot better. Leg swelling has resolved, and notes BP is now consistently normal. Has less abdominal bloating. Has cut down on salt intake. Has been tolerating initiation of SGLT-2 inhibitor well. No symptoms of orthostasis, dysuria, fevers/chills, chest pain, abdominal pain. Has not taken Advil in the past few months, has only taken tylenol for ongoing pain in R foot which remains his main complaint. This has been going on for about 5 months and is primarily provoked by walking on steps or up an incline, not on level ground. Patient works as a  though and symptoms can become significantly limiting.    Home BP: 120/80    No Known Allergies    amLODIPine (NORVASC) 10 MG tablet, Take 1 tablet (10 mg) by mouth daily  amLODIPine (NORVASC) 5 MG tablet, TAKE 1 TABLET BY MOUTH EVERY DAY  aspirin 81 MG tablet, Take 1 tablet (81 mg) by mouth daily  B-D U/F insulin pen needle, Use one Syringe 4 times daily or as directed  CONTOUR NEXT TEST test strip, Use to test blood sugar 8 times daily or as directed.  dapagliflozin (FARXIGA) 5 MG  TABS tablet, Take 1 tablet (5 mg) by mouth daily  insulin aspart (NOVOLOG VIAL) 100 UNITS/ML vial, INJECT UP TO  170 UNITS     DAILY VIA INSULIN PUMP  insulin aspart (NOVOLOG VIAL) 100 UNITS/ML vial, About 170 units with insulin pump  insulin glargine (BASAGLAR KWIKPEN) 100 UNIT/ML pen, Inject 32 units subcutaneous every 24 hours in the event of insulin pump failure.  lisinopril (ZESTRIL) 20 MG tablet, Take 1 tablet (20 mg) by mouth 2 times daily  metFORMIN (GLUCOPHAGE) 500 MG tablet, Take 500 mg by mouth 2 times daily (with meals)  multivitamin, therapeutic with minerals (MULTI-VITAMIN) TABS, Take 1 tablet by mouth daily  omega 3 1000 MG CAPS, Take 3 capsules by mouth daily (Patient taking differently: Take 2 capsules by mouth daily )  sildenafil (VIAGRA) 100 MG tablet, Take 1 tablet (100 mg) by mouth daily as needed (erectile dysfunction, no not take with NTG products)  simvastatin (ZOCOR) 40 MG tablet, TAKE 1 TABLET BY MOUTH AT BEDTIME  acetone urine (KETOSTIX) test strip, Use one strip as needed to test urine for ketones (Patient not taking: Reported on 8/13/2021)  glucagon (GLUCAGON EMERGENCY) 1 MG kit, Inject 1 mg into the muscle once for 1 dose    No current facility-administered medications on file prior to visit.      Past Medical History:   Diagnosis Date     Diabetic eye exam (H) 03/18/14     Hyperlipidemia LDL goal <100 10/31/2010     Type 1 diabetes, HbA1c goal < 7% (H) 10/31/2010       No past surgical history on file.    Social History     Tobacco Use     Smoking status: Never Smoker     Smokeless tobacco: Never Used   Substance Use Topics     Alcohol use: No     Comment: none     Drug use: No       No family history on file.    ROS: A 12 point review of systems was negative other than noted above.     Exam:  /78   Pulse 66   Wt 102 kg (224 lb 14.4 oz)   SpO2 97%   BMI 31.37 kg/m      GENERAL APPEARANCE: alert and no distress  EYES: EOMI, no scleral icterus  HENT: mouth without ulcers or  lesions  NECK: supple, no goiter  RESP: lungs clear to auscultation   CV: regular rhythm, normal rate, no rub  Extremities: no edema  GI: soft, non-tender, nondistended  SKIN: no rash on exposed surfaces  NEURO: mentation intact and speech normal  PSYCH: affect normal/bright    Results:    Lab on 10/08/2021   Component Date Value Ref Range Status     Color Urine 10/08/2021 Straw  Colorless, Straw, Light Yellow, Yellow Final     Appearance Urine 10/08/2021 Clear  Clear Final     Glucose Urine 10/08/2021 >499* Negative mg/dL Final     Bilirubin Urine 10/08/2021 Negative  Negative Final     Ketones Urine 10/08/2021 Negative  Negative mg/dL Final     Specific Gravity Urine 10/08/2021 1.018  1.003 - 1.035 Final     Blood Urine 10/08/2021 Negative  Negative Final     pH Urine 10/08/2021 6.0  5.0 - 7.0 Final     Protein Albumin Urine 10/08/2021 30 * Negative mg/dL Final     Urobilinogen Urine 10/08/2021 Normal  Normal, 2.0 mg/dL Final     Nitrite Urine 10/08/2021 Negative  Negative Final     Leukocyte Esterase Urine 10/08/2021 Negative  Negative Final     RBC Urine 10/08/2021 <1  <=2 /HPF Final     WBC Urine 10/08/2021 <1  <=5 /HPF Final     Total Protein Random Urine g/L 10/08/2021 0.54  g/L Final    The reference range has not been established for total protein in random urine samples.  The result should be integrated into the clinical context for interpretation.     Total Protein Urine g/gr Creatinine 10/08/2021 0.96* 0.00 - 0.20 g/g Cr Final     Creatinine Urine mg/dL 10/08/2021 56  mg/dL Final     WBC Count 10/08/2021 7.3  4.0 - 11.0 10e3/uL Final     RBC Count 10/08/2021 5.19  4.40 - 5.90 10e6/uL Final     Hemoglobin 10/08/2021 15.3  13.3 - 17.7 g/dL Final     Hematocrit 10/08/2021 45.4  40.0 - 53.0 % Final     MCV 10/08/2021 88  78 - 100 fL Final     MCH 10/08/2021 29.5  26.5 - 33.0 pg Final     MCHC 10/08/2021 33.7  31.5 - 36.5 g/dL Final     RDW 10/08/2021 12.7  10.0 - 15.0 % Final     Platelet Count 10/08/2021  375  150 - 450 10e3/uL Final     Sodium 10/08/2021 137  133 - 144 mmol/L Final     Potassium 10/08/2021 4.6  3.4 - 5.3 mmol/L Final     Chloride 10/08/2021 101  94 - 109 mmol/L Final     Carbon Dioxide (CO2) 10/08/2021 29  20 - 32 mmol/L Final     Anion Gap 10/08/2021 7  3 - 14 mmol/L Final     Urea Nitrogen 10/08/2021 22  7 - 30 mg/dL Final     Creatinine 10/08/2021 1.19  0.66 - 1.25 mg/dL Final     Calcium 10/08/2021 9.3  8.5 - 10.1 mg/dL Final     Glucose 10/08/2021 184* 70 - 99 mg/dL Final     Albumin 10/08/2021 3.3* 3.4 - 5.0 g/dL Final     Phosphorus 10/08/2021 3.8  2.5 - 4.5 mg/dL Final     GFR Estimate 10/08/2021 71  >60 mL/min/1.73m2 Final    As of July 11, 2021, eGFR is calculated by the CKD-EPI creatinine equation, without race adjustment. eGFR can be influenced by muscle mass, exercise, and diet. The reported eGFR is an estimation only and is only applicable if the renal function is stable.     Hemoglobin A1C 10/08/2021 6.3* 0.0 - 5.6 % Final    Normal <5.7%   Prediabetes 5.7-6.4%    Diabetes 6.5% or higher     Note: Adopted from ADA consensus guidelines.     TSH 10/08/2021 3.23  0.40 - 4.00 mU/L Final

## 2021-10-09 DIAGNOSIS — E10.9 TYPE 1 DIABETES MELLITUS WITHOUT COMPLICATION (H): ICD-10-CM

## 2021-10-11 LAB
ALBUMIN MFR UR ELPH: 86.1 %
ALBUMIN SERPL ELPH-MCNC: 3.9 G/DL (ref 3.7–5.1)
ALPHA1 GLOB MFR UR ELPH: 2.9 %
ALPHA1 GLOB SERPL ELPH-MCNC: 0.3 G/DL (ref 0.2–0.4)
ALPHA2 GLOB MFR UR ELPH: 3 %
ALPHA2 GLOB SERPL ELPH-MCNC: 0.9 G/DL (ref 0.5–0.9)
B-GLOBULIN MFR UR ELPH: 4.8 %
B-GLOBULIN SERPL ELPH-MCNC: 1 G/DL (ref 0.6–1)
GAMMA GLOB MFR UR ELPH: 3.2 %
GAMMA GLOB SERPL ELPH-MCNC: 0.9 G/DL (ref 0.7–1.6)
KAPPA LC FREE SER-MCNC: 2.07 MG/DL (ref 0.33–1.94)
KAPPA LC FREE/LAMBDA FREE SER NEPH: 1.36 {RATIO} (ref 0.26–1.65)
LAMBDA LC FREE SERPL-MCNC: 1.52 MG/DL (ref 0.57–2.63)
M PROTEIN MFR UR ELPH: 0 %
M PROTEIN SERPL ELPH-MCNC: 0 G/DL
PROT PATTERN SERPL ELPH-IMP: NORMAL
PROT PATTERN SERPL IFE-IMP: NORMAL
PROT PATTERN UR ELPH-IMP: ABNORMAL

## 2021-10-11 NOTE — TELEPHONE ENCOUNTER
Routing refill request to provider for review/approval because:  Medication is reported/historical  Patient needs to be seen because:  Failing visit    Virgen Escudero RN

## 2021-10-28 ENCOUNTER — MYC REFILL (OUTPATIENT)
Dept: INTERNAL MEDICINE | Facility: CLINIC | Age: 51
End: 2021-10-28

## 2021-10-28 DIAGNOSIS — I10 ESSENTIAL HYPERTENSION, BENIGN: ICD-10-CM

## 2021-10-29 ENCOUNTER — TELEPHONE (OUTPATIENT)
Dept: ENDOCRINOLOGY | Facility: CLINIC | Age: 51
End: 2021-10-29
Payer: COMMERCIAL

## 2021-10-29 DIAGNOSIS — E10.9 TYPE 1 DIABETES MELLITUS WITHOUT COMPLICATION (H): ICD-10-CM

## 2021-10-29 DIAGNOSIS — I10 ESSENTIAL HYPERTENSION, BENIGN: ICD-10-CM

## 2021-10-29 RX ORDER — AMLODIPINE BESYLATE 5 MG/1
5 TABLET ORAL AT BEDTIME
Qty: 30 TABLET | Refills: 0 | Status: SHIPPED | OUTPATIENT
Start: 2021-10-29 | End: 2021-11-01 | Stop reason: DRUGHIGH

## 2021-10-29 RX ORDER — AMLODIPINE BESYLATE 10 MG/1
TABLET ORAL
Qty: 90 TABLET | Refills: 3 | OUTPATIENT
Start: 2021-10-29

## 2021-10-29 NOTE — TELEPHONE ENCOUNTER
Routing refill request to provider for review/approval because:  Patient needs to be seen because it has been more than 1 year since last office visit.    Hari Hernández, RN  [facility] Triage Nurse

## 2021-10-29 NOTE — TELEPHONE ENCOUNTER
To Bethany Rodrigez,     Please verify dosing - do you want pt taking Amlodipine 10mg every morning & 5mg at bedtime?      Spoke to pt, says that Nephrology wanted him taking Amlodipine 10mg in AM, and 5mg in PM.   Was put on Farxiga, with some higher BP readings, and says that is when this change happened.   Has been taking 10mg in AM and 5mg in PM for past 2 months.   Checks BP daily.  Still takes lisinopril 20mg BID.  BP used to average 160-170s.  Now it is 110-120s.  Today was 112/71. (has never been under 100).

## 2021-10-29 NOTE — TELEPHONE ENCOUNTER
Amlodipine 5 mg filled yesterday by primary.  Not sure if on both doses or one is to be DC'd.  Routing to provider.  If to go to endo now sees Dr. Olivares.  Virgen Escudero RN

## 2021-11-01 RX ORDER — AMLODIPINE BESYLATE 10 MG/1
10 TABLET ORAL DAILY
Qty: 30 TABLET | Refills: 0 | Status: SHIPPED | OUTPATIENT
Start: 2021-11-01 | End: 2021-11-18

## 2021-11-01 NOTE — TELEPHONE ENCOUNTER
As mentioned prior again this patient has not really been seen in the clinic since 2018 thus over 3 years.  It appears that he has been getting his medications managed by other providers.  I would suggest that until he seen unless told otherwise that he probably take 10 mg of amlodipine daily.

## 2021-11-01 NOTE — TELEPHONE ENCOUNTER
Text message:      Tobi Miller,     Please see message below. I personally do not remember advising patient to take amlodipine 10 + 5 and think he may be confusing the 5 mg dose with the farxiga which is also 5 mg but did want to ask that if his BP is now much better controlled, and he is tolerating the dose fine; does he really need to stop taking the extra 5 and only take 10 mg of amlodipine per day?     Thanks     Bethany Rushing, RN

## 2021-11-01 NOTE — TELEPHONE ENCOUNTER
Dr. Rosenthal,     See below message from patient's providers:    Patient has an appointment on 11/18/21 with you.       Please advise what you would like us to do    Jackie Rushing RN

## 2021-11-01 NOTE — TELEPHONE ENCOUNTER
I have not seen patient for quite some time in clinic.  I would follow the recommendations of his specialists and all refills should go to them as they appear to be managing such

## 2021-11-09 NOTE — TELEPHONE ENCOUNTER
Informed pt he should be taking Amlodipine 10mg every morning.    Future Office visit:    Next 5 appointments (look out 90 days)    Nov 18, 2021  3:30 PM  SHORT with Kaleb Rosenthal MD  [facility] ([facility] ) [address] [id number] [phone]   Jan 21, 2022  9:00 AM  (Arrive by 8:45 AM)  Return Visit with Bethany Schwarz MD  [facility] ([facility] ) [address] [place] [id number] [phone]

## 2021-11-16 ENCOUNTER — TELEPHONE (OUTPATIENT)
Dept: ENDOCRINOLOGY | Facility: CLINIC | Age: 51
End: 2021-11-16
Payer: COMMERCIAL

## 2021-11-16 NOTE — TELEPHONE ENCOUNTER
Received form(s) from [employer]  Placed form(s) in/on TL's incoming basket.  Forms need to be filled out and signed and faxed to number listed on form.    AWAITING COMPLETION      Copy needs to be sent for scanning after completion: Yes     Nieves Barnhart MA

## 2021-11-17 ENCOUNTER — MEDICAL CORRESPONDENCE (OUTPATIENT)
Dept: HEALTH INFORMATION MANAGEMENT | Facility: CLINIC | Age: 51
End: 2021-11-17
Payer: COMMERCIAL

## 2021-11-18 ENCOUNTER — OFFICE VISIT (OUTPATIENT)
Dept: INTERNAL MEDICINE | Facility: CLINIC | Age: 51
End: 2021-11-18
Payer: COMMERCIAL

## 2021-11-18 VITALS
WEIGHT: 225.2 LBS | HEIGHT: 71 IN | HEART RATE: 70 BPM | SYSTOLIC BLOOD PRESSURE: 106 MMHG | OXYGEN SATURATION: 97 % | RESPIRATION RATE: 14 BRPM | TEMPERATURE: 97.9 F | DIASTOLIC BLOOD PRESSURE: 64 MMHG | BODY MASS INDEX: 31.53 KG/M2

## 2021-11-18 DIAGNOSIS — Z12.11 COLON CANCER SCREENING: ICD-10-CM

## 2021-11-18 DIAGNOSIS — Z12.5 SCREENING PSA (PROSTATE SPECIFIC ANTIGEN): ICD-10-CM

## 2021-11-18 DIAGNOSIS — E78.5 HYPERLIPIDEMIA LDL GOAL <100: ICD-10-CM

## 2021-11-18 DIAGNOSIS — Z00.00 ENCOUNTER FOR ROUTINE ADULT HEALTH EXAMINATION WITHOUT ABNORMAL FINDINGS: Primary | ICD-10-CM

## 2021-11-18 DIAGNOSIS — E10.9 TYPE 1 DIABETES MELLITUS WITHOUT COMPLICATION (H): ICD-10-CM

## 2021-11-18 DIAGNOSIS — I10 ESSENTIAL HYPERTENSION, BENIGN: ICD-10-CM

## 2021-11-18 PROCEDURE — 99386 PREV VISIT NEW AGE 40-64: CPT | Performed by: INTERNAL MEDICINE

## 2021-11-18 RX ORDER — SIMVASTATIN 40 MG
40 TABLET ORAL AT BEDTIME
Qty: 90 TABLET | Refills: 3 | Status: SHIPPED | OUTPATIENT
Start: 2021-11-18 | End: 2021-12-21

## 2021-11-18 RX ORDER — LISINOPRIL 20 MG/1
20 TABLET ORAL 2 TIMES DAILY
Qty: 180 TABLET | Refills: 3 | Status: SHIPPED | OUTPATIENT
Start: 2021-11-18 | End: 2022-02-09

## 2021-11-18 RX ORDER — AMLODIPINE BESYLATE 10 MG/1
10 TABLET ORAL DAILY
Qty: 90 TABLET | Refills: 3 | Status: SHIPPED | OUTPATIENT
Start: 2021-11-18 | End: 2022-09-20

## 2021-11-18 ASSESSMENT — MIFFLIN-ST. JEOR: SCORE: 1903.63

## 2021-11-18 NOTE — PROGRESS NOTES
SUBJECTIVE:   CC: Aldo Sharpe is an 51 year old male who presents for preventative health visit.   Patient has been advised of split billing requirements and indicates understanding: Yes  Healthy Habits:     Getting at least 3 servings of Calcium per day:  Yes    Bi-annual eye exam:  Yes    Dental care twice a year:  Yes    Sleep apnea or symptoms of sleep apnea:  None    Diet:  Low salt    Frequency of exercise:  6-7 days/week    Duration of exercise:  Greater than 60 minutes    Taking medications regularly:  Yes    Medication side effects:  None    PHQ-2 Total Score: 0    Additional concerns today:  No      Today's PHQ-2 Score:   PHQ-2 ( 1999 [employer]) 10/8/2021   Q1: Little interest or pleasure in doing things 0   Q2: Feeling down, depressed or hopeless 0   PHQ-2 Score 0   PHQ-2 Total Score (12-17 Years)- Positive if 3 or more points; Administer PHQ-A if positive 0   Q1: Little interest or pleasure in doing things -   Q2: Feeling down, depressed or hopeless -   PHQ-2 Score -       Abuse: Current or Past(Physical, Sexual or Emotional)- No  Do you feel safe in your environment? Yes    Have you ever done Advance Care Planning? (For example, a Health Directive, POLST, or a discussion with a medical provider or your loved ones about your wishes): No, advance care planning information given to patient to review.  Patient declined advance care planning discussion at this time.    Social History     Tobacco Use     Smoking status: Never Smoker     Smokeless tobacco: Never Used   Substance Use Topics     Alcohol use: No     Comment: none     If you drink alcohol do you typically have >3 drinks per day or >7 drinks per week? Not applicable    No flowsheet data found.    Last PSA: No results found for: PSA    Reviewed orders with patient. Reviewed health maintenance and updated orders accordingly - Yes    Reviewed and updated as needed this visit by clinical staff                Reviewed and updated as needed this visit  "by Provider               Review of Systems  CONSTITUTIONAL: NEGATIVE for fever, chills, change in weight  INTEGUMENTARY/SKIN: NEGATIVE for worrisome rashes, moles or lesions  EYES: NEGATIVE for vision changes or irritation  ENT: NEGATIVE for ear, mouth and throat problems  RESP: NEGATIVE for significant cough or SOB  CV: NEGATIVE for chest pain, palpitations or peripheral edema  GI: NEGATIVE for nausea, abdominal pain, heartburn, or change in bowel habits   male: negative for dysuria, hematuria, decreased urinary stream, erectile dysfunction, urethral discharge  MUSCULOSKELETAL: NEGATIVE for significant arthralgias or myalgia  NEURO: NEGATIVE for weakness, dizziness or paresthesias  PSYCHIATRIC: NEGATIVE for changes in mood or affect    OBJECTIVE:   /64   Pulse 70   Temp 97.9  F (36.6  C) (Temporal)   Resp 14   Ht 1.803 m (5' 11\")   Wt 102.2 kg (225 lb 3.2 oz)   SpO2 97%   BMI 31.41 kg/m      Physical Exam:    GENERAL: healthy, alert and no distress  EYES: Eyes grossly normal to inspection, PERRL and conjunctivae and sclerae normal  HENT: ear canals and TM's normal, nose and mouth without ulcers or lesions  NECK: no adenopathy, no asymmetry, masses, or scars and thyroid normal to palpation  RESP: lungs clear to auscultation - no rales, rhonchi or wheezes  CV: regular rate and rhythm, normal S1 S2, no S3 or S4, no click or rub, no peripheral edema and peripheral pulses strong  ABDOMEN: soft, nontender, no hepatosplenomegaly, no masses and bowel sounds normal, small umbilical hernia reducible, non-tender  RECTAL: normal sphincter tone, no rectal masses, prostate normal size, smooth, nontender without nodules or masses  MS: no gross musculoskeletal defects noted  NEURO: No focal changes  PSYCH: mentation appears normal, affect normal/bright      ASSESSMENT/PLAN:          (Z00.00) Encounter for routine adult health examination without abnormal findings  (primary encounter diagnosis)  Comment: Discussed " "updating vaccinations accordingly.  Plan:     (E10.9) Type 1 diabetes mellitus without complication (H)  Comment: Stable and improved and continuing to follow-up with endocrinology as directed  Plan: amLODIPine (NORVASC) 10 MG tablet, lisinopril         (ZESTRIL) 20 MG tablet            (I10) Essential hypertension, benign  Comment: Stable on therapy, no changes in medicine at present time  Plan: amLODIPine (NORVASC) 10 MG tablet, lisinopril         (ZESTRIL) 20 MG tablet            (E78.5) Hyperlipidemia LDL goal <100  Comment: Labs ordered as fasting, continue with statin therapy  Plan: simvastatin (ZOCOR) 40 MG tablet, Hepatic         function panel            (Z12.5) Screening PSA (prostate specific antigen)  Comment: Ordered as routine screening based on age and normal digital exam  Plan: PSA, screen            (Z12.11) Colon cancer screening  Comment: Recommended colonoscopy.  Patient will discuss with endocrinologist issues in regards to insulin dosing.  Discussed Cologuard as option if interested  Plan: Adult Gastro Ref - Procedure Only, Fecal         colorectal cancer screen (FIT)          Patient has been advised of split billing requirements and indicates understanding: Yes     COUNSELING:   Reviewed preventive health counseling, as reflected in patient instructions       Regular exercise       Healthy diet/nutrition    Estimated body mass index is 31.37 kg/m  as calculated from the following:    Height as of 7/10/18: 1.803 m (5' 11\").    Weight as of 10/8/21: 102 kg (224 lb 14.4 oz).     He reports that he has never smoked. He has never used smokeless tobacco.    Counseling Resources:  ATP IV Guidelines  Pooled Cohorts Equation Calculator  FRAX Risk Assessment  ICSI Preventive Guidelines  Dietary Guidelines for Americans, 2010  USDA's MyPlate  ASA Prophylaxis  Lung CA Screening    Kaleb Rosenthal MD  [facility]  "

## 2021-11-18 NOTE — TELEPHONE ENCOUNTER
Form(s) have been faxed.  Faxed or mailed to: number listed on form.  Form(s) in accordion file for 1 month then to scan.    Nieves Barnhart MA

## 2021-12-02 DIAGNOSIS — I10 ESSENTIAL HYPERTENSION, BENIGN: ICD-10-CM

## 2021-12-02 DIAGNOSIS — E10.9 TYPE 1 DIABETES MELLITUS WITHOUT COMPLICATION (H): ICD-10-CM

## 2021-12-03 RX ORDER — AMLODIPINE BESYLATE 10 MG/1
TABLET ORAL
Qty: 30 TABLET | Refills: 0 | OUTPATIENT
Start: 2021-12-03

## 2021-12-03 NOTE — TELEPHONE ENCOUNTER
Call to patient as he has a 90 day supply for a year thru [facility] mail order supply.   He does not want this and did not order it.    Refused and sent message to pharmacy.    Archana Johnson RN  [facility]

## 2021-12-16 DIAGNOSIS — I10 ESSENTIAL HYPERTENSION, BENIGN: ICD-10-CM

## 2021-12-16 DIAGNOSIS — E10.9 TYPE 1 DIABETES MELLITUS WITHOUT COMPLICATION (H): ICD-10-CM

## 2021-12-17 DIAGNOSIS — E78.5 HYPERLIPIDEMIA LDL GOAL <100: ICD-10-CM

## 2021-12-17 RX ORDER — LISINOPRIL 20 MG/1
TABLET ORAL
Qty: 180 TABLET | Refills: 3 | OUTPATIENT
Start: 2021-12-17

## 2021-12-21 RX ORDER — SIMVASTATIN 40 MG
TABLET ORAL
Qty: 90 TABLET | Refills: 0 | Status: SHIPPED | OUTPATIENT
Start: 2021-12-21 | End: 2022-05-09

## 2021-12-23 DIAGNOSIS — E10.9 TYPE 1 DIABETES MELLITUS WITHOUT COMPLICATION (H): ICD-10-CM

## 2021-12-28 ENCOUNTER — LAB (OUTPATIENT)
Dept: LAB | Facility: CLINIC | Age: 51
End: 2021-12-28
Payer: COMMERCIAL

## 2021-12-28 DIAGNOSIS — E10.9 TYPE 1 DIABETES MELLITUS WITHOUT COMPLICATION (H): ICD-10-CM

## 2021-12-28 DIAGNOSIS — I10 ESSENTIAL HYPERTENSION, BENIGN: ICD-10-CM

## 2021-12-28 DIAGNOSIS — E78.5 HYPERLIPIDEMIA LDL GOAL <100: ICD-10-CM

## 2021-12-28 DIAGNOSIS — Z12.5 SCREENING PSA (PROSTATE SPECIFIC ANTIGEN): ICD-10-CM

## 2021-12-28 LAB
ALBUMIN SERPL-MCNC: 3.4 G/DL (ref 3.4–5)
ALP SERPL-CCNC: 69 U/L (ref 40–150)
ALT SERPL W P-5'-P-CCNC: 39 U/L (ref 0–70)
AST SERPL W P-5'-P-CCNC: 18 U/L (ref 0–45)
BILIRUB DIRECT SERPL-MCNC: 0.1 MG/DL (ref 0–0.2)
BILIRUB SERPL-MCNC: 0.5 MG/DL (ref 0.2–1.3)
HBA1C MFR BLD: 6.7 % (ref 0–5.6)
PROT SERPL-MCNC: 7 G/DL (ref 6.8–8.8)
PSA SERPL-MCNC: 0.56 UG/L (ref 0–4)

## 2021-12-28 PROCEDURE — 83036 HEMOGLOBIN GLYCOSYLATED A1C: CPT

## 2021-12-28 PROCEDURE — G0103 PSA SCREENING: HCPCS

## 2021-12-28 PROCEDURE — 36415 COLL VENOUS BLD VENIPUNCTURE: CPT

## 2021-12-28 PROCEDURE — 80076 HEPATIC FUNCTION PANEL: CPT

## 2021-12-29 DIAGNOSIS — E10.9 TYPE 1 DIABETES MELLITUS WITHOUT COMPLICATION (H): ICD-10-CM

## 2021-12-31 NOTE — TELEPHONE ENCOUNTER
Routing refill request to provider for review/approval because:   Short Acting Insulin Protocol Failed 12/29/2021 04:33 PM   Protocol Details  Recent (6 mo) or future (30 days) visit within the authorizing provider's specialty        Natasha Ochoa RN

## 2022-01-12 ENCOUNTER — VIRTUAL VISIT (OUTPATIENT)
Dept: ENDOCRINOLOGY | Facility: CLINIC | Age: 52
End: 2022-01-12
Payer: COMMERCIAL

## 2022-01-12 DIAGNOSIS — Z96.41 INSULIN PUMP STATUS: ICD-10-CM

## 2022-01-12 DIAGNOSIS — E10.21 TYPE 1 DIABETES MELLITUS WITH DIABETIC NEPHROPATHY (H): Primary | ICD-10-CM

## 2022-01-12 PROCEDURE — 95251 CONT GLUC MNTR ANALYSIS I&R: CPT | Performed by: INTERNAL MEDICINE

## 2022-01-12 PROCEDURE — 99213 OFFICE O/P EST LOW 20 MIN: CPT | Mod: 95 | Performed by: INTERNAL MEDICINE

## 2022-01-12 NOTE — PROGRESS NOTES
Patient is being evaluated via a billable video visit.      How would you like to obtain your AVS? Reviewed verbally  If the video visit is dropped, the invitation should be resent by cell phone  Will anyone else be joining your video visit? no      Video Start Time:  1:25 pm    Video-Visit Details    Type of service:  Video Visit    Video End Time: 1:40 pm    Originating Location (pt. Location): home    Distant Location (provider location):  [facility]/home    Platform used for Video Visit:  [email or web address]          Recent issues:  Diabetes follow-up evaluation  Patient continues with the Medtronic 670G pump and Guardian3 sensor, but no pump upload data this week  Started Farxiga med per nephrologist, noticed less swelling and also wt loss  Reviewed medical history from patient and Epic chart record         1985. Diagnosis of diabetes mellitus age 15, living in [facility]  Symptoms of increased thirst, feeling unwell  Medical evaluation and lab testing showed high glucose level  Began treatment with diabetes pills, then transitioned to insulin 1-1.5 yrs later  Had taken Lantus, Humalog insulins  Recalls problems with armando phenomenon  Hospitalization for DKA: none  1/2019. Switched to Medtronic insulin pump     Previous FV hgbA1c trends include:     Lab Test 08/26/20  0854 01/07/20  1721 10/09/19  1231 06/20/19  0925 03/20/19  1333   A1C 6.7* 6.6* 6.9* 6.8* 7.0*     Using the Medtronic 670G pump and also metformin, uses Guardian3 sensor regularly      5/5/21. Initial diabetes evaluation with me at [facility]  Reviewed health history and diabetes management  Initial labs included high UCR, then 24-hr urine showing proteinuria up to 2 g/g Cr    8/2021. Nephrology evaluations with Dr. Bethany Rodrigez/[facility] [place]  Started low sodium diet, also Farxiga 5 mg daily, dose reduction metformin as 500 mg BID  Purchased home BP monitor, also started participation with [employer] study that includes use of Elastra  Watch  Patient feeling better overall, better energy and some wt loss    Using the Medtronic 670G pump with Guardian3 sensor  Current pump brand:   Novolog in pump   Metformin 500 mg morning and evening   Farxiga 5 mg in evening    Blood glucose (BG) meter:  Contour Next, also the Livongo meter   Tests up to 8x/day    Previous pump settings:      Recent Medtronic Carelink data:                  Recent FV labs include:  Lab Results   Component Value Date    A1C 6.7 (H) 12/28/2021     10/08/2021    POTASSIUM 4.6 10/08/2021    CHLORIDE 101 10/08/2021    CO2 29 10/08/2021    ANIONGAP 7 10/08/2021     (H) 10/08/2021    BUN 22 10/08/2021    CR 1.19 10/08/2021    GFRESTIMATED 71 10/08/2021    GFRESTBLACK >90 05/28/2021    MAGGIE 9.3 10/08/2021    CPEPT <0.1 (L) 05/28/2021    CHOL 145 05/28/2021    TRIG 90 05/28/2021    HDL 47 05/28/2021    LDL 80 05/28/2021    NHDL 98 05/28/2021    UCRR 56 10/08/2021    MICROL 1,260 05/28/2021    UMALCR 1,792.32 (H) 05/28/2021    TSH 3.23 10/08/2021    T4 1.08 08/26/2020     Lab Results   Component Value Date    TSH 3.23 10/08/2021    T4 1.08 08/26/2020    TPO 53 (H) 10/08/2021       Last eye exam 12/2020, no DR  DM Complications:     Nephropathy:    proteinuria      Lives in [place], [place], works as [employer]   Sees Dr. Kaleb Rosenthal/[facility] for general medicine evaluations.  Also sees Dr. Bethany Rodrigez/[facility] Nephrology    PMH/PSH:  Past Medical History:   Diagnosis Date     Diabetic eye exam (H) 03/18/2014     Hyperlipidemia LDL goal <100 10/31/2010     Right foot pain      Type 1 diabetes mellitus with complications (H)     nephropathy     No past surgical history on file.    Family Hx:  No family history on file.      Social Hx:  Social History     Socioeconomic History     Marital status:      Spouse name: Not on file     Number of children: Not on file     Years of education: Not on file     Highest education level: Not on file    Occupational History     Not on file   Tobacco Use     Smoking status: Never Smoker     Smokeless tobacco: Never Used   Substance and Sexual Activity     Alcohol use: No     Comment: none     Drug use: No     Sexual activity: Yes     Partners: Female   Other Topics Concern     Parent/sibling w/ CABG, MI or angioplasty before 65F 55M? Not Asked   Social History Narrative     Not on file     Social Determinants of Health     Financial Resource Strain: Not on file   Food Insecurity: Not on file   Transportation Needs: Not on file   Physical Activity: Not on file   Stress: Not on file   Social Connections: Not on file   Intimate Partner Violence: Not on file   Housing Stability: Not on file          MEDICATIONS:  has a current medication list which includes the following prescription(s): amlodipine, aspirin, dapagliflozin, insulin aspart, lisinopril, metformin, omega 3, sildenafil, simvastatin, b-d u/f, contour next test, glucagon emergency, and insulin glargine.    ROS:     ROS: 10 point ROS neg other than the symptoms noted above in the HPI.    GENERAL: mild fatigue, wt loss; denies fevers, chills, night sweats.   HEENT: no dysphagia, odonophagia, diplopia, neck pain  THYROID:  no apparent hyper or hypothyroid symptoms  CV: no chest pain, pressure, palpitations  LUNGS: no SOB, MCMANUS, cough, wheezing   ABDOMEN: some bloating; no diarrhea, constipation, abdominal pain  EXTREMITIES: no rashes, ulcers, edema  NEUROLOGY: no headaches, denies changes in vision, tingling, extremitiy numbness   MSK:  Right ankle/foot pains; denies muscle weakness  SKIN: no rashes or lesions  : some decreased erection function, uses sildenafil  PSYCH:  stable mood, no significant anxiety or depression  ENDOCRINE: no heat or cold intolerance    Physical Exam (visual exam)  VS:  no vital signs taken for video visit  CONSTITUTIONAL: healthy, alert and NAD, well dressed, answering questions appropriately  ENT: no nose swelling or nasal  discharge, mouth redness or gum changes.  EYES: eyes grossly normal to inspection, conjunctivae and sclerae normal, no exophthalmos or proptosis  THYROID:  no apparent nodules or goiter  LUNGS: no audible wheeze, cough or visible cyanosis, no visible retractions or increased work of breathing  ABDOMEN: abdomen not evaluated  EXTREMITIES: no hand tremors, limited exam  NEUROLOGY: CN grossly intact, mentation intact and speech normal   SKIN:  no apparent skin lesions, rash, or edema with visualized skin appearance  PSYCH: mentation appears normal, affect normal/bright, judgement and insight intact,   normal speech and appearance well groomed      LABS:    All pertinent notes, labs, and images personally reviewed by me.     A/P:  Encounter Diagnoses   Name Primary?     Type 1 diabetes mellitus with diabetic nephropathy (H) Yes     Insulin pump status        Comments:  Reviewed health history and diabetes issues.  Difficult to assess glycemic control without recent glucose trend information  Reviewed and interpreted tests that I previously ordered.   Ordered appropriate tests for the endocrinology disease management.    Management options discussed and implemented after shared medical decision making with the patient.  The T1DM problem is chronic-stable    Plan:  Reviewed the overall T1DM management and insulin pump use.  Discussed optimal BG testing to assess glucose trends.  We reviewed insulin pump settings, basal rate and bolus dosing  Use of automated pump bolus dosing for meal/snack carb & correction dosing  Reviewed the value of reviewing the Medtronic Carelink report datal.    Recommend:  Continue the current Medtronic insulin pump and Guardian3 sensor diabetes management plan  Reasonable to continue lower dose metformin and Farxiga medications  No pump setting changes at this time    Watch for post lunchtime hyperglycemia, consider intensifying lunch ICR 11a- 5p 3.8  Discussed importance oMedtronic pump upload  at home or with nearby CDE, prior to appointments  Use sensor in auto-mode consistently  Recommend diabetes education appointment to review pump upload process, pump data and settings   Discussed the [facility] AV clinic   DM Ed Referral placed  Future labs including hgbA1c ~3/2022   Lab order placed  Keep focus on diet, exercise, and weight management  Agree with low sodium diet  Discussed the plan to reduce pump basal rate if/when colonoscopy prep  Continue the BP and lipid medication plan  Need close monitoring of kidney function, eGFR, BP  Arrange annual dilated eye exam, fasting lipid panel testing.    Keep follow-up nephrology appointments to assist with kidney function, antihypertensive management.  Addressed patient's questions today.    There are no Patient Instructions on file for this visit.    Future labs ordered today:   Orders Placed This Encounter   Procedures     GLUCOSE MONITOR, 72 HOUR, PHYS INTERP     Hemoglobin A1c     Radiology/Consults ordered today: None    Total time spent in with the patient evaluation:  15 min  Additional time spent reviewing pertinent lab tests and chart notes, and documentation:  10 min    Follow-up:  5/3/22 at 1pm, Elena Olivares MD, MS  Endocrinology  [facility]    CC: KEELEY Rosenthal and KARINA Rodrigez

## 2022-01-12 NOTE — LETTER
1/12/2022         RE: Aldo Sharpe  [address] [place] [id number]        Dear Colleague,    Thank you for referring your patient, Aldo Sharpe, to the [facility]. Please see a copy of my visit note below.    Patient is being evaluated via a billable video visit.      How would you like to obtain your AVS? Reviewed verbally  If the video visit is dropped, the invitation should be resent by cell phone  Will anyone else be joining your video visit? no      Video Start Time:  1:25 pm    Video-Visit Details    Type of service:  Video Visit    Video End Time: 1:40 pm    Originating Location (pt. Location): home    Distant Location (provider location):  [facility]/home    Platform used for Video Visit:  [employer]          Recent issues:  Diabetes follow-up evaluation  Patient continues with the Medtronic 670G pump and Guardian3 sensor, but no pump upload data this week  Started Farxiga med per nephrologist, noticed less swelling and also wt loss  Reviewed medical history from patient and Epic chart record         1985. Diagnosis of diabetes mellitus age 15, living in [facility]  Symptoms of increased thirst, feeling unwell  Medical evaluation and lab testing showed high glucose level  Began treatment with diabetes pills, then transitioned to insulin 1-1.5 yrs later  Had taken Lantus, Humalog insulins  Recalls problems with armando phenomenon  Hospitalization for DKA: none  1/2019. Switched to Medtronic insulin pump     Previous FV hgbA1c trends include:     Lab Test 08/26/20  0854 01/07/20  1721 10/09/19  1231 06/20/19  0925 03/20/19  1333   A1C 6.7* 6.6* 6.9* 6.8* 7.0*     Using the Medtronic 670G pump and also metformin, uses Guardian3 sensor regularly      5/5/21. Initial diabetes evaluation with me at [place]  Reviewed health history and diabetes management  Initial labs included high UCR, then 24-hr urine showing proteinuria up to 2 g/g  Cr    8/2021. Nephrology evaluations with Dr. Bethany Rodrigez/[facility] [place]  Started low sodium diet, also Farxiga 5 mg daily, dose reduction metformin as 500 mg BID  Purchased home BP monitor, also started participation with [employer] study that includes use of Apple Watch  Patient feeling better overall, better energy and some wt loss    Using the Medtronic 670G pump with Guardian3 sensor  Current pump brand:   Novolog in pump   Metformin 500 mg morning and evening   Farxiga 5 mg in evening    Blood glucose (BG) meter:  Contour Next, also the Livongo meter   Tests up to 8x/day    Previous pump settings:      Recent Medtronic Carelink data:                  Recent FV labs include:  Lab Results   Component Value Date    A1C 6.7 (H) 12/28/2021     10/08/2021    POTASSIUM 4.6 10/08/2021    CHLORIDE 101 10/08/2021    CO2 29 10/08/2021    ANIONGAP 7 10/08/2021     (H) 10/08/2021    BUN 22 10/08/2021    CR 1.19 10/08/2021    GFRESTIMATED 71 10/08/2021    GFRESTBLACK >90 05/28/2021    MAGGIE 9.3 10/08/2021    CPEPT <0.1 (L) 05/28/2021    CHOL 145 05/28/2021    TRIG 90 05/28/2021    HDL 47 05/28/2021    LDL 80 05/28/2021    NHDL 98 05/28/2021    UCRR 56 10/08/2021    MICROL 1,260 05/28/2021    UMALCR 1,792.32 (H) 05/28/2021    TSH 3.23 10/08/2021    T4 1.08 08/26/2020     Lab Results   Component Value Date    TSH 3.23 10/08/2021    T4 1.08 08/26/2020    TPO 53 (H) 10/08/2021       Last eye exam 12/2020, no DR  DM Complications:     Nephropathy:    proteinuria      Lives in [place], [place], works as [employer]   Sees Dr. Kaleb Rosenthal/[facility] for general medicine evaluations.  Also sees Dr. Bethany Rodrigez/[facility] Neph[facility]    PMH/PSH:  Past Medical History:   Diagnosis Date     Diabetic eye exam (H) 03/18/2014     Hyperlipidemia LDL goal <100 10/31/2010     Right foot pain      Type 1 diabetes mellitus with complications (H)     nephropathy     No past surgical history on file.    Family Hx:  No  family history on file.      Social Hx:  Social History     Socioeconomic History     Marital status:      Spouse name: Not on file     Number of children: Not on file     Years of education: Not on file     Highest education level: Not on file   Occupational History     Not on file   Tobacco Use     Smoking status: Never Smoker     Smokeless tobacco: Never Used   Substance and Sexual Activity     Alcohol use: No     Comment: none     Drug use: No     Sexual activity: Yes     Partners: Female   Other Topics Concern     Parent/sibling w/ CABG, MI or angioplasty before 65F 55M? Not Asked   Social History Narrative     Not on file     Social Determinants of Health     Financial Resource Strain: Not on file   Food Insecurity: Not on file   Transportation Needs: Not on file   Physical Activity: Not on file   Stress: Not on file   Social Connections: Not on file   Intimate Partner Violence: Not on file   Housing Stability: Not on file          MEDICATIONS:  has a current medication list which includes the following prescription(s): amlodipine, aspirin, dapagliflozin, insulin aspart, lisinopril, metformin, omega 3, sildenafil, simvastatin, b-d u/f, contour next test, glucagon emergency, and insulin glargine.    ROS:     ROS: 10 point ROS neg other than the symptoms noted above in the HPI.    GENERAL: mild fatigue, wt loss; denies fevers, chills, night sweats.   HEENT: no dysphagia, odonophagia, diplopia, neck pain  THYROID:  no apparent hyper or hypothyroid symptoms  CV: no chest pain, pressure, palpitations  LUNGS: no SOB, MCMANUS, cough, wheezing   ABDOMEN: some bloating; no diarrhea, constipation, abdominal pain  EXTREMITIES: no rashes, ulcers, edema  NEUROLOGY: no headaches, denies changes in vision, tingling, extremitiy numbness   MSK:  Right ankle/foot pains; denies muscle weakness  SKIN: no rashes or lesions  : some decreased erection function, uses sildenafil  PSYCH:  stable mood, no significant anxiety or  depression  ENDOCRINE: no heat or cold intolerance    Physical Exam (visual exam)  VS:  no vital signs taken for video visit  CONSTITUTIONAL: healthy, alert and NAD, well dressed, answering questions appropriately  ENT: no nose swelling or nasal discharge, mouth redness or gum changes.  EYES: eyes grossly normal to inspection, conjunctivae and sclerae normal, no exophthalmos or proptosis  THYROID:  no apparent nodules or goiter  LUNGS: no audible wheeze, cough or visible cyanosis, no visible retractions or increased work of breathing  ABDOMEN: abdomen not evaluated  EXTREMITIES: no hand tremors, limited exam  NEUROLOGY: CN grossly intact, mentation intact and speech normal   SKIN:  no apparent skin lesions, rash, or edema with visualized skin appearance  PSYCH: mentation appears normal, affect normal/bright, judgement and insight intact,   normal speech and appearance well groomed      LABS:    All pertinent notes, labs, and images personally reviewed by me.     A/P:  Encounter Diagnoses   Name Primary?     Type 1 diabetes mellitus with diabetic nephropathy (H) Yes     Insulin pump status        Comments:  Reviewed health history and diabetes issues.  Difficult to assess glycemic control without recent glucose trend information  Reviewed and interpreted tests that I previously ordered.   Ordered appropriate tests for the endocrinology disease management.    Management options discussed and implemented after shared medical decision making with the patient.  The T1DM problem is chronic-stable    Plan:  Reviewed the overall T1DM management and insulin pump use.  Discussed optimal BG testing to assess glucose trends.  We reviewed insulin pump settings, basal rate and bolus dosing  Use of automated pump bolus dosing for meal/snack carb & correction dosing  Reviewed the value of reviewing the Medtronic Carelink report datal.    Recommend:  Continue the current Medtronic insulin pump and Guardian3 sensor diabetes  management plan  Reasonable to continue lower dose metformin and Farxiga medications  No pump setting changes at this time    Watch for post lunchtime hyperglycemia, consider intensifying lunch ICR 11a- 5p 3.8  Discussed importance oMedtronic pump upload at home or with nearby CDE, prior to appointments  Use sensor in auto-mode consistently  Recommend diabetes education appointment to review pump upload process, pump data and settings   Discussed the [facility] AV clinic   DM Ed Referral placed  Future labs including hgbA1c ~3/2022   Lab order placed  Keep focus on diet, exercise, and weight management  Agree with low sodium diet  Discussed the plan to reduce pump basal rate if/when colonoscopy prep  Continue the BP and lipid medication plan  Need close monitoring of kidney function, eGFR, BP  Arrange annual dilated eye exam, fasting lipid panel testing.    Keep follow-up nephrology appointments to assist with kidney function, antihypertensive management.  Addressed patient's questions today.    There are no Patient Instructions on file for this visit.    Future labs ordered today:   Orders Placed This Encounter   Procedures     GLUCOSE MONITOR, 72 HOUR, PHYS INTERP     Hemoglobin A1c     Radiology/Consults ordered today: None    Total time spent in with the patient evaluation:  15 min  Additional time spent reviewing pertinent lab tests and chart notes, and documentation:  10 min    Follow-up:  5/3/22 at 1pm, Elena Olivares MD, MS  Endocrinology  [facility]    CC: KEELEY Rosenthal and KARINA Rodrigez              Again, thank you for allowing me to participate in the care of your patient.        Sincerely,        Scar Olivares MD

## 2022-01-20 DIAGNOSIS — E10.22 CKD STAGE 2 DUE TO TYPE 1 DIABETES MELLITUS (H): Primary | ICD-10-CM

## 2022-01-20 DIAGNOSIS — N18.2 CKD STAGE 2 DUE TO TYPE 1 DIABETES MELLITUS (H): Primary | ICD-10-CM

## 2022-01-21 ENCOUNTER — OFFICE VISIT (OUTPATIENT)
Dept: NEPHROLOGY | Facility: CLINIC | Age: 52
End: 2022-01-21
Attending: INTERNAL MEDICINE
Payer: COMMERCIAL

## 2022-01-21 ENCOUNTER — LAB (OUTPATIENT)
Dept: LAB | Facility: CLINIC | Age: 52
End: 2022-01-21
Payer: COMMERCIAL

## 2022-01-21 VITALS
OXYGEN SATURATION: 97 % | WEIGHT: 223.4 LBS | HEART RATE: 68 BPM | SYSTOLIC BLOOD PRESSURE: 109 MMHG | BODY MASS INDEX: 31.16 KG/M2 | DIASTOLIC BLOOD PRESSURE: 72 MMHG

## 2022-01-21 DIAGNOSIS — E10.22 CKD STAGE 2 DUE TO TYPE 1 DIABETES MELLITUS (H): Primary | ICD-10-CM

## 2022-01-21 DIAGNOSIS — N18.2 CKD STAGE 2 DUE TO TYPE 1 DIABETES MELLITUS (H): ICD-10-CM

## 2022-01-21 DIAGNOSIS — E10.22 CKD STAGE 2 DUE TO TYPE 1 DIABETES MELLITUS (H): ICD-10-CM

## 2022-01-21 DIAGNOSIS — N18.2 CKD STAGE 2 DUE TO TYPE 1 DIABETES MELLITUS (H): Primary | ICD-10-CM

## 2022-01-21 LAB
ALBUMIN SERPL-MCNC: 3.4 G/DL (ref 3.4–5)
ALBUMIN UR-MCNC: 30 MG/DL
ANION GAP SERPL CALCULATED.3IONS-SCNC: 8 MMOL/L (ref 3–14)
APPEARANCE UR: CLEAR
BILIRUB UR QL STRIP: NEGATIVE
BUN SERPL-MCNC: 24 MG/DL (ref 7–30)
CALCIUM SERPL-MCNC: 8.9 MG/DL (ref 8.5–10.1)
CHLORIDE BLD-SCNC: 104 MMOL/L (ref 94–109)
CO2 SERPL-SCNC: 27 MMOL/L (ref 20–32)
COLOR UR AUTO: ABNORMAL
CREAT SERPL-MCNC: 1.14 MG/DL (ref 0.66–1.25)
CREAT UR-MCNC: 75 MG/DL
ERYTHROCYTE [DISTWIDTH] IN BLOOD BY AUTOMATED COUNT: 13.1 % (ref 10–15)
GFR SERPL CREATININE-BSD FRML MDRD: 78 ML/MIN/1.73M2
GLUCOSE BLD-MCNC: 134 MG/DL (ref 70–99)
GLUCOSE UR STRIP-MCNC: >499 MG/DL
HCT VFR BLD AUTO: 47.2 % (ref 40–53)
HGB BLD-MCNC: 15.4 G/DL (ref 13.3–17.7)
HGB UR QL STRIP: ABNORMAL
HYALINE CASTS: 3 /LPF
KETONES UR STRIP-MCNC: NEGATIVE MG/DL
LEUKOCYTE ESTERASE UR QL STRIP: NEGATIVE
MCH RBC QN AUTO: 28.8 PG (ref 26.5–33)
MCHC RBC AUTO-ENTMCNC: 32.6 G/DL (ref 31.5–36.5)
MCV RBC AUTO: 88 FL (ref 78–100)
NITRATE UR QL: NEGATIVE
PH UR STRIP: 5 [PH] (ref 5–7)
PHOSPHATE SERPL-MCNC: 3.6 MG/DL (ref 2.5–4.5)
PLATELET # BLD AUTO: 358 10E3/UL (ref 150–450)
POTASSIUM BLD-SCNC: 4.5 MMOL/L (ref 3.4–5.3)
PROT UR-MCNC: 0.31 G/L
PROT/CREAT 24H UR: 0.41 G/G CR (ref 0–0.2)
RBC # BLD AUTO: 5.35 10E6/UL (ref 4.4–5.9)
RBC URINE: <1 /HPF
SODIUM SERPL-SCNC: 139 MMOL/L (ref 133–144)
SP GR UR STRIP: 1.02 (ref 1–1.03)
UROBILINOGEN UR STRIP-MCNC: NORMAL MG/DL
WBC # BLD AUTO: 9.2 10E3/UL (ref 4–11)
WBC URINE: <1 /HPF

## 2022-01-21 PROCEDURE — 80069 RENAL FUNCTION PANEL: CPT | Performed by: PATHOLOGY

## 2022-01-21 PROCEDURE — 84156 ASSAY OF PROTEIN URINE: CPT | Performed by: PATHOLOGY

## 2022-01-21 PROCEDURE — 81001 URINALYSIS AUTO W/SCOPE: CPT | Performed by: PATHOLOGY

## 2022-01-21 PROCEDURE — 99214 OFFICE O/P EST MOD 30 MIN: CPT

## 2022-01-21 PROCEDURE — 36415 COLL VENOUS BLD VENIPUNCTURE: CPT | Performed by: PATHOLOGY

## 2022-01-21 PROCEDURE — 85027 COMPLETE CBC AUTOMATED: CPT | Performed by: PATHOLOGY

## 2022-01-21 ASSESSMENT — PAIN SCALES - GENERAL: PAINLEVEL: NO PAIN (0)

## 2022-01-21 NOTE — LETTER
1/21/2022       RE: Aldo Sharpe  [address] [facility] [address]     Dear Colleague,    Thank you for referring your patient, Aldo Sharpe, to the [facility] NEPHROLOG[facility] [place] at [facility]. Please see a copy of my visit note below.    Nephrology Clinic Visit 1/21/22    Assessment and Plan:    1. CKD2 w/proteinuria - Stable. Creat 1.1 eGFR 78 ml/mn, UPCR 0.4 g/gCr   - Etiology of his CKD is DM. Denies h/o retinopathy but no recent exam   - Baseline creat 0.9-1.1 through 2008   - On ACE   - Blood pressures well controlled   - DM well controlled with A1c 6.3%    2. Volume status/blood pressure - Denies edema/dyspnea. Blood pressures 109-110/72-74. Was started on dapagliflozin 8/31 for edema with improvement. Remains on Lisinopril 20 mg bid for blood pressure/proteinuria. Weight 101.3 kg. Was 107.1 kg in 8/21 when Dapagliflozin was started.    - Criteria for using SGLT2 inhibitor therapy in T1DM include: BMI > 27, stable optimized insulin therapy, GFR > 60.    - Will continue current therapy    3. DM1 - On Insulin pump/CGM- well controlled with A1c of 6.7% ( 12/21)   - He remains on Metformin per a previous PCP to help control his Elvira Phenomenon blood sugar problems and post prandial hyperglycemia. This was prior to his insulin pump and CGM which has enabled him to have much better control. It may be helping with appetite suppression/weight loss. He will d/w his his Endo whether to continue   - Dapagliflozin as above   - No hypoglycemia    4. Electrolytes - No acute concerns. K 4.5, Na 139    5. Acid base - No acute concerns. Bicarb 27    6. Disposition - RTC 6 months for follow up with labs prior    Assessment and plan was discussed with patient and he voiced his understanding and agreement.    Reason for Visit:  CKD2 follow up    HPI:  Mr Sharpe is a 52 yo male with CKD2, DM1, HTN, HLD, present today for routine CKD follow up. Last seen by  Dr Rodrigez 10/8/21.   Baseline creat 0.9-1.1    ROS:   No renal complaints  Feeling well other than dry skin  Is triple Covid vaxed/flu vaxed  Denies CP, dyspnea, abdominal concerns  No voiding issues  No edema since starting dapaglifozin  Has insulin pump, CGM and performs multiple finger sticks daily to manage his DM  Following a sodium restricted diet ( < 2 g/day)  Energy and appetite are good    Chronic Health Problems:  DM1 on insulin pump  HTN  HLD  Subclinical hypothyroidism  HLD    Family Hx:   No family history on file.     Personal Hx:   , employed as a , NS, ETOH none    Allergies:  No Known Allergies    Medications:  Current Outpatient Medications   Medication Sig     amLODIPine (NORVASC) 10 MG tablet Take 1 tablet (10 mg) by mouth daily     aspirin 81 MG tablet Take 1 tablet (81 mg) by mouth daily     B-D U/F insulin pen needle Use one Syringe 4 times daily or as directed     CONTOUR NEXT TEST test strip Use to test blood sugar 8 times daily or as directed.     dapagliflozin (FARXIGA) 5 MG TABS tablet Take 1 tablet (5 mg) by mouth daily     insulin aspart (NOVOLOG VIAL) 100 UNITS/ML vial INJECT UP TO  170 UNITS     DAILY VIA INSULIN PUMP     insulin glargine (BASAGLAR KWIKPEN) 100 UNIT/ML pen Inject 32 units subcutaneous every 24 hours in the event of insulin pump failure.     lisinopril (ZESTRIL) 20 MG tablet Take 1 tablet (20 mg) by mouth 2 times daily     metFORMIN (GLUCOPHAGE) 500 MG tablet Take 500 mg by mouth 2 times daily (with meals)     omega 3 1000 MG CAPS Take 3 capsules by mouth daily (Patient taking differently: Take 2 capsules by mouth daily )     sildenafil (VIAGRA) 100 MG tablet Take 1 tablet (100 mg) by mouth daily as needed (erectile dysfunction, no not take with NTG products)     simvastatin (ZOCOR) 40 MG tablet TAKE 1 TABLET BY MOUTH EVERYDAY AT BEDTIME     glucagon (GLUCAGON EMERGENCY) 1 MG kit Inject 1 mg into the muscle once for 1 dose     No current  facility-administered medications for this visit.      Vitals:  /72   Pulse 68   Wt 101.3 kg (223 lb 6.4 oz)   SpO2 97%   BMI 31.16 kg/m      Exam:  GEN: Pleasant male in NAD  CARDIAC RRR  LUNGS :CTA  ABDOMEN: Non distended  EXT: No edema  NEURO: A/O    LABS:   CMP  Recent Labs   Lab Test 01/21/22  0828 10/08/21  0822 08/13/21  0900 05/28/21  0818 08/26/20  0854 06/20/19  0925 07/10/18  0859    137 143 141 138 141 142   POTASSIUM 4.5 4.6 4.0 4.3 4.1 4.5 3.9   CHLORIDE 104 101 110* 108 106 107 107   CO2 27 29 28 33* 27 25 28   ANIONGAP 8 7 5 <1* 5 9 7   * 184* 85 118* 122* 134* 97   BUN 24 22 19 17 19 17 16   CR 1.14 1.19 1.03 1.00 0.94 0.87 0.98   GFRESTIMATED 78 71 84 87 >90 >90 82   GFRESTBLACK  --   --   --  >90 >90 >90 >90   MAGGIE 8.9 9.3 9.3 8.9 9.3 8.6 8.9     Recent Labs   Lab Test 12/28/21  0931 08/26/20  0854 06/20/19  0925 07/10/18  0859   BILITOTAL 0.5 0.3 0.5 0.4   ALKPHOS 69 62 62 64   ALT 39 34 27 44   AST 18 14 15 22     CBC  Recent Labs   Lab Test 01/21/22  0828 10/08/21  0822 08/13/21  0900   HGB 15.4 15.3 15.3   WBC 9.2 7.3 9.1   RBC 5.35 5.19 5.19   HCT 47.2 45.4 45.0   MCV 88 88 87   MCH 28.8 29.5 29.5   MCHC 32.6 33.7 34.0   RDW 13.1 12.7 12.9    375 350     URINE STUDIES  Recent Labs   Lab Test 01/21/22  0834 10/08/21  0824 08/13/21  0905   COLOR Straw Straw Yellow   APPEARANCE Clear Clear Slightly Cloudy*   URINEGLC >499* >499* Negative   URINEBILI Negative Negative Negative   URINEKETONE Negative Negative Negative   SG 1.016 1.018 1.020   UBLD Small* Negative Negative   URINEPH 5.0 6.0 5.0   PROTEIN 30 * 30 * >499*   NITRITE Negative Negative Negative   LEUKEST Negative Negative Negative   RBCU <1 <1 2   WBCU <1 <1 1     Recent Labs   Lab Test 01/21/22  0834 10/08/21  0824 08/13/21  0906 06/10/21  1113   UTPG 0.41* 0.96* 2.07* 1.52*     PTH  Recent Labs   Lab Test 08/13/21  0900   PTHI 33     IRON STUDIES  No lab results found.    Cierra Whitley, NP

## 2022-01-22 NOTE — PROGRESS NOTES
Nephrology Clinic Visit 1/21/22    Assessment and Plan:    1. CKD2 w/proteinuria - Stable. Creat 1.1 eGFR 78 ml/mn, UPCR 0.4 g/gCr   - Etiology of his CKD is DM. Denies h/o retinopathy but no recent exam   - Baseline creat 0.9-1.1 through 2008   - On ACE   - Blood pressures well controlled   - DM well controlled with A1c 6.3%    2. Volume status/blood pressure - Denies edema/dyspnea. Blood pressures 109-110/72-74. Was started on dapagliflozin 8/31 for edema with improvement. Remains on Lisinopril 20 mg bid for blood pressure/proteinuria. Weight 101.3 kg. Was 107.1 kg in 8/21 when Dapagliflozin was started.    - Criteria for using SGLT2 inhibitor therapy in T1DM include: BMI > 27, stable optimized insulin therapy, GFR > 60.    - Will continue current therapy    3. DM1 - On Insulin pump/CGM- well controlled with A1c of 6.7% ( 12/21)   - He remains on Metformin per a previous PCP to help control his Elvira Phenomenon blood sugar problems and post prandial hyperglycemia. This was prior to his insulin pump and CGM which has enabled him to have much better control. It may be helping with appetite suppression/weight loss. He will d/w his his Endo whether to continue   - Dapagliflozin as above   - No hypoglycemia    4. Electrolytes - No acute concerns. K 4.5, Na 139    5. Acid base - No acute concerns. Bicarb 27    6. Disposition - RTC 6 months for follow up with labs prior    Assessment and plan was discussed with patient and he voiced his understanding and agreement.    Reason for Visit:  CKD2 follow up    HPI:  Mr Sharpe is a 50 yo male with CKD2, DM1, HTN, HLD, present today for routine CKD follow up. Last seen by Dr Rodrigez 10/8/21.   Baseline creat 0.9-1.1    ROS:   No renal complaints  Feeling well other than dry skin  Is triple Covid vaxed/flu vaxed  Denies CP, dyspnea, abdominal concerns  No voiding issues  No edema since starting dapaglifozin  Has insulin pump, CGM and performs multiple finger sticks daily to  manage his DM  Following a sodium restricted diet ( < 2 g/day)  Energy and appetite are good    Chronic Health Problems:  DM1 on insulin pump  HTN  HLD  Subclinical hypothyroidism  HLD    Family Hx:   No family history on file.     Personal Hx:   , employed as a , NS, ETOH none    Allergies:  No Known Allergies    Medications:  Current Outpatient Medications   Medication Sig     amLODIPine (NORVASC) 10 MG tablet Take 1 tablet (10 mg) by mouth daily     aspirin 81 MG tablet Take 1 tablet (81 mg) by mouth daily     B-D U/F insulin pen needle Use one Syringe 4 times daily or as directed     CONTOUR NEXT TEST test strip Use to test blood sugar 8 times daily or as directed.     dapagliflozin (FARXIGA) 5 MG TABS tablet Take 1 tablet (5 mg) by mouth daily     insulin aspart (NOVOLOG VIAL) 100 UNITS/ML vial INJECT UP TO  170 UNITS     DAILY VIA INSULIN PUMP     insulin glargine (BASAGLAR KWIKPEN) 100 UNIT/ML pen Inject 32 units subcutaneous every 24 hours in the event of insulin pump failure.     lisinopril (ZESTRIL) 20 MG tablet Take 1 tablet (20 mg) by mouth 2 times daily     metFORMIN (GLUCOPHAGE) 500 MG tablet Take 500 mg by mouth 2 times daily (with meals)     omega 3 1000 MG CAPS Take 3 capsules by mouth daily (Patient taking differently: Take 2 capsules by mouth daily )     sildenafil (VIAGRA) 100 MG tablet Take 1 tablet (100 mg) by mouth daily as needed (erectile dysfunction, no not take with NTG products)     simvastatin (ZOCOR) 40 MG tablet TAKE 1 TABLET BY MOUTH EVERYDAY AT BEDTIME     glucagon (GLUCAGON EMERGENCY) 1 MG kit Inject 1 mg into the muscle once for 1 dose     No current facility-administered medications for this visit.      Vitals:  /72   Pulse 68   Wt 101.3 kg (223 lb 6.4 oz)   SpO2 97%   BMI 31.16 kg/m      Exam:  GEN: Pleasant male in NAD  CARDIAC RRR  LUNGS :CTA  ABDOMEN: Non distended  EXT: No edema  NEURO: A/O    LABS:   [facility]  Recent Labs   Lab Test 01/21/22  7688  10/08/21  0822 08/13/21  0900 05/28/21  0818 08/26/20  0854 06/20/19  0925 07/10/18  0859    137 143 141 138 141 142   POTASSIUM 4.5 4.6 4.0 4.3 4.1 4.5 3.9   CHLORIDE 104 101 110* 108 106 107 107   CO2 27 29 28 33* 27 25 28   ANIONGAP 8 7 5 <1* 5 9 7   * 184* 85 118* 122* 134* 97   BUN 24 22 19 17 19 17 16   CR 1.14 1.19 1.03 1.00 0.94 0.87 0.98   GFRESTIMATED 78 71 84 87 >90 >90 82   GFRESTBLACK  --   --   --  >90 >90 >90 >90   MAGGIE 8.9 9.3 9.3 8.9 9.3 8.6 8.9     Recent Labs   Lab Test 12/28/21  0931 08/26/20  0854 06/20/19  0925 07/10/18  0859   BILITOTAL 0.5 0.3 0.5 0.4   ALKPHOS 69 62 62 64   ALT 39 34 27 44   AST 18 14 15 22     CBC  Recent Labs   Lab Test 01/21/22  0828 10/08/21  0822 08/13/21  0900   HGB 15.4 15.3 15.3   WBC 9.2 7.3 9.1   RBC 5.35 5.19 5.19   HCT 47.2 45.4 45.0   MCV 88 88 87   MCH 28.8 29.5 29.5   MCHC 32.6 33.7 34.0   RDW 13.1 12.7 12.9    375 350     URINE STUDIES  Recent Labs   Lab Test 01/21/22  0834 10/08/21  0824 08/13/21  0905   COLOR Straw Straw Yellow   APPEARANCE Clear Clear Slightly Cloudy*   URINEGLC >499* >499* Negative   URINEBILI Negative Negative Negative   URINEKETONE Negative Negative Negative   SG 1.016 1.018 1.020   UBLD Small* Negative Negative   URINEPH 5.0 6.0 5.0   PROTEIN 30 * 30 * >499*   NITRITE Negative Negative Negative   LEUKEST Negative Negative Negative   RBCU <1 <1 2   WBCU <1 <1 1     Recent Labs   Lab Test 01/21/22  0834 10/08/21  0824 08/13/21  0906 06/10/21  1113   UTPG 0.41* 0.96* 2.07* 1.52*     PTH  Recent Labs   Lab Test 08/13/21  0900   PTHI 33     IRON STUDIES  No lab results found.    Cierra Whitley, NP

## 2022-01-29 DIAGNOSIS — N52.9 ERECTILE DYSFUNCTION, UNSPECIFIED ERECTILE DYSFUNCTION TYPE: ICD-10-CM

## 2022-02-01 DIAGNOSIS — N52.9 ERECTILE DYSFUNCTION, UNSPECIFIED ERECTILE DYSFUNCTION TYPE: ICD-10-CM

## 2022-02-01 RX ORDER — SILDENAFIL 100 MG/1
100 TABLET, FILM COATED ORAL DAILY PRN
Qty: 18 TABLET | Refills: 1 | Status: SHIPPED | OUTPATIENT
Start: 2022-02-01 | End: 2022-10-10

## 2022-02-01 RX ORDER — SILDENAFIL 100 MG/1
TABLET, FILM COATED ORAL
Qty: 18 TABLET | Refills: 11 | OUTPATIENT
Start: 2022-02-01

## 2022-02-01 RX ORDER — SILDENAFIL 100 MG/1
100 TABLET, FILM COATED ORAL DAILY PRN
Qty: 18 TABLET | Refills: 1 | Status: CANCELLED | OUTPATIENT
Start: 2022-02-01

## 2022-02-01 NOTE — TELEPHONE ENCOUNTER
Patient called in regarding script. States that he thought he has discussed this medication with you at visit in November, nurse encouraged patient schedule visit just in case, virtual scheduled 2/14 patient wondering if appointment is needed and if he has to wait until appointment to get his medication?    Hari Hernández RN

## 2022-02-02 DIAGNOSIS — N52.9 ERECTILE DYSFUNCTION, UNSPECIFIED ERECTILE DYSFUNCTION TYPE: ICD-10-CM

## 2022-02-02 NOTE — TELEPHONE ENCOUNTER
Would you like patient to still have a visit with you on 2/14 to discuss the medication?     Hari Hernández RN

## 2022-02-02 NOTE — TELEPHONE ENCOUNTER
Would have  follow-up if patient has other issues to discuss. Appears patient is due for colonoscopy and could discuss with patient at visit

## 2022-02-03 RX ORDER — SILDENAFIL 100 MG/1
100 TABLET, FILM COATED ORAL DAILY PRN
Qty: 18 TABLET | Refills: 1 | OUTPATIENT
Start: 2022-02-03

## 2022-02-03 NOTE — TELEPHONE ENCOUNTER
This is a duplicate refill request, that has been refused to the pharmacy.   Hari Hernández RN  [facility] Triage Nurse

## 2022-02-03 NOTE — TELEPHONE ENCOUNTER
Patient Contact    Attempt # 2    Was call answered?  No.  Unable to leave message. Mailbox full

## 2022-02-03 NOTE — TELEPHONE ENCOUNTER
Patient called back states he is not having any issues, cancelling appointment on 2/14 patient given number to schedule colonoscopy    Hari Hernández RN

## 2022-02-08 DIAGNOSIS — I10 ESSENTIAL HYPERTENSION, BENIGN: ICD-10-CM

## 2022-02-08 DIAGNOSIS — E10.9 TYPE 1 DIABETES MELLITUS WITHOUT COMPLICATION (H): ICD-10-CM

## 2022-02-09 RX ORDER — LISINOPRIL 20 MG/1
20 TABLET ORAL 2 TIMES DAILY
Qty: 180 TABLET | Refills: 2 | Status: SHIPPED | OUTPATIENT
Start: 2022-02-09 | End: 2022-07-06

## 2022-04-12 ENCOUNTER — OFFICE VISIT (OUTPATIENT)
Dept: INTERNAL MEDICINE | Facility: CLINIC | Age: 52
End: 2022-04-12
Payer: COMMERCIAL

## 2022-04-12 VITALS
RESPIRATION RATE: 15 BRPM | WEIGHT: 228 LBS | SYSTOLIC BLOOD PRESSURE: 106 MMHG | DIASTOLIC BLOOD PRESSURE: 70 MMHG | OXYGEN SATURATION: 95 % | HEIGHT: 71 IN | TEMPERATURE: 97.3 F | HEART RATE: 70 BPM | BODY MASS INDEX: 31.92 KG/M2

## 2022-04-12 DIAGNOSIS — E78.5 HYPERLIPIDEMIA LDL GOAL <100: ICD-10-CM

## 2022-04-12 DIAGNOSIS — I10 ESSENTIAL HYPERTENSION, BENIGN: ICD-10-CM

## 2022-04-12 DIAGNOSIS — Z96.41 INSULIN PUMP STATUS: ICD-10-CM

## 2022-04-12 DIAGNOSIS — E10.9 TYPE 1 DIABETES MELLITUS WITHOUT COMPLICATION (H): Primary | ICD-10-CM

## 2022-04-12 DIAGNOSIS — Z12.11 SCREEN FOR COLON CANCER: ICD-10-CM

## 2022-04-12 PROCEDURE — 99214 OFFICE O/P EST MOD 30 MIN: CPT | Performed by: INTERNAL MEDICINE

## 2022-04-12 ASSESSMENT — ENCOUNTER SYMPTOMS
EYE PAIN: 0
WEAKNESS: 0
HEMATURIA: 0
SORE THROAT: 0
COUGH: 0
HEARTBURN: 0
FEVER: 0
DYSURIA: 0
MYALGIAS: 0
DIZZINESS: 0
JOINT SWELLING: 0
PALPITATIONS: 0
HEMATOCHEZIA: 0
NERVOUS/ANXIOUS: 0
ABDOMINAL PAIN: 0
CONSTIPATION: 0
ARTHRALGIAS: 0
HEADACHES: 0
SHORTNESS OF BREATH: 0
NAUSEA: 0
FREQUENCY: 0
CHILLS: 0
PARESTHESIAS: 0
DIARRHEA: 0

## 2022-04-12 NOTE — PROGRESS NOTES
SUBJECTIVE:   CC: Aldo Sharpe is an 52 year old woman who presents for preventive health visit.   Patient has been advised of split billing requirements and indicates understanding: Yes  Healthy Habits:     Getting at least 3 servings of Calcium per day:  Yes    Bi-annual eye exam:  Yes    Dental care twice a year:  Yes    Sleep apnea or symptoms of sleep apnea:  None    Diet:  Low salt and Diabetic    Frequency of exercise:  2-3 days/week    Duration of exercise:  45-60 minutes    Taking medications regularly:  Yes    Medication side effects:  None    PHQ-2 Total Score: 0    Additional concerns today:  No      PROBLEMS TO ADD ON...  1. FMLA forms for diabetes to be completed   2. Patient needing PA for contour next strips, patient states these are the only test strips that work with his insulin pump     Today's PHQ-2 Score:   PHQ-2 ( 1999 Pfizer) 4/12/2022   Q1: Little interest or pleasure in doing things 0   Q2: Feeling down, depressed or hopeless 0   PHQ-2 Score 0   PHQ-2 Total Score (12-17 Years)- Positive if 3 or more points; Administer PHQ-A if positive -   Q1: Little interest or pleasure in doing things Not at all   Q2: Feeling down, depressed or hopeless Not at all   PHQ-2 Score 0       Abuse: Current or Past (Physical, Sexual or Emotional) - No  Do you feel safe in your environment? Yes        Social History     Tobacco Use     Smoking status: Never Smoker     Smokeless tobacco: Never Used   Substance Use Topics     Alcohol use: No     Comment: none     If you drink alcohol do you typically have >3 drinks per day or >7 drinks per week? No    Alcohol Use 11/18/2021   Prescreen: >3 drinks/day or >7 drinks/week? Not Applicable   Prescreen: >3 drinks/day or >7 drinks/week? -       Reviewed orders with patient.  Reviewed health maintenance and updated orders accordingly - Yes         Reviewed and updated as needed this visit by clinical staff                    Reviewed and updated as needed this visit by  "Provider                     Review of Systems   Constitutional: Negative for chills and fever.   HENT: Negative for congestion, ear pain, hearing loss and sore throat.    Eyes: Negative for pain and visual disturbance.   Respiratory: Negative for cough and shortness of breath.    Cardiovascular: Negative for chest pain, palpitations and peripheral edema.   Gastrointestinal: Negative for abdominal pain, constipation, diarrhea, heartburn, hematochezia and nausea.   Genitourinary: Positive for impotence. Negative for dysuria, frequency, genital sores, hematuria, penile discharge and urgency.   Musculoskeletal: Negative for arthralgias, joint swelling and myalgias.   Skin: Negative for rash.   Neurological: Negative for dizziness, weakness, headaches and paresthesias.   Psychiatric/Behavioral: Negative for mood changes. The patient is not nervous/anxious.         OBJECTIVE:   /70   Pulse 70   Temp 97.3  F (36.3  C) (Temporal)   Resp 15   Ht 1.803 m (5' 11\")   Wt 103.4 kg (228 lb)   SpO2 95%   BMI 31.80 kg/m    Physical Exam  GENERAL: healthy, alert and no distress  EYES: Eyes grossly normal to inspection, PERRL and conjunctivae and sclerae normal  HENT: ear canals and TM's normal, nose and mouth without ulcers or lesions  NECK: no adenopathy, no asymmetry, masses, or scars and thyroid normal to palpation  RESP: lungs clear to auscultation - no rales, rhonchi or wheezes  CV: regular rate and rhythm, normal S1 S2, no S3 or S4, no murmur, click or rub, no peripheral edema and peripheral pulses strong  MS: no gross musculoskeletal defects noted  NEURO: No focal changes  PSYCH: mentation appears normal, affect normal/bright    LDL Cholesterol Calculated   Date Value Ref Range Status   05/28/2021 80 <100 mg/dL Final     Comment:     Desirable:       <100 mg/dl         ASSESSMENT/PLAN:     (E10.9) Type 1 diabetes mellitus without complication (H)  (primary encounter diagnosis)  Comment: Spent extensive time on " "time filling out FMLA forms for patient in regards to his diabetes.  Plan: Prescription sent for his insulin pump    (Z96.41) Insulin pump status  Comment: New prescription sent for specific test strips needed for pump.  Follow-up endocrinology if issues with coverage  Plan: CONTOUR NEXT TEST test strip            (Z12.11) Screen for colon cancer  Comment: ADVISED COLONOSCOPY FOR ROUTINE PREVENTATIVE CARE.  Plan: Adult Gastro Ref - Procedure Only            (I10) Essential hypertension, benign  Comment: Stable on current therapy continuing with medical management  Plan:     (E78.5) Hyperlipidemia LDL goal <100  Comment: Recommend lipid panel due in May.  Orders placed.  Patient will schedule appointment.  Plan: Lipid Profile            Advised patient consider updated COVID vaccine as well as routine boosters as recommended.  He will consider in the future    Patient has been advised of split billing requirements and indicates understanding: Yes    COUNSELING:  Reviewed preventive health counseling, as reflected in patient instructions       Regular exercise       Healthy diet/nutrition    Estimated body mass index is 31.16 kg/m  as calculated from the following:    Height as of 11/18/21: 1.803 m (5' 11\").    Weight as of 1/21/22: 101.3 kg (223 lb 6.4 oz).    Weight management plan: Discussed healthy diet and exercise guidelines    He reports that he has never smoked. He has never used smokeless tobacco.    Counseling Resources:  ATP IV Guidelines  Pooled Cohorts Equation Calculator  Breast Cancer Risk Calculator  BRCA-Related Cancer Risk Assessment: FHS-7 Tool  FRAX Risk Assessment  ICSI Preventive Guidelines  Dietary Guidelines for Americans, 2010  USDA's MyPlate  ASA Prophylaxis  Lung CA Screening    Kaleb Rosenthal MD  [facility]  "

## 2022-05-02 ENCOUNTER — TELEPHONE (OUTPATIENT)
Dept: GASTROENTEROLOGY | Facility: CLINIC | Age: 52
End: 2022-05-02
Payer: COMMERCIAL

## 2022-05-02 NOTE — TELEPHONE ENCOUNTER
Screening Questions  BlueKIND OF PREP RedLOCATION [review exclusion criteria] GreenSEDATION TYPE  1. Have you had a positive covid test in the last 90 days? N     2. Do you have a legal guardian or medical Power of ?  Are you able to give consent for your medical care?Y (Sedation review/consideration needed)    3. Are you active on mychart? Y    4. What insurance is in the chart? Y     3.   Ordering/Referring Provider: Kaleb Rosenthal MD in  INTERNAL MEDICINE    4. BMI 31.8 [BMI OVER 40-EXTENDED PREP]  If greater than 40 review exclusion criteria [PAC APPT IF @ [facility]]        5.  Respiratory Screening :  [If yes to any of the following HOSPITAL setting only]     Do you use daily home oxygen? N    Do you have mod to severe Obstructive Sleep Apnea? N  [OKAY @ [facility] SH PH RI]   Do you have Pulmonary Hypertension? N     Do you have UNCONTROLLED asthma? N        6.   Have you had a heart or lung transplant? N      7.   Are you currently on dialysis? N [ If yes, G-PREP & HOSPITAL setting only]     8.   Do you have chronic kidney disease? Y STAGE 2 [ If yes, G-PREP ]    9.   Have you had a stroke or Transient ischemic attack (TIA - aka  mini stroke ) within 6 months?  N (If yes, please review exclusion criteria)    10.   In the past 6 months, have you had any heart related issues including cardiomyopathy or heart attack? N           If yes, did it require cardiac stenting or other implantable device? N      11.   Do you have any implantable devices in your body (pacemaker, defib, LVAD)? N (If yes, please review exclusion criteria)    12.   Do you take nitroglycerin? N           If yes, how often? N  (if yes, HOSPITAL setting ONLY)    13.   Are you currently taking any blood thinners? N           [IF YES, INFORM PATIENT TO FOLLOW UP W/ ORDERING PROVIDER FOR BRIDGING INSTRUCTIONS]     14.   Do you have a diagnosis of diabetes? Y   [ If yes, G-PREP ]    15.   [FEMALES] Are you currently pregnant?     If yes,  how many weeks?     16.   Are you taking any prescription pain medications on a routine schedule?  N  [ If yes, EXTENDED PREP.] [If yes, MAC]    17.   Do you have any chemical dependencies such as alcohol, street drugs, or methadone?  N [If yes, MAC]    18.   Do you have any history of post-traumatic stress syndrome, severe anxiety or history of psychosis?  N  [If yes, MAC]    19.   Do you transfer independently?  Y    20.  On a regular basis do you go 3-5 days between bowel movements? N [ If yes, EXTENDED PREP.]    21.   Preferred LOCAL Pharmacy for Pre Prescription        [id number], [place] - [address]    Scheduling Details    Caller : Aldo  (Please ask for phone number if not scheduled by patient)    Type of Procedure Scheduled: colonoscopy  Which Colonoscopy Prep was Sent?: leia BYRD CF PATIENTS & GROEN'S PATIENTS NEEDS EXTENDED PREP  Surgeon: NIECY Garza  Date of Procedure: 6/14  Location:       Sedation Type: CS  Conscious Sedation- Needs  for 6 hours after the procedure  MAC/General-Needs  for 24 hours after procedure    Pre-op Required at [facility] [place], [place] and OR for MAC sedation: n  (advise patient they will need a pre-op prior to procedure -)      Informed patient they will need an adult  y  Cannot take any type of public or medical transportation alone    Pre-Procedure Cov[facility] or Externally: [facility] Nurse will call to complete assessment y    Additional comments: none

## 2022-05-03 ENCOUNTER — VIRTUAL VISIT (OUTPATIENT)
Dept: ENDOCRINOLOGY | Facility: CLINIC | Age: 52
End: 2022-05-03
Payer: COMMERCIAL

## 2022-05-03 DIAGNOSIS — Z96.41 INSULIN PUMP STATUS: ICD-10-CM

## 2022-05-03 DIAGNOSIS — E10.21 TYPE 1 DIABETES MELLITUS WITH DIABETIC NEPHROPATHY (H): Primary | ICD-10-CM

## 2022-05-03 PROCEDURE — 95251 CONT GLUC MNTR ANALYSIS I&R: CPT | Performed by: INTERNAL MEDICINE

## 2022-05-03 PROCEDURE — 99213 OFFICE O/P EST LOW 20 MIN: CPT | Mod: 95 | Performed by: INTERNAL MEDICINE

## 2022-05-03 NOTE — LETTER
5/3/2022         RE: Aldo Sharpe  [address] [place] [id number]        Dear Colleague,    Thank you for referring your patient, Aldo Sharpe, to the [facility]. Please see a copy of my visit note below.    Patient is being evaluated via a billable video visit.      How would you like to obtain your AVS? Reviewed verbally  If the video visit is dropped, the invitation should be resent by cell phone  Will anyone else be joining your video visit? no      Video Start Time: 1:00 pm     Video-Visit Details    Type of service:  Video Visit    Video End Time:  1:17 pm    Originating Location (pt. Location): home    Distant Location (provider location):  [facility]/home    Platform used for Video Visit:  Simple Star        Recent issues:  Diabetes follow-up evaluation  Patient continues with the Medtronic 670G pump and Guardian3 sensor  Started Farxiga med per nephrologist, noticed less swelling and also wt loss  Reviewed medical history from patient and Epic chart record         1985. Diagnosis of diabetes mellitus age 15, living in [facility]  Symptoms of increased thirst, feeling unwell  Medical evaluation and lab testing showed high glucose level  Began treatment with diabetes pills, then transitioned to insulin 1-1.5 yrs later  Had taken Lantus, Humalog insulins  Medical evaluations with Dr. Kaleb Rosenthal/[facility]  Recalls problems with armando phenomenon  Hospitalization for DKA: none  1/2019. Switched to Medtronic insulin pump     Previous  hgbA1c trends include:     Lab Test 08/26/20  0854 01/07/20  1721 10/09/19  1231 06/20/19  0925 03/20/19  1333   A1C 6.7* 6.6* 6.9* 6.8* 7.0*     Using the Medtronic 670G pump and also metformin, uses Guardian3 sensor regularly        5/5/21. Initial diabetes evaluation with me at [place]  Reviewed health history and diabetes management  Initial labs included high UCR, then 24-hr urine  showing proteinuria up to 2 g/g Cr    8/2021. Nephrology evaluations with Dr. Bethany Rodrigez/[facility] [place]  Started low sodium diet, also Farxiga 5 mg daily, dose reduction metformin as 500 mg BID  Purchased home BP monitor, also started participation with [employer] study that includes use of Apple Watch  Patient feeling better overall, better energy and some wt loss    Using the Medtronic 670G pump with Guardian3 sensor  Current pump brand:   Novolog in pump   Metformin 500 mg morning and evening   Farxiga 5 mg in evening    Blood glucose (BG) meter:  Contour Next, also the Livongo meter   Tests up to 8x/day    Current pump settings:      Recent Medtronic Carelink data:                Recent [facility] labs include:  Lab Results   Component Value Date    A1C 6.7 (H) 12/28/2021     01/21/2022    POTASSIUM 4.5 01/21/2022    CHLORIDE 104 01/21/2022    CO2 27 01/21/2022    ANIONGAP 8 01/21/2022     (H) 01/21/2022    BUN 24 01/21/2022    CR 1.14 01/21/2022    GFRESTIMATED 78 01/21/2022    GFRESTBLACK >90 05/28/2021    MAGGIE 8.9 01/21/2022    CPEPT <0.1 (L) 05/28/2021    CHOL 145 05/28/2021    TRIG 90 05/28/2021    HDL 47 05/28/2021    LDL 80 05/28/2021    NHDL 98 05/28/2021    UCRR 75 01/21/2022    MICROL 1,260 05/28/2021    UMALCR 1,792.32 (H) 05/28/2021    TSH 3.23 10/08/2021    T4 1.08 08/26/2020     Lab Results   Component Value Date    TSH 3.23 10/08/2021    T4 1.08 08/26/2020    TPO 53 (H) 10/08/2021       Last eye exam 12/2020, no DR  DM Complications:     Nephropathy:    proteinuria      Lives in [place], [place], works as [employer]   Sees Dr. Kaleb Rosenthal/[facility] for general medicine evaluations.  Also sees Dr. Bethany Rodrigez/[facility] Neph[facility]    PMH/PSH:  Past Medical History:   Diagnosis Date     Diabetic eye exam (H) 03/18/2014     Hyperlipidemia LDL goal <100 10/31/2010     Right foot pain      Type 1 diabetes mellitus with complications (H)     nephropathy     No past surgical history on  file.    Family Hx:  No family history on file.      Social Hx:  Social History     Socioeconomic History     Marital status:      Spouse name: Not on file     Number of children: Not on file     Years of education: Not on file     Highest education level: Not on file   Occupational History     Not on file   Tobacco Use     Smoking status: Never Smoker     Smokeless tobacco: Never Used   Substance and Sexual Activity     Alcohol use: No     Comment: none     Drug use: No     Sexual activity: Yes     Partners: Female   Other Topics Concern     Parent/sibling w/ CABG, MI or angioplasty before 65F 55M? Not Asked   Social History Narrative     Not on file     Social Determinants of Health     Financial Resource Strain: Not on file   Food Insecurity: Not on file   Transportation Needs: Not on file   Physical Activity: Not on file   Stress: Not on file   Social Connections: Not on file   Intimate Partner Violence: Not on file   Housing Stability: Not on file          MEDICATIONS:  has a current medication list which includes the following prescription(s): amlodipine, aspirin, dapagliflozin, insulin aspart, insulin glargine, lisinopril, metformin, omega 3, sildenafil, simvastatin, b-d u/f, contour next test, and glucagon emergency.    ROS:     ROS: 10 point ROS neg other than the symptoms noted above in the HPI.    GENERAL: mild fatigue, wt loss; denies fevers, chills, night sweats.   HEENT: no dysphagia, odonophagia, diplopia, neck pain  THYROID:  no apparent hyper or hypothyroid symptoms  CV: no chest pain, pressure, palpitations  LUNGS: no SOB, MCMANUS, cough, wheezing   ABDOMEN: some bloating; no diarrhea, constipation, abdominal pain  EXTREMITIES: no rashes, ulcers, edema  NEUROLOGY: no headaches, denies changes in vision, tingling, extremitiy numbness   MSK:  Right ankle/foot pains; denies muscle weakness  SKIN: no rashes or lesions  : some decreased erection function, uses sildenafil  PSYCH:  stable mood, no  significant anxiety or depression  ENDOCRINE: no heat or cold intolerance    Physical Exam (visual exam)  VS:  no vital signs taken for video visit  CONSTITUTIONAL: healthy, alert and NAD, well dressed, answering questions appropriately  ENT: no nose swelling or nasal discharge, mouth redness or gum changes.  EYES: eyes grossly normal to inspection, conjunctivae and sclerae normal, no exophthalmos or proptosis  THYROID:  no apparent nodules or goiter  LUNGS: no audible wheeze, cough or visible cyanosis, no visible retractions or increased work of breathing  ABDOMEN: abdomen not evaluated  EXTREMITIES: no hand tremors, limited exam  NEUROLOGY: CN grossly intact, mentation intact and speech normal   SKIN:  no apparent skin lesions, rash, or edema with visualized skin appearance  PSYCH: mentation appears normal, affect normal/bright, judgement and insight intact,   normal speech and appearance well groomed      LABS:    All pertinent notes, labs, and images personally reviewed by me.     A/P:  Encounter Diagnoses   Name Primary?     Type 1 diabetes mellitus with diabetic nephropathy (H) Yes     Insulin pump status        Comments:  Reviewed health history and diabetes issues.  Difficult to assess glycemic control without recent glucose trend information  Reviewed and interpreted tests that I previously ordered.   Ordered appropriate tests for the endocrinology disease management.    Management options discussed and implemented after shared medical decision making with the patient.  The T1DM problem is chronic-stable    Plan:  Reviewed the overall T1DM management and insulin pump use.  Discussed optimal BG testing to assess glucose trends.  We reviewed insulin pump settings, basal rate and bolus dosing  Use of automated pump bolus dosing for meal/snack carb & correction dosing  Reviewed the value of reviewing the Medtronic Carelink report datal.    Recommend:  Continue the current Medtronic insulin pump and Guardian3  sensor diabetes management plan  Reasonable to continue lower dose metformin and Farxiga medications  No pump setting changes at this time    Continue to use the Guardian3 sensor in auto-mode consistently  Discussed his previous frustrations with the Guardian3 sensors and calibration problems, yet no recent issues using replacement transmitter   Need repeat lab testing soon   Discussed nearby [facility] lab   Lab order placed  Keep focus on diet, exercise, and weight management  Agree with low sodium diet  Discussed the plan to use Temp Target or to reduce pump basal rate if/when colonoscopy prep  Continue the BP and lipid medication plan  Need close monitoring of kidney function, eGFR, BP  Arrange annual dilated eye exam, fasting lipid panel testing.  Discussed importance of Medtronic pump upload at home or with nearby CDE, prior to appointments    Keep follow-up nephrology appointments to assist with kidney function, antihypertensive management.  Addressed patient's questions today.    There are no Patient Instructions on file for this visit.    Future labs ordered today:   Orders Placed This Encounter   Procedures     GLUCOSE MONITOR, 72 HOUR, PHYS INTERP     Hemoglobin A1c     Radiology/Consults ordered today: None    Total time spent in with the patient evaluation:  17 min  Additional time spent reviewing pertinent lab tests and chart notes, and documentation:  6 min     Follow-up:  9/2022    NIRALI Olivares MD, MS  Endocrinology  [facility]    CC: KEELEY Rosenthal, KARINA Rodrigez, MASHA Gutierres, JHON Garza              Again, thank you for allowing me to participate in the care of your patient.        Sincerely,        Scar Olivares MD

## 2022-05-03 NOTE — PROGRESS NOTES
Patient is being evaluated via a billable video visit.      How would you like to obtain your AVS? Reviewed verbally  If the video visit is dropped, the invitation should be resent by cell phone  Will anyone else be joining your video visit? no      Video Start Time: 1:00 pm     Video-Visit Details    Type of service:  Video Visit    Video End Time:  1:17 pm    Originating Location (pt. Location): home    Distant Location (provider location):  [facility] [place]/home    Platform used for Video Visit:  IntelliWheels        Recent issues:  Diabetes follow-up evaluation  Patient continues with the Medtronic 670G pump and Guardian3 sensor  Started Farxiga med per nephrologist, noticed less swelling and also wt loss  Reviewed medical history from patient and Epic chart record         1985. Diagnosis of diabetes mellitus age 15, living in [facility]  Symptoms of increased thirst, feeling unwell  Medical evaluation and lab testing showed high glucose level  Began treatment with diabetes pills, then transitioned to insulin 1-1.5 yrs later  Had taken Lantus, Humalog insulins  Medical evaluations with Dr. Kaleb Rosenthal/[facility]  Recalls problems with armando phenomenon  Hospitalization for DKA: none  1/2019. Switched to Medtronic insulin pump     Previous  hgbA1c trends include:     Lab Test 08/26/20  0854 01/07/20  1721 10/09/19  1231 06/20/19  0925 03/20/19  1333   A1C 6.7* 6.6* 6.9* 6.8* 7.0*     Using the Medtronic 670G pump and also metformin, uses Guardian3 sensor regularly        5/5/21. Initial diabetes evaluation with me at [place]  Reviewed health history and diabetes management  Initial labs included high UCR, then 24-hr urine showing proteinuria up to 2 g/g Cr    8/2021. Nephrology evaluations with Dr. Bethany Rodrigez/[facility] [place]  Started low sodium diet, also Farxiga 5 mg daily, dose reduction metformin as 500 mg BID  Purchased home BP monitor, also started participation with  [employer] study that includes use of Apple Watch  Patient feeling better overall, better energy and some wt loss    Using the Medtronic 670G pump with Guardian3 sensor  Current pump brand:   Novolog in pump   Metformin 500 mg morning and evening   Farxiga 5 mg in evening    Blood glucose (BG) meter:  Contour Next, also the Livongo meter   Tests up to 8x/day    Current pump settings:      Recent Medtronic Carelink data:                Recent FV labs include:  Lab Results   Component Value Date    A1C 6.7 (H) 12/28/2021     01/21/2022    POTASSIUM 4.5 01/21/2022    CHLORIDE 104 01/21/2022    CO2 27 01/21/2022    ANIONGAP 8 01/21/2022     (H) 01/21/2022    BUN 24 01/21/2022    CR 1.14 01/21/2022    GFRESTIMATED 78 01/21/2022    GFRESTBLACK >90 05/28/2021    MAGGIE 8.9 01/21/2022    CPEPT <0.1 (L) 05/28/2021    CHOL 145 05/28/2021    TRIG 90 05/28/2021    HDL 47 05/28/2021    LDL 80 05/28/2021    NHDL 98 05/28/2021    UCRR 75 01/21/2022    MICROL 1,260 05/28/2021    UMALCR 1,792.32 (H) 05/28/2021    TSH 3.23 10/08/2021    T4 1.08 08/26/2020     Lab Results   Component Value Date    TSH 3.23 10/08/2021    T4 1.08 08/26/2020    TPO 53 (H) 10/08/2021       Last eye exam 12/2020, no DR  DM Complications:     Nephropathy:    proteinuria      Lives in [place], [place], works as [employer]   Sees Dr. Kaleb [facility]enthal/[facility] for general medicine evaluations.  Also sees Dr. Bethany Rodrigez/[facility] Nephrology    PMH/PSH:  Past Medical History:   Diagnosis Date     Diabetic eye exam (H) 03/18/2014     Hyperlipidemia LDL goal <100 10/31/2010     Right foot pain      Type 1 diabetes mellitus with complications (H)     nephropathy     No past surgical history on file.    Family Hx:  No family history on file.      Social Hx:  Social History     Socioeconomic History     Marital status:      Spouse name: Not on file     Number of children: Not on file     Years of education: Not on file     Highest  education level: Not on file   Occupational History     Not on file   Tobacco Use     Smoking status: Never Smoker     Smokeless tobacco: Never Used   Substance and Sexual Activity     Alcohol use: No     Comment: none     Drug use: No     Sexual activity: Yes     Partners: Female   Other Topics Concern     Parent/sibling w/ CABG, MI or angioplasty before 65F 55M? Not Asked   Social History Narrative     Not on file     Social Determinants of Health     Financial Resource Strain: Not on file   Food Insecurity: Not on file   Transportation Needs: Not on file   Physical Activity: Not on file   Stress: Not on file   Social Connections: Not on file   Intimate Partner Violence: Not on file   Housing Stability: Not on file          MEDICATIONS:  has a current medication list which includes the following prescription(s): amlodipine, aspirin, dapagliflozin, insulin aspart, insulin glargine, lisinopril, metformin, omega 3, sildenafil, simvastatin, b-d u/f, contour next test, and glucagon emergency.    ROS:     ROS: 10 point ROS neg other than the symptoms noted above in the HPI.    GENERAL: mild fatigue, wt loss; denies fevers, chills, night sweats.   HEENT: no dysphagia, odonophagia, diplopia, neck pain  THYROID:  no apparent hyper or hypothyroid symptoms  CV: no chest pain, pressure, palpitations  LUNGS: no SOB, MCMANUS, cough, wheezing   ABDOMEN: some bloating; no diarrhea, constipation, abdominal pain  EXTREMITIES: no rashes, ulcers, edema  NEUROLOGY: no headaches, denies changes in vision, tingling, extremitiy numbness   MSK:  Right ankle/foot pains; denies muscle weakness  SKIN: no rashes or lesions  : some decreased erection function, uses sildenafil  PSYCH:  stable mood, no significant anxiety or depression  ENDOCRINE: no heat or cold intolerance    Physical Exam (visual exam)  VS:  no vital signs taken for video visit  CONSTITUTIONAL: healthy, alert and NAD, well dressed, answering questions appropriately  ENT: no  nose swelling or nasal discharge, mouth redness or gum changes.  EYES: eyes grossly normal to inspection, conjunctivae and sclerae normal, no exophthalmos or proptosis  THYROID:  no apparent nodules or goiter  LUNGS: no audible wheeze, cough or visible cyanosis, no visible retractions or increased work of breathing  ABDOMEN: abdomen not evaluated  EXTREMITIES: no hand tremors, limited exam  NEUROLOGY: CN grossly intact, mentation intact and speech normal   SKIN:  no apparent skin lesions, rash, or edema with visualized skin appearance  PSYCH: mentation appears normal, affect normal/bright, judgement and insight intact,   normal speech and appearance well groomed      LABS:    All pertinent notes, labs, and images personally reviewed by me.     A/P:  Encounter Diagnoses   Name Primary?     Type 1 diabetes mellitus with diabetic nephropathy (H) Yes     Insulin pump status        Comments:  Reviewed health history and diabetes issues.  Difficult to assess glycemic control without recent glucose trend information  Reviewed and interpreted tests that I previously ordered.   Ordered appropriate tests for the endocrinology disease management.    Management options discussed and implemented after shared medical decision making with the patient.  The T1DM problem is chronic-stable    Plan:  Reviewed the overall T1DM management and insulin pump use.  Discussed optimal BG testing to assess glucose trends.  We reviewed insulin pump settings, basal rate and bolus dosing  Use of automated pump bolus dosing for meal/snack carb & correction dosing  Reviewed the value of reviewing the Medtronic Carelink report datal.    Recommend:  Continue the current Medtronic insulin pump and Guardian3 sensor diabetes management plan  Reasonable to continue lower dose metformin and Farxiga medications  No pump setting changes at this time    Continue to use the Guardian3 sensor in auto-mode consistently  Discussed his previous frustrations with  the Guardian3 sensors and calibration problems, yet no recent issues using replacement transmitter   Need repeat lab testing soon   Discussed nearby [facility] lab   Lab order placed  Keep focus on diet, exercise, and weight management  Agree with low sodium diet  Discussed the plan to use Temp Target or to reduce pump basal rate if/when colonoscopy prep  Continue the BP and lipid medication plan  Need close monitoring of kidney function, eGFR, BP  Arrange annual dilated eye exam, fasting lipid panel testing.  Discussed importance of Medtronic pump upload at home or with nearby CDE, prior to appointments    Keep follow-up nephrology appointments to assist with kidney function, antihypertensive management.  Addressed patient's questions today.    There are no Patient Instructions on file for this visit.    Future labs ordered today:   Orders Placed This Encounter   Procedures     GLUCOSE MONITOR, 72 HOUR, PHYS INTERP     Hemoglobin A1c     Radiology/Consults ordered today: None    Total time spent in with the patient evaluation:  17 min  Additional time spent reviewing pertinent lab tests and chart notes, and documentation:  6 min     Follow-up:  9/2022    NIRALI Olivares MD, MS  Endocrinology  [facility]    CC: KEELEY Rosenthal, KARINA Rodrigez, MASHA Gutierres, Greg J

## 2022-06-04 ENCOUNTER — TELEPHONE (OUTPATIENT)
Dept: GASTROENTEROLOGY | Facility: CLINIC | Age: 52
End: 2022-06-04
Payer: COMMERCIAL

## 2022-06-04 DIAGNOSIS — Z12.11 ENCOUNTER FOR SCREENING COLONOSCOPY: Primary | ICD-10-CM

## 2022-06-04 RX ORDER — BISACODYL 5 MG/1
TABLET, DELAYED RELEASE ORAL
Qty: 4 TABLET | Refills: 0 | Status: SHIPPED | OUTPATIENT
Start: 2022-06-04 | End: 2022-08-05

## 2022-06-04 NOTE — TELEPHONE ENCOUNTER
Patient scheduled for colonoscopy on 6.14.2022.     Arrival time: 1015    Facility location:     Sedation type: CS    Indication for procedure: screening colonoscopy    Bowel prep recommendation: Golytely prep d/t DM    Prep instructions sent via [employer].  Prep prescription sent to    [facility] #[id number] - [place], [place] - [address]    Pre visit planning completed.    Deepali Valdovinos RN

## 2022-06-06 DIAGNOSIS — R80.9 PROTEINURIA, UNSPECIFIED TYPE: ICD-10-CM

## 2022-06-06 NOTE — TELEPHONE ENCOUNTER
Attempted to contact patient for pre assessment questions. No answer.     Left message to return call to [phone] #2    Discuss at home rapid antigen COVID test 1-2 days prior to procedure. PCR Covid test scheduled 6/10/22    Maame Stein RN

## 2022-06-07 RX ORDER — DAPAGLIFLOZIN 5 MG/1
TABLET, FILM COATED ORAL
Qty: 90 TABLET | Refills: 3 | Status: SHIPPED | OUTPATIENT
Start: 2022-06-07 | End: 2023-07-20

## 2022-06-08 ENCOUNTER — TELEPHONE (OUTPATIENT)
Dept: GASTROENTEROLOGY | Facility: CLINIC | Age: 52
End: 2022-06-08
Payer: COMMERCIAL

## 2022-06-08 NOTE — TELEPHONE ENCOUNTER
Returning pt's call.    Pre assessment questions completed for upcoming colonoscopy procedure scheduled on 6.14.2022    COVID test 6.10.2022; Discussed at home rapid antigen COVID test 1-2 days prior to procedure.    Reviewed procedural arrival time 1015 and facility location .    Designated  policy reviewed. Instructed to have someone stay 6 hours post procedure.     Anticoagulation/blood thinners? no    Electronic implanted devices? no    Reviewed Golytely prep instructions with patient. No fiber/iron supplements or foods that contain nuts/seeds prior to procedure.     Patient verbalized understanding and had no questions or concerns at this time.    Deepali Valdovinos RN

## 2022-06-08 NOTE — TELEPHONE ENCOUNTER
Returning pt's call.    No answer.  Left message to return call [phone] #2    Deepali Valdovinos RN

## 2022-06-08 NOTE — TELEPHONE ENCOUNTER
Second attempt.     Attempted to contact patient for pre assessment questions. No answer.     Left message to return call to [phone] #2    Discuss at home rapid antigen COVID test 1-2 days prior to procedure. PCR test scheduled 6/10/22    Will send [employer]tik message to return call.     Maame Stein RN

## 2022-06-10 ENCOUNTER — LAB (OUTPATIENT)
Dept: URGENT CARE | Facility: URGENT CARE | Age: 52
End: 2022-06-10

## 2022-06-10 ENCOUNTER — LAB (OUTPATIENT)
Dept: LAB | Facility: CLINIC | Age: 52
End: 2022-06-10
Payer: COMMERCIAL

## 2022-06-10 DIAGNOSIS — Z20.822 ENCOUNTER FOR LABORATORY TESTING FOR COVID-19 VIRUS: ICD-10-CM

## 2022-06-10 DIAGNOSIS — E10.21 TYPE 1 DIABETES MELLITUS WITH DIABETIC NEPHROPATHY (H): ICD-10-CM

## 2022-06-10 DIAGNOSIS — Z96.41 INSULIN PUMP STATUS: ICD-10-CM

## 2022-06-10 DIAGNOSIS — Z13.220 SCREENING FOR HYPERLIPIDEMIA: ICD-10-CM

## 2022-06-10 DIAGNOSIS — E10.9 TYPE 1 DIABETES MELLITUS WITHOUT COMPLICATION (H): ICD-10-CM

## 2022-06-10 LAB
CHOLEST SERPL-MCNC: 151 MG/DL
FASTING STATUS PATIENT QL REPORTED: YES
HBA1C MFR BLD: 6.7 % (ref 0–5.6)
HDLC SERPL-MCNC: 48 MG/DL
LDLC SERPL CALC-MCNC: 81 MG/DL
NONHDLC SERPL-MCNC: 103 MG/DL
SARS-COV-2 RNA RESP QL NAA+PROBE: NEGATIVE
TRIGL SERPL-MCNC: 110 MG/DL

## 2022-06-10 PROCEDURE — 83036 HEMOGLOBIN GLYCOSYLATED A1C: CPT

## 2022-06-10 PROCEDURE — 36415 COLL VENOUS BLD VENIPUNCTURE: CPT

## 2022-06-10 PROCEDURE — U0003 INFECTIOUS AGENT DETECTION BY NUCLEIC ACID (DNA OR RNA); SEVERE ACUTE RESPIRATORY SYNDROME CORONAVIRUS 2 (SARS-COV-2) (CORONAVIRUS DISEASE [COVID-19]), AMPLIFIED PROBE TECHNIQUE, MAKING USE OF HIGH THROUGHPUT TECHNOLOGIES AS DESCRIBED BY CMS-2020-01-R: HCPCS

## 2022-06-10 PROCEDURE — U0005 INFEC AGEN DETEC AMPLI PROBE: HCPCS

## 2022-06-10 PROCEDURE — 80061 LIPID PANEL: CPT

## 2022-06-14 ENCOUNTER — HOSPITAL ENCOUNTER (OUTPATIENT)
Facility: CLINIC | Age: 52
Discharge: HOME | End: 2022-06-14
Attending: INTERNAL MEDICINE | Admitting: INTERNAL MEDICINE
Payer: COMMERCIAL

## 2022-06-14 VITALS
OXYGEN SATURATION: 93 % | SYSTOLIC BLOOD PRESSURE: 105 MMHG | BODY MASS INDEX: 31.92 KG/M2 | WEIGHT: 228 LBS | DIASTOLIC BLOOD PRESSURE: 72 MMHG | HEART RATE: 60 BPM | HEIGHT: 71 IN | RESPIRATION RATE: 22 BRPM

## 2022-06-14 DIAGNOSIS — Z12.11 ENCOUNTER FOR SCREENING COLONOSCOPY: Primary | ICD-10-CM

## 2022-06-14 LAB — COLONOSCOPY: NORMAL

## 2022-06-14 PROCEDURE — 250N000011 HC RX IP 250 OP 636: Performed by: INTERNAL MEDICINE

## 2022-06-14 PROCEDURE — 45385 COLONOSCOPY W/LESION REMOVAL: CPT | Performed by: INTERNAL MEDICINE

## 2022-06-14 PROCEDURE — 88305 TISSUE EXAM BY PATHOLOGIST: CPT | Mod: 26 | Performed by: PATHOLOGY

## 2022-06-14 PROCEDURE — G0500 MOD SEDAT ENDO SERVICE >5YRS: HCPCS | Performed by: INTERNAL MEDICINE

## 2022-06-14 PROCEDURE — 88305 TISSUE EXAM BY PATHOLOGIST: CPT | Mod: TC | Performed by: INTERNAL MEDICINE

## 2022-06-14 RX ORDER — NALOXONE HYDROCHLORIDE 0.4 MG/ML
0.2 INJECTION, SOLUTION INTRAMUSCULAR; INTRAVENOUS; SUBCUTANEOUS
Status: DISCONTINUED | OUTPATIENT
Start: 2022-06-14 | End: 2022-06-14 | Stop reason: HOSPADM

## 2022-06-14 RX ORDER — PROCHLORPERAZINE MALEATE 10 MG
10 TABLET ORAL EVERY 6 HOURS PRN
Status: DISCONTINUED | OUTPATIENT
Start: 2022-06-14 | End: 2022-06-14 | Stop reason: HOSPADM

## 2022-06-14 RX ORDER — FENTANYL CITRATE 50 UG/ML
INJECTION, SOLUTION INTRAMUSCULAR; INTRAVENOUS PRN
Status: COMPLETED | OUTPATIENT
Start: 2022-06-14 | End: 2022-06-14

## 2022-06-14 RX ORDER — ONDANSETRON 2 MG/ML
4 INJECTION INTRAMUSCULAR; INTRAVENOUS EVERY 6 HOURS PRN
Status: DISCONTINUED | OUTPATIENT
Start: 2022-06-14 | End: 2022-06-14 | Stop reason: HOSPADM

## 2022-06-14 RX ORDER — ONDANSETRON 4 MG/1
4 TABLET, ORALLY DISINTEGRATING ORAL EVERY 6 HOURS PRN
Status: DISCONTINUED | OUTPATIENT
Start: 2022-06-14 | End: 2022-06-14 | Stop reason: HOSPADM

## 2022-06-14 RX ORDER — NALOXONE HYDROCHLORIDE 0.4 MG/ML
0.4 INJECTION, SOLUTION INTRAMUSCULAR; INTRAVENOUS; SUBCUTANEOUS
Status: DISCONTINUED | OUTPATIENT
Start: 2022-06-14 | End: 2022-06-14 | Stop reason: HOSPADM

## 2022-06-14 RX ORDER — FLUMAZENIL 0.1 MG/ML
0.2 INJECTION, SOLUTION INTRAVENOUS
Status: DISCONTINUED | OUTPATIENT
Start: 2022-06-14 | End: 2022-06-14 | Stop reason: HOSPADM

## 2022-06-14 RX ADMIN — MIDAZOLAM 2 MG: 1 INJECTION INTRAMUSCULAR; INTRAVENOUS at 11:20

## 2022-06-14 RX ADMIN — FENTANYL CITRATE 100 MCG: 50 INJECTION, SOLUTION INTRAMUSCULAR; INTRAVENOUS at 11:20

## 2022-06-14 RX ADMIN — MIDAZOLAM 1 MG: 1 INJECTION INTRAMUSCULAR; INTRAVENOUS at 11:22

## 2022-06-15 LAB
PATH REPORT.COMMENTS IMP SPEC: NORMAL
PATH REPORT.COMMENTS IMP SPEC: NORMAL
PATH REPORT.FINAL DX SPEC: NORMAL
PATH REPORT.GROSS SPEC: NORMAL
PATH REPORT.MICROSCOPIC SPEC OTHER STN: NORMAL
PATH REPORT.RELEVANT HX SPEC: NORMAL
PHOTO IMAGE: NORMAL

## 2022-07-04 DIAGNOSIS — E10.9 TYPE 1 DIABETES MELLITUS WITHOUT COMPLICATION (H): ICD-10-CM

## 2022-07-04 DIAGNOSIS — I10 ESSENTIAL HYPERTENSION, BENIGN: ICD-10-CM

## 2022-07-06 RX ORDER — LISINOPRIL 20 MG/1
20 TABLET ORAL 2 TIMES DAILY
Qty: 180 TABLET | Refills: 2 | Status: SHIPPED | OUTPATIENT
Start: 2022-07-06 | End: 2023-02-14

## 2022-08-03 NOTE — TELEPHONE ENCOUNTER
Routed to , see PA issue and advise if okay to send alternate  Rosailna Wright RN, BSN  Message handled by CLINIC NURSE.     SCM

## 2022-08-05 ENCOUNTER — OFFICE VISIT (OUTPATIENT)
Dept: NEPHROLOGY | Facility: CLINIC | Age: 52
End: 2022-08-05
Payer: COMMERCIAL

## 2022-08-05 ENCOUNTER — LAB (OUTPATIENT)
Dept: LAB | Facility: CLINIC | Age: 52
End: 2022-08-05
Payer: COMMERCIAL

## 2022-08-05 VITALS
DIASTOLIC BLOOD PRESSURE: 76 MMHG | OXYGEN SATURATION: 96 % | HEART RATE: 66 BPM | BODY MASS INDEX: 31.24 KG/M2 | WEIGHT: 224 LBS | SYSTOLIC BLOOD PRESSURE: 116 MMHG

## 2022-08-05 DIAGNOSIS — E10.22 CKD STAGE 2 DUE TO TYPE 1 DIABETES MELLITUS (H): ICD-10-CM

## 2022-08-05 DIAGNOSIS — E10.22 CKD STAGE 2 DUE TO TYPE 1 DIABETES MELLITUS (H): Primary | ICD-10-CM

## 2022-08-05 DIAGNOSIS — N18.2 CKD STAGE 2 DUE TO TYPE 1 DIABETES MELLITUS (H): ICD-10-CM

## 2022-08-05 DIAGNOSIS — N18.2 CKD STAGE 2 DUE TO TYPE 1 DIABETES MELLITUS (H): Primary | ICD-10-CM

## 2022-08-05 DIAGNOSIS — R80.9 PROTEINURIA, UNSPECIFIED TYPE: ICD-10-CM

## 2022-08-05 LAB
ALBUMIN SERPL-MCNC: 3.2 G/DL (ref 3.4–5)
ANION GAP SERPL CALCULATED.3IONS-SCNC: 7 MMOL/L (ref 3–14)
BUN SERPL-MCNC: 19 MG/DL (ref 7–30)
CALCIUM SERPL-MCNC: 9.1 MG/DL (ref 8.5–10.1)
CHLORIDE BLD-SCNC: 106 MMOL/L (ref 94–109)
CO2 SERPL-SCNC: 29 MMOL/L (ref 20–32)
CREAT SERPL-MCNC: 1 MG/DL (ref 0.66–1.25)
CREAT UR-MCNC: 62 MG/DL
GFR SERPL CREATININE-BSD FRML MDRD: >90 ML/MIN/1.73M2
GLUCOSE BLD-MCNC: 190 MG/DL (ref 70–99)
PHOSPHATE SERPL-MCNC: 3.7 MG/DL (ref 2.5–4.5)
POTASSIUM BLD-SCNC: 4.8 MMOL/L (ref 3.4–5.3)
PROT UR-MCNC: 0.9 G/L
PROT/CREAT 24H UR: 1.45 G/G CR (ref 0–0.2)
SODIUM SERPL-SCNC: 142 MMOL/L (ref 133–144)

## 2022-08-05 PROCEDURE — 99214 OFFICE O/P EST MOD 30 MIN: CPT

## 2022-08-05 PROCEDURE — 84156 ASSAY OF PROTEIN URINE: CPT | Performed by: PATHOLOGY

## 2022-08-05 PROCEDURE — 36415 COLL VENOUS BLD VENIPUNCTURE: CPT | Performed by: PATHOLOGY

## 2022-08-05 PROCEDURE — 80069 RENAL FUNCTION PANEL: CPT | Performed by: PATHOLOGY

## 2022-08-05 ASSESSMENT — PAIN SCALES - GENERAL: PAINLEVEL: NO PAIN (0)

## 2022-08-05 NOTE — PROGRESS NOTES
Nephrology Clinic Visit 8/5/22    Assessment and Plan:    1. CKD2 w/proteinuria - Stable. Creat 1.0 eGFR > 90 ml/mn, UPCR 1.4 g/gCr   - Etiology of his CKD is DM/HTN?. Denies h/o retinopathy. Does have proteinuria    - Baseline creat 0.9-1.1 through 2008   - Pr/C elevated today from last visit at 0.4 g/gCr despite excellent glycemic control and excellent b/p control. COVID?   - Previous w/u included Kappa/Lambda normal ratio, SPEP/ immunofixation/Hep/HIV all neg   - On ACE   - Blood pressures well controlled   - DM well controlled with A1c 6.7% ( 6/22)     - Will recheck UPCR next month, if remains elevated will check 24 hr urine for protein     - Ultimately may require renal bx for definitive diagnosis    2. Volume status/blood pressure - Denies edema/dyspnea. Blood pressures 115-119/76-77. Was started on dapagliflozin 8/31 for edema with improvement. Remains on Lisinopril 20 mg bid for blood pressure/proteinuria. Weight 101.6 kg. Was 107.1 kg in 8/21 when Dapagliflozin was started.    - Criteria for using SGLT2 inhibitor therapy in T1DM include: BMI > 27, stable optimized insulin therapy, GFR > 60.    - Will continue current therapy    3. DM1 - On Insulin pump/CGM- well controlled with A1c of 6.7% ( 6/22)   - He remains on Metformin per a previous PCP to help control his Elvira Phenomenon blood sugar problems and post prandial hyperglycemia. This was prior to his insulin pump and CGM which has enabled him to have much better control. It may be helping with appetite suppression/weight loss.    - Dapagliflozin as above   - No hypoglycemia    4. Electrolytes - No acute concerns. K 4.8, Na 142    5. Acid base - No acute concerns. Bicarb 29    6. Disposition - RTC 6 months for follow up with labs prior    Assessment and plan was discussed with patient and he voiced his understanding and agreement.    Reason for Visit:  CKD2 follow up    HPI:  Mr Sharpe is a 53 yo male with CKD2, DM1, HTN, HLD, present today for routine  CKD follow up. Last seen by me on 1/21/22  Baseline creat 0.9-1.1    ROS:   No renal complaints  Feeling well   Is triple Covid vaxed/flu vaxed and contracted COVID 2/22  Denies CP, dyspnea, abdominal concerns  No voiding issues  No edema   Has insulin pump, CGM and performs multiple finger sticks daily to manage his DM  Following a sodium restricted diet ( < 2 g/day)  Energy and appetite are good  Walking 19-20,0000 steps/day with his job    Chronic Health Problems:  DM1 on insulin pump  HTN  HLD  Subclinical hypothyroidism  HLD  COVID 19 ( 2/22)    Family Hx:   No family history on file.     Personal Hx:   , employed as a , NS, ETOH none    Allergies:  No Known Allergies    Medications:  Current Outpatient Medications   Medication Sig     amLODIPine (NORVASC) 10 MG tablet Take 1 tablet (10 mg) by mouth daily     aspirin 81 MG tablet Take 1 tablet (81 mg) by mouth daily     B-D U/F insulin pen needle Use one Syringe 4 times daily or as directed     CONTOUR NEXT TEST test strip Use to test blood sugar 8 times daily or as directed.     FARXIGA 5 MG TABS tablet TAKE 1 TABLET DAILY     insulin aspart (NOVOLOG VIAL) 100 UNITS/ML vial INJECT UP TO  170 UNITS     DAILY VIA INSULIN PUMP     insulin glargine (BASAGLAR KWIKPEN) 100 UNIT/ML pen Inject 32 units subcutaneous every 24 hours in the event of insulin pump failure.     lisinopril (ZESTRIL) 20 MG tablet Take 1 tablet (20 mg) by mouth 2 times daily     metFORMIN (GLUCOPHAGE) 500 MG tablet Take 500 mg by mouth 2 times daily (with meals)     omega 3 1000 MG CAPS Take 3 capsules by mouth daily (Patient taking differently: Take 2 capsules by mouth daily)     sildenafil (VIAGRA) 100 MG tablet Take 1 tablet (100 mg) by mouth daily as needed (erectile dysfunction, no not take with NTG products)     simvastatin (ZOCOR) 40 MG tablet TAKE 1 TABLET BY MOUTH EVERYDAY AT BEDTIME     glucagon (GLUCAGON EMERGENCY) 1 MG kit Inject 1 mg into the muscle once for 1  dose     No current facility-administered medications for this visit.      Vitals:  /76   Pulse 66   Wt 101.6 kg (224 lb)   SpO2 96%   BMI 31.24 kg/m      Exam:  GEN: Pleasant male in NAD  CARDIAC RRR  LUNGS :CTA  ABDOMEN: Non distended  EXT: No edema  NEURO: A/O    LABS:   CMP  Recent Labs   Lab Test 08/05/22  0857 01/21/22  0828 10/08/21  0822 08/13/21  0900 05/28/21  0818 08/26/20  0854 06/20/19  0925 07/10/18  0859    139 137 143 141 138 141 142   POTASSIUM 4.8 4.5 4.6 4.0 4.3 4.1 4.5 3.9   CHLORIDE 106 104 101 110* 108 106 107 107   CO2 29 27 29 28 33* 27 25 28   ANIONGAP 7 8 7 5 <1* 5 9 7   * 134* 184* 85 118* 122* 134* 97   BUN 19 24 22 19 17 19 17 16   CR 1.00 1.14 1.19 1.03 1.00 0.94 0.87 0.98   GFRESTIMATED >90 78 71 84 87 >90 >90 82   GFRESTBLACK  --   --   --   --  >90 >90 >90 >90   MAGGIE 9.1 8.9 9.3 9.3 8.9 9.3 8.6 8.9     Recent Labs   Lab Test 12/28/21  0931 08/26/20  0854 06/20/19  0925 07/10/18  0859   BILITOTAL 0.5 0.3 0.5 0.4   ALKPHOS 69 62 62 64   ALT 39 34 27 44   AST 18 14 15 22     CBC  Recent Labs   Lab Test 01/21/22  0828 10/08/21  0822 08/13/21  0900   HGB 15.4 15.3 15.3   WBC 9.2 7.3 9.1   RBC 5.35 5.19 5.19   HCT 47.2 45.4 45.0   MCV 88 88 87   MCH 28.8 29.5 29.5   MCHC 32.6 33.7 34.0   RDW 13.1 12.7 12.9    375 350     URINE STUDIES  Recent Labs   Lab Test 01/21/22  0834 10/08/21  0824 08/13/21  0905   COLOR Straw Straw Yellow   APPEARANCE Clear Clear Slightly Cloudy*   URINEGLC >499* >499* Negative   URINEBILI Negative Negative Negative   URINEKETONE Negative Negative Negative   SG 1.016 1.018 1.020   UBLD Small* Negative Negative   URINEPH 5.0 6.0 5.0   PROTEIN 30 * 30 * >499*   NITRITE Negative Negative Negative   LEUKEST Negative Negative Negative   RBCU <1 <1 2   WBCU <1 <1 1     Recent Labs   Lab Test 08/05/22  0902 01/21/22  0834 10/08/21  0824 08/13/21  0906   UTPG 1.45* 0.41* 0.96* 2.07*     PTH  Recent Labs   Lab Test 08/13/21  0900   PTHI 33      IRON STUDIES  No lab results found.    Cierra Whitley, NP

## 2022-08-05 NOTE — LETTER
8/5/2022       RE: Aldo Sharpe  [address] [facility] [address]     Dear Colleague,    Thank you for referring your patient, Aldo Sharpe, to the [facility] [place] at [facility]. Please see a copy of my visit note below.    Nephrology Clinic Visit 8/5/22    Assessment and Plan:    1. CKD2 w/proteinuria - Stable. Creat 1.0 eGFR > 90 ml/mn, UPCR 1.4 g/gCr   - Etiology of his CKD is DM/HTN?. Denies h/o retinopathy. Does have proteinuria    - Baseline creat 0.9-1.1 through 2008   - Pr/C elevated today from last visit at 0.4 g/gCr despite excellent glycemic control and excellent b/p control. COVID?   - Previous w/u included Kappa/Lambda normal ratio, SPEP/ immunofixation/Hep/HIV all neg   - On ACE   - Blood pressures well controlled   - DM well controlled with A1c 6.7% ( 6/22)     - Will recheck UPCR next month, if remains elevated will check 24 hr urine for protein     - Ultimately may require renal bx for definitive diagnosis    2. Volume status/blood pressure - Denies edema/dyspnea. Blood pressures 115-119/76-77. Was started on dapagliflozin 8/31 for edema with improvement. Remains on Lisinopril 20 mg bid for blood pressure/proteinuria. Weight 101.6 kg. Was 107.1 kg in 8/21 when Dapagliflozin was started.    - Criteria for using SGLT2 inhibitor therapy in T1DM include: BMI > 27, stable optimized insulin therapy, GFR > 60.    - Will continue current therapy    3. DM1 - On Insulin pump/CGM- well controlled with A1c of 6.7% ( 6/22)   - He remains on Metformin per a previous PCP to help control his Elvira Phenomenon blood sugar problems and post prandial hyperglycemia. This was prior to his insulin pump and CGM which has enabled him to have much better control. It may be helping with appetite suppression/weight loss.    - Dapagliflozin as above   - No hypoglycemia    4. Electrolytes - No acute concerns. K 4.8, Na 142    5. Acid base - No  acute concerns. Bicarb 29    6. Disposition - RTC 6 months for follow up with labs prior    Assessment and plan was discussed with patient and he voiced his understanding and agreement.    Reason for Visit:  CKD2 follow up    HPI:  Mr Sharpe is a 51 yo male with CKD2, DM1, HTN, HLD, present today for routine CKD follow up. Last seen by me on 1/21/22  Baseline creat 0.9-1.1    ROS:   No renal complaints  Feeling well   Is triple Covid vaxed/flu vaxed and contracted COVID 2/22  Denies CP, dyspnea, abdominal concerns  No voiding issues  No edema   Has insulin pump, CGM and performs multiple finger sticks daily to manage his DM  Following a sodium restricted diet ( < 2 g/day)  Energy and appetite are good  Walking 19-20,0000 steps/day with his job    Chronic Health Problems:  DM1 on insulin pump  HTN  HLD  Subclinical hypothyroidism  HLD  COVID 19 ( 2/22)    Family Hx:   No family history on file.     Personal Hx:   , employed as a , NS, ETOH none    Allergies:  No Known Allergies    Medications:  Current Outpatient Medications   Medication Sig     amLODIPine (NORVASC) 10 MG tablet Take 1 tablet (10 mg) by mouth daily     aspirin 81 MG tablet Take 1 tablet (81 mg) by mouth daily     B-D U/F insulin pen needle Use one Syringe 4 times daily or as directed     CONTOUR NEXT TEST test strip Use to test blood sugar 8 times daily or as directed.     FARXIGA 5 MG TABS tablet TAKE 1 TABLET DAILY     insulin aspart (NOVOLOG VIAL) 100 UNITS/ML vial INJECT UP TO  170 UNITS     DAILY VIA INSULIN PUMP     insulin glargine (BASAGLAR KWIKPEN) 100 UNIT/ML pen Inject 32 units subcutaneous every 24 hours in the event of insulin pump failure.     lisinopril (ZESTRIL) 20 MG tablet Take 1 tablet (20 mg) by mouth 2 times daily     metFORMIN (GLUCOPHAGE) 500 MG tablet Take 500 mg by mouth 2 times daily (with meals)     omega 3 1000 MG CAPS Take 3 capsules by mouth daily (Patient taking differently: Take 2 capsules by mouth  daily)     sildenafil (VIAGRA) 100 MG tablet Take 1 tablet (100 mg) by mouth daily as needed (erectile dysfunction, no not take with NTG products)     simvastatin (ZOCOR) 40 MG tablet TAKE 1 TABLET BY MOUTH EVERYDAY AT BEDTIME     glucagon (GLUCAGON EMERGENCY) 1 MG kit Inject 1 mg into the muscle once for 1 dose     No current facility-administered medications for this visit.      Vitals:  /76   Pulse 66   Wt 101.6 kg (224 lb)   SpO2 96%   BMI 31.24 kg/m      Exam:  GEN: Pleasant male in NAD  CARDIAC RRR  LUNGS :CTA  ABDOMEN: Non distended  EXT: No edema  NEURO: A/O    LABS:   CMP  Recent Labs   Lab Test 08/05/22  0857 01/21/22  0828 10/08/21  0822 08/13/21  0900 05/28/21  0818 08/26/20  0854 06/20/19  0925 07/10/18  0859    139 137 143 141 138 141 142   POTASSIUM 4.8 4.5 4.6 4.0 4.3 4.1 4.5 3.9   CHLORIDE 106 104 101 110* 108 106 107 107   CO2 29 27 29 28 33* 27 25 28   ANIONGAP 7 8 7 5 <1* 5 9 7   * 134* 184* 85 118* 122* 134* 97   BUN 19 24 22 19 17 19 17 16   CR 1.00 1.14 1.19 1.03 1.00 0.94 0.87 0.98   GFRESTIMATED >90 78 71 84 87 >90 >90 82   GFRESTBLACK  --   --   --   --  >90 >90 >90 >90   MAGGIE 9.1 8.9 9.3 9.3 8.9 9.3 8.6 8.9     Recent Labs   Lab Test 12/28/21  0931 08/26/20  0854 06/20/19  0925 07/10/18  0859   BILITOTAL 0.5 0.3 0.5 0.4   ALKPHOS 69 62 62 64   ALT 39 34 27 44   AST 18 14 15 22     CBC  Recent Labs   Lab Test 01/21/22  0828 10/08/21  0822 08/13/21  0900   HGB 15.4 15.3 15.3   WBC 9.2 7.3 9.1   RBC 5.35 5.19 5.19   HCT 47.2 45.4 45.0   MCV 88 88 87   MCH 28.8 29.5 29.5   MCHC 32.6 33.7 34.0   RDW 13.1 12.7 12.9    375 350     URINE STUDIES  Recent Labs   Lab Test 01/21/22  0834 10/08/21  0824 08/13/21  0905   COLOR Straw Straw Yellow   APPEARANCE Clear Clear Slightly Cloudy*   URINEGLC >499* >499* Negative   URINEBILI Negative Negative Negative   URINEKETONE Negative Negative Negative   SG 1.016 1.018 1.020   UBLD Small* Negative Negative   URINEPH 5.0 6.0 5.0    PROTEIN 30 * 30 * >499*   NITRITE Negative Negative Negative   LEUKEST Negative Negative Negative   RBCU <1 <1 2   WBCU <1 <1 1     Recent Labs   Lab Test 08/05/22  0902 01/21/22  0834 10/08/21  0824 08/13/21  0906   UTPG 1.45* 0.41* 0.96* 2.07*     PTH  Recent Labs   Lab Test 08/13/21  0900   PTHI 33     IRON STUDIES  No lab results found.    Cierra Whitley, NP

## 2022-08-05 NOTE — NURSING NOTE
Chief Complaint   Patient presents with     Follow Up     For kidneys     Blood pressure 116/76, pulse 66, weight 101.6 kg (224 lb), SpO2 96 %.    Erika Rowe, CMA

## 2022-08-18 ENCOUNTER — MEDICAL CORRESPONDENCE (OUTPATIENT)
Dept: HEALTH INFORMATION MANAGEMENT | Facility: CLINIC | Age: 52
End: 2022-08-18

## 2022-08-19 ENCOUNTER — DOCUMENTATION ONLY (OUTPATIENT)
Dept: ENDOCRINOLOGY | Facility: CLINIC | Age: 52
End: 2022-08-19

## 2022-08-19 NOTE — PROGRESS NOTES
Prescription form for pump supplies, diabetic supplies, continuous glucose monitoring.  Form(s) have been completed faxed.  Faxed or mailed to: [phone]  Form(s) in accordion file for 1 month then to scan.    Nata Renteria, [facility]

## 2022-09-06 ENCOUNTER — VIRTUAL VISIT (OUTPATIENT)
Dept: ENDOCRINOLOGY | Facility: CLINIC | Age: 52
End: 2022-09-06
Payer: COMMERCIAL

## 2022-09-06 DIAGNOSIS — Z96.41 INSULIN PUMP STATUS: ICD-10-CM

## 2022-09-06 DIAGNOSIS — E10.21 TYPE 1 DIABETES MELLITUS WITH DIABETIC NEPHROPATHY (H): Primary | ICD-10-CM

## 2022-09-06 PROCEDURE — 99213 OFFICE O/P EST LOW 20 MIN: CPT | Mod: 95 | Performed by: INTERNAL MEDICINE

## 2022-09-06 NOTE — PROGRESS NOTES
Patient is being evaluated via a billable video visit.      How would you like to obtain your AVS? Reviewed verbally  If the video visit is dropped, the invitation should be resent by cell phone  Will anyone else be joining your video visit? no      Video Start Time:  12:55 pm     Video-Visit Details    Type of service:  Video Visit    Video End Time: 1:10 pm    Originating Location (pt. Location): home    Distant Location (provider location):  [facility] [place]home    Platform used for Video Visit:  [employer]          Recent issues:  Diabetes follow-up evaluation  Patient continues with the Medtronic 670G pump and Guardian3 sensor, but no Carelink upload  Continues on the Farxiga med per nephrologist, noticed less swelling and also wt loss  Reviewed medical history from patient and Epic chart record         1985. Diagnosis of diabetes mellitus age 15, living in [facility]  Symptoms of increased thirst, feeling unwell  Medical evaluation and lab testing showed high glucose level  Began treatment with diabetes pills, then transitioned to insulin 1-1.5 yrs later  Had taken Lantus, Humalog insulins  Medical evaluations with Dr. Kaleb Rosenthal/[facility]  Recalls problems with armando phenomenon  Hospitalization for DKA: none  1/2019. Switched to Medtronic insulin pump     Previous  hgbA1c trends include:     Lab Test 08/26/20  0854 01/07/20  1721 10/09/19  1231 06/20/19  0925 03/20/19  1333   A1C 6.7* 6.6* 6.9* 6.8* 7.0*     Using the Medtronic 670G pump and also metformin, uses Guardian3 sensor regularly        5/5/21. Initial diabetes evaluation with me at [place]  Reviewed health history and diabetes management  Initial labs included high UCR, then 24-hr urine showing proteinuria up to 2 g/g Cr    8/2021. Nephrology evaluations with Dr. Bethany Rodrigez/[facility] [place]  Started low sodium diet, also Farxiga 5 mg daily, dose reduction metformin as 500 mg BID  Purchased home BP  monitor, also started participation with [employer] study that includes use of Apple Watch  Patient feeling better overall, better energy and some wt loss    Using the Medtronic 670G pump with Guardian3 sensor  Current pump brand:   Novolog in pump   Metformin 500 mg morning and evening   Farxiga 5 mg in evening    Blood glucose (BG) meter:  Contour Next, also the Livongo meter   Tests up to 8x/day    Previous pump settings:      Recent Medtronic Carelink data:  No info available today    Recent [facility] labs include:  Lab Results   Component Value Date    A1C 6.7 (H) 06/10/2022     08/05/2022    POTASSIUM 4.8 08/05/2022    CHLORIDE 106 08/05/2022    CO2 29 08/05/2022    ANIONGAP 7 08/05/2022     (H) 08/05/2022    BUN 19 08/05/2022    CR 1.00 08/05/2022    GFRESTIMATED >90 08/05/2022    GFRESTBLACK >90 05/28/2021    MAGGIE 9.1 08/05/2022    CPEPT <0.1 (L) 05/28/2021    CHOL 151 06/10/2022    TRIG 110 06/10/2022    HDL 48 06/10/2022    LDL 81 06/10/2022    NHDL 103 06/10/2022    UCRR 62 08/05/2022    MICROL 1,260 05/28/2021    UMALCR 1,792.32 (H) 05/28/2021    TSH 3.23 10/08/2021    T4 1.08 08/26/2020     Lab Results   Component Value Date    TSH 3.23 10/08/2021    T4 1.08 08/26/2020    TPO 53 (H) 10/08/2021     Last eye exam 12/2020, no DR  DM Complications:     Nephropathy:    proteinuria      Lives in [place], [place], works as [employer]   Sees Dr. Kaleb Rosenthal/[facility] for general medicine evaluations.  Also sees Dr. Bethany Rodrigez/[facility] Neph[facility]    PMH/PSH:  Past Medical History:   Diagnosis Date     Diabetic eye exam (H) 03/18/2014     Hyperlipidemia LDL goal <100 10/31/2010     Proteinuria      Right foot pain      Type 1 diabetes mellitus with complications (H)     nephropathy     Past Surgical History:   Procedure Laterality Date     APPENDECTOMY       COLONOSCOPY N/A 6/14/2022    Procedure: COLONOSCOPY, FLEXIBLE, WITH LESION REMOVAL USING SNARE;  Surgeon: Danielito Garza MD;   Location:  GI     ORTHOPEDIC SURGERY         Family Hx:  No family history on file.      Social Hx:  Social History     Socioeconomic History     Marital status:      Spouse name: Not on file     Number of children: Not on file     Years of education: Not on file     Highest education level: Not on file   Occupational History     Not on file   Tobacco Use     Smoking status: Never Smoker     Smokeless tobacco: Never Used   Substance and Sexual Activity     Alcohol use: No     Comment: none     Drug use: No     Sexual activity: Yes     Partners: Female   Other Topics Concern     Parent/sibling w/ CABG, MI or angioplasty before 65F 55M? Not Asked   Social History Narrative     Not on file     Social Determinants of Health     Financial Resource Strain: Not on file   Food Insecurity: Not on file   Transportation Needs: Not on file   Physical Activity: Not on file   Stress: Not on file   Social Connections: Not on file   Intimate Partner Violence: Not on file   Housing Stability: Not on file          MEDICATIONS:  has a current medication list which includes the following prescription(s): amlodipine, aspirin, farxiga, insulin aspart, lisinopril, metformin, omega 3, sildenafil, simvastatin, b-d u/f, contour next test, glucagon emergency, and insulin glargine.    ROS:     ROS: 10 point ROS neg other than the symptoms noted above in the HPI.    GENERAL: mild fatigue, wt stable; denies fevers, chills, night sweats.   HEENT: no dysphagia, odonophagia, diplopia, neck pain  THYROID:  no apparent hyper or hypothyroid symptoms  CV: no chest pain, pressure, palpitations  LUNGS: no SOB, MCMANUS, cough, wheezing   ABDOMEN: some bloating; no diarrhea, constipation, abdominal pain  EXTREMITIES: no rashes, ulcers, edema  NEUROLOGY: no headaches, denies changes in vision, tingling, extremitiy numbness   MSK:  Right ankle/foot pains; denies muscle weakness  SKIN: no rashes or lesions  : some decreased erection function, uses  sildenafil  PSYCH:  stable mood, no significant anxiety or depression  ENDOCRINE: no heat or cold intolerance    Physical Exam (visual exam)  VS:  no vital signs taken for video visit  CONSTITUTIONAL: healthy, alert and NAD, well dressed, answering questions appropriately  ENT: no nose swelling or nasal discharge, mouth redness or gum changes.  EYES: eyes grossly normal to inspection, conjunctivae and sclerae normal, no exophthalmos or proptosis  THYROID:  no apparent nodules or goiter  LUNGS: no audible wheeze, cough or visible cyanosis, no visible retractions or increased work of breathing  ABDOMEN: abdomen not evaluated  EXTREMITIES: no hand tremors, limited exam  NEUROLOGY: CN grossly intact, mentation intact and speech normal   SKIN:  no apparent skin lesions, rash, or edema with visualized skin appearance  PSYCH: mentation appears normal, affect normal/bright, judgement and insight intact,   normal speech and appearance well groomed      LABS:    All pertinent notes, labs, and images personally reviewed by me.     A/P:  Encounter Diagnoses   Name Primary?     Type 1 diabetes mellitus with diabetic nephropathy (H) Yes     Insulin pump status        Comments:  Reviewed health history and diabetes issues.  Difficult to assess glycemic control without recent glucose trend information  Reviewed and interpreted tests that I previously ordered.   Ordered appropriate tests for the endocrinology disease management.    Management options discussed and implemented after shared medical decision making with the patient.  The T1DM problem is chronic-stable    Plan:  Reviewed the overall T1DM management and insulin pump use.  Discussed optimal BG testing to assess glucose trends.  We reviewed insulin pump settings, basal rate and bolus dosing  Use of automated pump bolus dosing for meal/snack carb & correction dosing  Reviewed the value of reviewing the [employer] Carelink report datal.    Recommend:  Continue the current  Medtronic insulin pump and Guardian3 sensor diabetes management plan  Reasonable to continue lower dose (off-label) metformin and Farxiga medications  No pump setting changes at this time    Need to upload pump data to Carelink website before diabetes appointments  Continue to use the Guardian3 sensor in auto-mode consistently   He will request the Medtronic transmitter Rx form to be sent to our office   We have discussed the previous frustrations with the Guardian3 sensors and calibration problems  Need repeat lab testing soon   Discussed nearby [facility] lab   Lab order placed  Keep focus on diet, exercise, and weight management  Agree with low sodium diet  Continue the BP and lipid medication plan  Need close monitoring of kidney function, eGFR, BP  Arrange annual dilated eye exam, fasting lipid panel testing.  Need copy of eye exam report to be faxed to our office, fax # given to diony  Discussed importance of Medtronic pump upload at home or with nearby CDE, prior to appointments    Keep follow-up nephrology appointments to assist with kidney function, antihypertensive management.  Addressed patient's questions today.    There are no Patient Instructions on file for this visit.    Future labs ordered today:   Orders Placed This Encounter   Procedures     Hemoglobin A1c     TSH with free T4 reflex     Radiology/Consults ordered today: None    Total time spent in with the patient evaluation:  15 min  Additional time spent reviewing pertinent lab tests and chart notes, and documentation:  5 min    Follow-up:  12/6/22 at 1pm, Elena Olivares MD, MS  Endocrinology  [facility]    CC: KEELEY Rosenthal and KARINA Rodrigez

## 2022-09-06 NOTE — LETTER
9/6/2022         RE: Aldo Sharpe  [address] [place] [id number]        Dear Colleague,    Thank you for referring your patient, Aldo Sharpe, to the [facility]. Please see a copy of my visit note below.    Patient is being evaluated via a billable video visit.      How would you like to obtain your AVS? Reviewed verbally  If the video visit is dropped, the invitation should be resent by cell phone  Will anyone else be joining your video visit? no      Video Start Time:  12:55 pm     Video-Visit Details    Type of service:  Video Visit    Video End Time: 1:10 pm    Originating Location (pt. Location): home    Distant Location (provider location):  [facility]/home    Platform used for Video Visit:  [employer]          Recent issues:  Diabetes follow-up evaluation  Patient continues with the Medtronic 670G pump and Guardian3 sensor, but no Carelink upload  Continues on the Farxiga med per nephrologist, noticed less swelling and also wt loss  Reviewed medical history from patient and Epic chart record         1985. Diagnosis of diabetes mellitus age 15, living in [facility]  Symptoms of increased thirst, feeling unwell  Medical evaluation and lab testing showed high glucose level  Began treatment with diabetes pills, then transitioned to insulin 1-1.5 yrs later  Had taken Lantus, Humalog insulins  Medical evaluations with Dr. Kaleb Rosenthal/[facility] clinic  Recalls problems with armando phenomenon  Hospitalization for DKA: none  1/2019. Switched to Medtronic insulin pump     Previous  hgbA1c trends include:     Lab Test 08/26/20  0854 01/07/20  1721 10/09/19  1231 06/20/19  0925 03/20/19  1333   A1C 6.7* 6.6* 6.9* 6.8* 7.0*     Using the Medtronic 670G pump and also metformin, uses Guardian3 sensor regularly        5/5/21. Initial diabetes evaluation with me at [place]  Reviewed health history and diabetes management  Initial labs  included high UCR, then 24-hr urine showing proteinuria up to 2 g/g Cr    8/2021. Nephrology evaluations with Dr. Bethany Rodrigez/[facility] [place]  Started low sodium diet, also Farxiga 5 mg daily, dose reduction metformin as 500 mg BID  Purchased home BP monitor, also started participation with [employer] study that includes use of Apple Watch  Patient feeling better overall, better energy and some wt loss    Using the Medtronic 670G pump with Guardian3 sensor  Current pump brand:   Novolog in pump   Metformin 500 mg morning and evening   Farxiga 5 mg in evening    Blood glucose (BG) meter:  Contour Next, also the Livongo meter   Tests up to 8x/day    Previous pump settings:      Recent Medtronic Carelink data:  No info available today    Recent [facility] labs include:  Lab Results   Component Value Date    A1C 6.7 (H) 06/10/2022     08/05/2022    POTASSIUM 4.8 08/05/2022    CHLORIDE 106 08/05/2022    CO2 29 08/05/2022    ANIONGAP 7 08/05/2022     (H) 08/05/2022    BUN 19 08/05/2022    CR 1.00 08/05/2022    GFRESTIMATED >90 08/05/2022    GFRESTBLACK >90 05/28/2021    MAGGIE 9.1 08/05/2022    CPEPT <0.1 (L) 05/28/2021    CHOL 151 06/10/2022    TRIG 110 06/10/2022    HDL 48 06/10/2022    LDL 81 06/10/2022    NHDL 103 06/10/2022    UCRR 62 08/05/2022    MICROL 1,260 05/28/2021    UMALCR 1,792.32 (H) 05/28/2021    TSH 3.23 10/08/2021    T4 1.08 08/26/2020     Lab Results   Component Value Date    TSH 3.23 10/08/2021    T4 1.08 08/26/2020    TPO 53 (H) 10/08/2021     Last eye exam 12/2020, no DR  DM Complications:     Nephropathy:    proteinuria      Lives in [place], [place], works as [employer]   Sees Dr. Kaleb [facility] for general medicine evaluations.  Also sees Dr. Bethany Rodrigez/[facility] Neph[facility]    PMH/PSH:  Past Medical History:   Diagnosis Date     Diabetic eye exam (H) 03/18/2014     Hyperlipidemia LDL goal <100 10/31/2010     Proteinuria      Right foot pain      Type 1 diabetes mellitus  with complications (H)     nephropathy     Past Surgical History:   Procedure Laterality Date     APPENDECTOMY       COLONOSCOPY N/A 6/14/2022    Procedure: COLONOSCOPY, FLEXIBLE, WITH LESION REMOVAL USING SNARE;  Surgeon: Danielito Garza MD;  Location:  GI     ORTHOPEDIC SURGERY         Family Hx:  No family history on file.      Social Hx:  Social History     Socioeconomic History     Marital status:      Spouse name: Not on file     Number of children: Not on file     Years of education: Not on file     Highest education level: Not on file   Occupational History     Not on file   Tobacco Use     Smoking status: Never Smoker     Smokeless tobacco: Never Used   Substance and Sexual Activity     Alcohol use: No     Comment: none     Drug use: No     Sexual activity: Yes     Partners: Female   Other Topics Concern     Parent/sibling w/ CABG, MI or angioplasty before 65F 55M? Not Asked   Social History Narrative     Not on file     Social Determinants of Health     Financial Resource Strain: Not on file   Food Insecurity: Not on file   Transportation Needs: Not on file   Physical Activity: Not on file   Stress: Not on file   Social Connections: Not on file   Intimate Partner Violence: Not on file   Housing Stability: Not on file          MEDICATIONS:  has a current medication list which includes the following prescription(s): amlodipine, aspirin, farxiga, insulin aspart, lisinopril, metformin, omega 3, sildenafil, simvastatin, b-d u/f, contour next test, glucagon emergency, and insulin glargine.    ROS:     ROS: 10 point ROS neg other than the symptoms noted above in the HPI.    GENERAL: mild fatigue, wt stable; denies fevers, chills, night sweats.   HEENT: no dysphagia, odonophagia, diplopia, neck pain  THYROID:  no apparent hyper or hypothyroid symptoms  CV: no chest pain, pressure, palpitations  LUNGS: no SOB, MCMANUS, cough, wheezing   ABDOMEN: some bloating; no diarrhea, constipation, abdominal  pain  EXTREMITIES: no rashes, ulcers, edema  NEUROLOGY: no headaches, denies changes in vision, tingling, extremitiy numbness   MSK:  Right ankle/foot pains; denies muscle weakness  SKIN: no rashes or lesions  : some decreased erection function, uses sildenafil  PSYCH:  stable mood, no significant anxiety or depression  ENDOCRINE: no heat or cold intolerance    Physical Exam (visual exam)  VS:  no vital signs taken for video visit  CONSTITUTIONAL: healthy, alert and NAD, well dressed, answering questions appropriately  ENT: no nose swelling or nasal discharge, mouth redness or gum changes.  EYES: eyes grossly normal to inspection, conjunctivae and sclerae normal, no exophthalmos or proptosis  THYROID:  no apparent nodules or goiter  LUNGS: no audible wheeze, cough or visible cyanosis, no visible retractions or increased work of breathing  ABDOMEN: abdomen not evaluated  EXTREMITIES: no hand tremors, limited exam  NEUROLOGY: CN grossly intact, mentation intact and speech normal   SKIN:  no apparent skin lesions, rash, or edema with visualized skin appearance  PSYCH: mentation appears normal, affect normal/bright, judgement and insight intact,   normal speech and appearance well groomed      LABS:    All pertinent notes, labs, and images personally reviewed by me.     A/P:  Encounter Diagnoses   Name Primary?     Type 1 diabetes mellitus with diabetic nephropathy (H) Yes     Insulin pump status        Comments:  Reviewed health history and diabetes issues.  Difficult to assess glycemic control without recent glucose trend information  Reviewed and interpreted tests that I previously ordered.   Ordered appropriate tests for the endocrinology disease management.    Management options discussed and implemented after shared medical decision making with the patient.  The T1DM problem is chronic-stable    Plan:  Reviewed the overall T1DM management and insulin pump use.  Discussed optimal BG testing to assess glucose  trends.  We reviewed insulin pump settings, basal rate and bolus dosing  Use of automated pump bolus dosing for meal/snack carb & correction dosing  Reviewed the value of reviewing the Medtronic Carelink report datal.    Recommend:  Continue the current Medtronic insulin pump and Guardian3 sensor diabetes management plan  Reasonable to continue lower dose (off-label) metformin and Farxiga medications  No pump setting changes at this time    Need to upload pump data to Carelink website before diabetes appointments  Continue to use the Guardian3 sensor in auto-mode consistently   He will request the Medtronic transmitter Rx form to be sent to our office   We have discussed the previous frustrations with the Guardian3 sensors and calibration problems  Need repeat lab testing soon   Discussed nearby [facility] lab   Lab order placed  Keep focus on diet, exercise, and weight management  Agree with low sodium diet  Continue the BP and lipid medication plan  Need close monitoring of kidney function, eGFR, BP  Arrange annual dilated eye exam, fasting lipid panel testing.  Need copy of eye exam report to be faxed to our office, fax # given to diony  Discussed importance of Medtronic pump upload at home or with nearby CDE, prior to appointments    Keep follow-up nephrology appointments to assist with kidney function, antihypertensive management.  Addressed patient's questions today.    There are no Patient Instructions on file for this visit.    Future labs ordered today:   Orders Placed This Encounter   Procedures     Hemoglobin A1c     TSH with free T4 reflex     Radiology/Consults ordered today: None    Total time spent in with the patient evaluation:  15 min  Additional time spent reviewing pertinent lab tests and chart notes, and documentation:  5 min    Follow-up:  12/6/22 at 1pm, Elena Olivares MD, MS  Endocrinology  [facility]    CC: KEELEY Rosenthal and KARINA Rodrigez              Again,  thank you for allowing me to participate in the care of your patient.        Sincerely,        Scar Olivares MD

## 2022-09-16 ENCOUNTER — TELEPHONE (OUTPATIENT)
Dept: ENDOCRINOLOGY | Facility: CLINIC | Age: 52
End: 2022-09-16

## 2022-09-16 ENCOUNTER — MEDICAL CORRESPONDENCE (OUTPATIENT)
Dept: HEALTH INFORMATION MANAGEMENT | Facility: CLINIC | Age: 52
End: 2022-09-16

## 2022-09-16 NOTE — TELEPHONE ENCOUNTER
[facility] Call Center    Phone Message    May a detailed message be left on voicemail: yes     Reason for Call: Medtronic Following up  on forms faxed to Dr. Olivares for Chart notes and Certificate of Medical Necessity. Needed asap to authorized supplies.   Thank you .     Action Taken: Other: endo    Travel Screening: Not Applicable

## 2022-09-18 DIAGNOSIS — E10.9 TYPE 1 DIABETES MELLITUS WITHOUT COMPLICATION (H): ICD-10-CM

## 2022-09-18 DIAGNOSIS — I10 ESSENTIAL HYPERTENSION, BENIGN: ICD-10-CM

## 2022-09-20 RX ORDER — AMLODIPINE BESYLATE 10 MG/1
TABLET ORAL
Qty: 90 TABLET | Refills: 1 | Status: SHIPPED | OUTPATIENT
Start: 2022-09-20 | End: 2023-02-21

## 2022-09-20 NOTE — TELEPHONE ENCOUNTER
Called pt to notify that we rec'd forms, and we will get everything faxed today.  Pt verbalized understanding.  Pina Sky RN

## 2022-10-06 DIAGNOSIS — N52.9 ERECTILE DYSFUNCTION, UNSPECIFIED ERECTILE DYSFUNCTION TYPE: ICD-10-CM

## 2022-10-10 RX ORDER — SILDENAFIL 100 MG/1
TABLET, FILM COATED ORAL
Qty: 18 TABLET | Refills: 1 | Status: SHIPPED | OUTPATIENT
Start: 2022-10-10 | End: 2023-04-12

## 2022-10-10 NOTE — TELEPHONE ENCOUNTER
Prescription approved per [facility] Refill Protocol.  Callie WHITE RN  [facility]

## 2022-10-12 ENCOUNTER — TELEPHONE (OUTPATIENT)
Dept: ENDOCRINOLOGY | Facility: CLINIC | Age: 52
End: 2022-10-12

## 2022-10-12 NOTE — TELEPHONE ENCOUNTER
Prior Authorization Retail Medication Request    Medication/Dose: Guardian Sensor 3  ICD code (if different than what is on RX):  Insulin pump status [Z96.41]     Insurance Name:  [facility] [employer]  Insurance ID:  [id number]       Pharmacy Information (if different than what is on RX)  Name:  [facility]  Phone:  [phone]

## 2022-10-13 NOTE — TELEPHONE ENCOUNTER
Central Prior Authorization Team   Phone: [phone]      Prior Authorization Approval    Authorization Effective Date: 10/13/2022  Authorization Expiration Date: 10/13/2023  Medication: Guardian Sensor 3--APPROVED  Approved Dose/Quantity:    Reference #:     Insurance Company: [employer] - Phone [phone] Fax [phone]  Expected CoPay:       CoPay Card Available:      Foundation Assistance Needed:    Which Pharmacy is filling the prescription (Not needed for infusion/clinic administered): [facility] - [place], [place] - [address] AT PORTAL TO REGISTERED [facility]  Pharmacy Notified: Yes  Patient Notified: Yes TALKED WITH PATIENT AND HE JUST RECEIVED THEM, HE WILL CHECK TO SEE IF INSURANCE COVERED IT. IF NOT HE WILL CALL THEM TO [facility]

## 2022-10-16 ENCOUNTER — HEALTH MAINTENANCE LETTER (OUTPATIENT)
Age: 52
End: 2022-10-16

## 2022-12-01 NOTE — TELEPHONE ENCOUNTER
75 Please verify why question #1 is being asked?    Also I do not have any concerns with the current medications as listed unless the patient is having side effects and I am unaware of.

## 2022-12-06 ENCOUNTER — TRANSFERRED RECORDS (OUTPATIENT)
Dept: HEALTH INFORMATION MANAGEMENT | Facility: CLINIC | Age: 52
End: 2022-12-06

## 2022-12-06 ENCOUNTER — VIRTUAL VISIT (OUTPATIENT)
Dept: ENDOCRINOLOGY | Facility: CLINIC | Age: 52
End: 2022-12-06
Payer: COMMERCIAL

## 2022-12-06 DIAGNOSIS — Z96.41 INSULIN PUMP STATUS: ICD-10-CM

## 2022-12-06 DIAGNOSIS — E10.3299 TYPE 1 DIABETES MELLITUS WITH BACKGROUND RETINOPATHY (H): ICD-10-CM

## 2022-12-06 DIAGNOSIS — E10.21 TYPE 1 DIABETES MELLITUS WITH DIABETIC NEPHROPATHY (H): Primary | ICD-10-CM

## 2022-12-06 LAB — RETINOPATHY: POSITIVE

## 2022-12-06 PROCEDURE — 99214 OFFICE O/P EST MOD 30 MIN: CPT | Mod: 95 | Performed by: INTERNAL MEDICINE

## 2022-12-06 PROCEDURE — 95251 CONT GLUC MNTR ANALYSIS I&R: CPT | Performed by: INTERNAL MEDICINE

## 2022-12-06 NOTE — PROGRESS NOTES
Patient is being evaluated via a billable video visit.      How would you like to obtain your AVS? Verbally Reviewed  If the video visit is dropped, the invitation should be resent by: Cellphone  Will anyone else be joining your video visit? No        Video-Visit Details    Video Start Time: 1:02 pm    Type of service:  Video Visit    Video End Time:  1:25 pm    Originating Location (pt. Location): Home    PROVIDER LOCATION On-site vs Off-site    Distant Location (provider location):  Home    Platform used for Video Visit: [employer]        Recent issues:  Diabetes follow-up evaluation  Patient continues with the Medtronic 670G pump and Guardian3 sensor, , Farxiga meds  Reviewed medical history from patient and Epic chart record         1984. Diagnosis of diabetes mellitus age 15, living in [facility]  Symptoms of increased thirst, feeling unwell  Medical evaluation and lab testing showed high glucose level  Began treatment with diabetes pills, then transitioned to insulin 1-1.5 yrs later  Had taken Lantus, Humalog insulins  Medical evaluations with Dr. Kaleb Rosenthal/[facility] clinic  Recalls problems with armando phenomenon  Hospitalization for DKA: none  1/2019. Switched to Medtronic insulin pump     Previous  hgbA1c trends include:     Lab Test 08/26/20  0854 01/07/20  1721 10/09/19  1231 06/20/19  0925 03/20/19  1333   A1C 6.7* 6.6* 6.9* 6.8* 7.0*     Using the Medtronic 670G pump and also metformin, uses Guardian3 sensor regularly        5/5/21. Initial diabetes evaluation with me at [place]  Reviewed health history and diabetes management  Initial labs included high UCR, then 24-hr urine showing proteinuria up to 2 g/g Cr    8/2021. Nephrology evaluations with Dr. Bethany Rodrigez/[facility] [place]  Started low sodium diet, also Farxiga 5 mg daily, dose reduction metformin as 500 mg BID  Purchased home BP monitor, also started participation with [employer] study that includes use of Apple Watch  Patient feeling  better overall, better energy and some wt loss    Using the Medtronic 670G pump with Guardian3 sensor  Current pump brand:   Novolog in pump   Metformin 500 mg morning and evening   Farxiga 5 mg in evening    Blood glucose (BG) meter:  Contour Next, also the Livongo meter   Tests up to 8x/day    Previous pump settings:      Recent Medtronic Carelink data:              Recent [facility] labs include:  Lab Results   Component Value Date    A1C 6.7 (H) 06/10/2022     08/05/2022    POTASSIUM 4.8 08/05/2022    CHLORIDE 106 08/05/2022    CO2 29 08/05/2022    ANIONGAP 7 08/05/2022     (H) 08/05/2022    BUN 19 08/05/2022    CR 1.00 08/05/2022    GFRESTIMATED >90 08/05/2022    GFRESTBLACK >90 05/28/2021    MAGGIE 9.1 08/05/2022    CPEPT <0.1 (L) 05/28/2021    CHOL 151 06/10/2022    TRIG 110 06/10/2022    HDL 48 06/10/2022    LDL 81 06/10/2022    NHDL 103 06/10/2022    UCRR 62 08/05/2022    MICROL 1,260 05/28/2021    UMALCR 1,792.32 (H) 05/28/2021    TSH 3.23 10/08/2021    T4 1.08 08/26/2020     Lab Results   Component Value Date    TSH 3.23 10/08/2021    T4 1.08 08/26/2020    TPO 53 (H) 10/08/2021     Last eye exam 12/2022 at [facility], recalls some changes at periphery of eyes  DM Complications:     Nephropathy:    Proteinuria    Takes lisinopril, amlodipine, Farxiga meds    Sees Cierra Whitley CNP/[facility] Nephrology   Retinopathy:    BDR in 12/2022      Lives in [place], [place], works as [employer]   [facility]  Also sees Dr. Bethany Rodrigez/[facility] Nephrology    PMH/PSH:  Past Medical History:   Diagnosis Date     Diabetic eye exam (H) 03/18/2014     Hyperlipidemia LDL goal <100 10/31/2010     Proteinuria      Right foot pain      Type 1 diabetes mellitus with complications (H)     nephropathy     Past Surgical History:   Procedure Laterality Date     APPENDECTOMY       COLONOSCOPY N/A 6/14/2022    Procedure: COLONOSCOPY, FLEXIBLE, WITH  LESION REMOVAL USING SNARE;  Surgeon: Danielito Garza MD;  Location:  GI     ORTHOPEDIC SURGERY         Family Hx:  No family history on file.      Social Hx:  Social History     Socioeconomic History     Marital status:      Spouse name: Not on file     Number of children: Not on file     Years of education: Not on file     Highest education level: Not on file   Occupational History     Not on file   Tobacco Use     Smoking status: Never     Smokeless tobacco: Never   Substance and Sexual Activity     Alcohol use: No     Comment: none     Drug use: No     Sexual activity: Yes     Partners: Female   Other Topics Concern     Parent/sibling w/ CABG, MI or angioplasty before 65F 55M? Not Asked   Social History Narrative     Not on file     Social Determinants of Health     Financial Resource Strain: Not on file   Food Insecurity: Not on file   Transportation Needs: Not on file   Physical Activity: Not on file   Stress: Not on file   Social Connections: Not on file   Intimate Partner Violence: Not on file   Housing Stability: Not on file          MEDICATIONS:  has a current medication list which includes the following prescription(s): amlodipine, aspirin, farxiga, insulin aspart, insulin glargine, lisinopril, metformin, omega 3, sildenafil, simvastatin, b-d u/f, contour next test, and glucagon emergency.    ROS:     ROS: 10 point ROS neg other than the symptoms noted above in the HPI.    GENERAL: mild fatigue, wt stable; denies fevers, chills, night sweats.   HEENT: no dysphagia, odonophagia, diplopia, neck pain  THYROID:  no apparent hyper or hypothyroid symptoms  CV: no chest pain, pressure, palpitations  LUNGS: no SOB, MCMANUS, cough, wheezing   ABDOMEN: some bloating; no diarrhea, constipation, abdominal pain  EXTREMITIES: no rashes, ulcers, edema  NEUROLOGY: no headaches, denies changes in vision, tingling, extremitiy numbness   MSK:  Right ankle/foot pains; denies muscle weakness  SKIN: no rashes or  lesions  : some decreased erection function, uses sildenafil  PSYCH:  stable mood, no significant anxiety or depression  ENDOCRINE: no heat or cold intolerance    Physical Exam (visual exam)  VS:  no vital signs taken for video visit  CONSTITUTIONAL: healthy, alert and NAD, well dressed, answering questions appropriately  ENT: no nose swelling or nasal discharge, mouth redness or gum changes.  EYES: eyes grossly normal to inspection, conjunctivae and sclerae normal, no exophthalmos or proptosis  THYROID:  no apparent nodules or goiter  LUNGS: no audible wheeze, cough or visible cyanosis, no visible retractions or increased work of breathing  ABDOMEN: abdomen not evaluated  EXTREMITIES: no hand tremors, limited exam  NEUROLOGY: CN grossly intact, mentation intact and speech normal   SKIN:  no apparent skin lesions, rash, or edema with visualized skin appearance  PSYCH: mentation appears normal, affect normal/bright, judgement and insight intact,   normal speech and appearance well groomed      LABS:    All pertinent notes, labs, and images personally reviewed by me.     A/P:  Encounter Diagnoses   Name Primary?     Type 1 diabetes mellitus with diabetic nephropathy (H) Yes     Type 1 diabetes mellitus with background retinopathy (H)      Insulin pump status        Comments:  Reviewed health history and diabetes issues.  Recent CGM glucose trends good but post supper hyperglycemia  Reviewed and interpreted tests that I previously ordered.   Ordered appropriate tests for the endocrinology disease management.    Management options discussed and implemented after shared medical decision making with the patient.  T1DM problem is chronic-stable    Plan:  Reviewed the overall T1DM management and insulin pump use.  Discussed optimal BG testing to assess glucose trends.  We reviewed insulin pump settings, basal rate and bolus dosing  Use of automated pump bolus dosing for meal/snack carb & correction dosing  Reviewed the  value of reviewing the Medtronic Carelink report datal.    Recommend:  Continue the current Medtronic insulin pump and Guardian3 sensor diabetes management plan  Reasonable to continue lower dose (off-label) metformin and Farxiga medications  Pump setting changes:   Bolus ICR:  5p 4 to 3.7    Reminded him to upload pump data to Carelink website before diabetes appointments  Continue to use the Guardian3 sensor auto-mode consistently   We have discussed the previous frustrations with the Guardian3 sensors and calibration problems  Need repeat lab testing soon   [facility]   Lab order placed  Keep focus on diet, exercise, and weight management  Agree with low sodium diet  Continue the BP and lipid medication plan  Need close monitoring of kidney function, eGFR, BP  Arrange annual dilated eye exam, fasting lipid panel testing.  Need copy of eye exam report to be faxed to our office, fax # previously given to patient    Keep follow-up nephrology appointments to assist with kidney function, antihypertensive management.  Addressed patient's questions today.    There are no Patient Instructions on file for this visit.    Future labs ordered today:   Orders Placed This Encounter   Procedures     GLUCOSE MONITOR, 72 HOUR, PHYS INTERP     Hemoglobin A1c     Basic metabolic panel     Albumin Random Urine Quantitative with Creat Ratio     TSH     Radiology/Consults ordered today: None    Total time spent on day of encounter:  26 min    Follow-up:  3/28/23 at 1pm, Elena Olivares MD, MS  Endocrinology  [facility]    CC: KEELEY Rosenthal, KARINA Rodrigez, MASHA Whitley

## 2022-12-06 NOTE — Clinical Note
12/6/2022         RE: Aldo Sharpe  [address] [place] [id number]        Dear Colleague,    Thank you for referring your patient, Aldo Sharpe, to the [facility] [place]. Please see a copy of my visit note below.    Patient is being evaluated via a billable video visit.      How would you like to obtain your AVS? Verbally Reviewed  If the video visit is dropped, the invitation should be resent by: Cellphone  Will anyone else be joining your video visit? No  {If patient encounters technical issues they should call [phone] :[id number]}      Video-Visit Details    Video Start Time: {video visit start/end time for provider to select:030048}    Type of service:  Video Visit    Video End Time:  {video visit start/end time for provider to select:962864}    Originating Location (pt. Location): Home    PROVIDER LOCATION On-site vs Off-site    Distant Location (provider location):  Home    Platform used for Video Visit: [employer]        Recent issues:  Diabetes follow-up evaluation  Patient continues with the Medtronic 670G pump and Guardian3 sensor, but no Carelink upload  Continues on the Farxiga med per nephrologist, noticed less swelling and also wt loss  Reviewed medical history from patient and Epic chart record         1985. Diagnosis of diabetes mellitus age 15, living in [facility]  Symptoms of increased thirst, feeling unwell  Medical evaluation and lab testing showed high glucose level  Began treatment with diabetes pills, then transitioned to insulin 1-1.5 yrs later  Had taken Lantus, Humalog insulins  Medical evaluations with Dr. Kaleb Rosenthal/[facility]  Recalls problems with armando phenomenon  Hospitalization for DKA: none  1/2019. Switched to Medtronic insulin pump     Previous FV hgbA1c trends include:     Lab Test 08/26/20  0854 01/07/20  1721 10/09/19  1231 06/20/19  0925 03/20/19  1333   A1C 6.7* 6.6* 6.9* 6.8* 7.0*     Using the Medtronic 670G pump  and also metformin, uses Guardian3 sensor regularly        5/5/21. Initial diabetes evaluation with me at [facility]  Reviewed health history and diabetes management  Initial labs included high UCR, then 24-hr urine showing proteinuria up to 2 g/g Cr    8/2021. Nephrology evaluations with Dr. Bethany Rodrigez/[facility]  Started low sodium diet, also Farxiga 5 mg daily, dose reduction metformin as 500 mg BID  Purchased home BP monitor, also started participation with [employer] study that includes use of Apple Watch  Patient feeling better overall, better energy and some wt loss    Using the Medtronic 670G pump with Guardian3 sensor  Current pump brand:   Novolog in pump   Metformin 500 mg morning and evening   Farxiga 5 mg in evening    Blood glucose (BG) meter:  Contour Next, also the Livongo meter   Tests up to 8x/day    Previous pump settings:      Recent Medtronic Carelink data:              Recent [facility]V labs include:  Lab Results   Component Value Date    A1C 6.7 (H) 06/10/2022     08/05/2022    POTASSIUM 4.8 08/05/2022    CHLORIDE 106 08/05/2022    CO2 29 08/05/2022    ANIONGAP 7 08/05/2022     (H) 08/05/2022    BUN 19 08/05/2022    CR 1.00 08/05/2022    GFRESTIMATED >90 08/05/2022    GFRESTBLACK >90 05/28/2021    MAGGIE 9.1 08/05/2022    CPEPT <0.1 (L) 05/28/2021    CHOL 151 06/10/2022    TRIG 110 06/10/2022    HDL 48 06/10/2022    LDL 81 06/10/2022    NHDL 103 06/10/2022    UCRR 62 08/05/2022    MICROL 1,260 05/28/2021    UMALCR 1,792.32 (H) 05/28/2021    TSH 3.23 10/08/2021    T4 1.08 08/26/2020     Lab Results   Component Value Date    TSH 3.23 10/08/2021    T4 1.08 08/26/2020    TPO 53 (H) 10/08/2021     Last eye exam 12/2022 at [facility], recalls some changes at periphery of eyes  DM Complications:     Nephropathy:    Proteinuria    Takes lisinopril, amlodipine, Farxiga meds    Sees Cierra Gutierrez CNP/[facility]   Retinopathy:    BDR in 12/2022      Lives in [place], [place], 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 Kaleb Rosenthal/[facility] for general medicine evaluations.  Also sees Dr. Bethany Rodrigez/[facility]    PMH/PSH:  Past Medical History:   Diagnosis Date     Diabetic eye exam (H) 03/18/2014     Hyperlipidemia LDL goal <100 10/31/2010     Proteinuria      Right foot pain      Type 1 diabetes mellitus with complications (H)     nephropathy     Past Surgical History:   Procedure Laterality Date     APPENDECTOMY       COLONOSCOPY N/A 6/14/2022    Procedure: COLONOSCOPY, FLEXIBLE, WITH LESION REMOVAL USING SNARE;  Surgeon: Danielito Garza MD;  Location:  GI     ORTHOPEDIC SURGERY         Family Hx:  No family history on file.      Social Hx:  Social History     Socioeconomic History     Marital status:      Spouse name: Not on file     Number of children: Not on file     Years of education: Not on file     Highest education level: Not on file   Occupational History     Not on file   Tobacco Use     Smoking status: Never     Smokeless tobacco: Never   Substance and Sexual Activity     Alcohol use: No     Comment: none     Drug use: No     Sexual activity: Yes     Partners: Female   Other Topics Concern     Parent/sibling w/ CABG, MI or angioplasty before 65F 55M? Not Asked   Social History Narrative     Not on file     Social Determinants of Health     Financial Resource Strain: Not on file   Food Insecurity: Not on file   Transportation Needs: Not on file   Physical Activity: Not on file   Stress: Not on file   Social Connections: Not on file   Intimate Partner Violence: Not on file   Housing Stability: Not on file          MEDICATIONS:  has a current medication list which includes the following prescription(s): amlodipine, aspirin, farxiga, insulin aspart, insulin glargine, lisinopril, metformin, omega 3, sildenafil, simvastatin, b-d u/f, contour next test, and glucagon emergency.    ROS:     ROS: 10 point ROS neg other than the symptoms noted above in the  HPI.    GENERAL: mild fatigue, wt stable; denies fevers, chills, night sweats.   HEENT: no dysphagia, odonophagia, diplopia, neck pain  THYROID:  no apparent hyper or hypothyroid symptoms  CV: no chest pain, pressure, palpitations  LUNGS: no SOB, MCMANUS, cough, wheezing   ABDOMEN: some bloating; no diarrhea, constipation, abdominal pain  EXTREMITIES: no rashes, ulcers, edema  NEUROLOGY: no headaches, denies changes in vision, tingling, extremitiy numbness   MSK:  Right ankle/foot pains; denies muscle weakness  SKIN: no rashes or lesions  : some decreased erection function, uses sildenafil  PSYCH:  stable mood, no significant anxiety or depression  ENDOCRINE: no heat or cold intolerance    Physical Exam (visual exam)  VS:  no vital signs taken for video visit  CONSTITUTIONAL: healthy, alert and NAD, well dressed, answering questions appropriately  ENT: no nose swelling or nasal discharge, mouth redness or gum changes.  EYES: eyes grossly normal to inspection, conjunctivae and sclerae normal, no exophthalmos or proptosis  THYROID:  no apparent nodules or goiter  LUNGS: no audible wheeze, cough or visible cyanosis, no visible retractions or increased work of breathing  ABDOMEN: abdomen not evaluated  EXTREMITIES: no hand tremors, limited exam  NEUROLOGY: CN grossly intact, mentation intact and speech normal   SKIN:  no apparent skin lesions, rash, or edema with visualized skin appearance  PSYCH: mentation appears normal, affect normal/bright, judgement and insight intact,   normal speech and appearance well groomed      LABS:    All pertinent notes, labs, and images personally reviewed by me.     A/P:  Encounter Diagnoses   Name Primary?     Type 1 diabetes mellitus with diabetic nephropathy (H) Yes     Type 1 diabetes mellitus with background retinopathy (H)      Insulin pump status        Comments:  Reviewed health history and diabetes issues.  Recent CGM glucose trends good but post supper hyperglycemia  Reviewed and  interpreted tests that I previously ordered.   Ordered appropriate tests for the endocrinology disease management.    Management options discussed and implemented after shared medical decision making with the patient.  T1DM problem is chronic-stable    Plan:  Reviewed the overall T1DM management and insulin pump use.  Discussed optimal BG testing to assess glucose trends.  We reviewed insulin pump settings, basal rate and bolus dosing  Use of automated pump bolus dosing for meal/snack carb & correction dosing  Reviewed the value of reviewing the Medtronic Carelink report datal.    Recommend:  Continue the current Medtronic insulin pump and Guardian3 sensor diabetes management plan  Reasonable to continue lower dose (off-label) metformin and Farxiga medications  Pump setting changes:   Bolus ICR:  5p 4 to 3.7    Reminded him to upload pump data to Carelink website before diabetes appointments  Continue to use the Guardian3 sensor auto-mode consistently   We have discussed the previous frustrations with the Guardian3 sensors and calibration problems  Need repeat lab testing soon   Discussed nearby [facility] lab   Lab order placed  Keep focus on diet, exercise, and weight management  Agree with low sodium diet  Continue the BP and lipid medication plan  Need close monitoring of kidney function, eGFR, BP  Arrange annual dilated eye exam, fasting lipid panel testing.  Need copy of eye exam report to be faxed to our office, fax # previously given to patient    Keep follow-up nephrology appointments to assist with kidney function, antihypertensive management.  Addressed patient's questions today.    There are no Patient Instructions on file for this visit.    Future labs ordered today:   Orders Placed This Encounter   Procedures     GLUCOSE MONITOR, 72 HOUR, PHYS INTERP     Hemoglobin A1c     Basic metabolic panel     Albumin Random Urine Quantitative with Creat Ratio     TSH     Radiology/Consults  ordered today: None    Total time spent on day of encounter:  26 min    Follow-up:  3/28/23 at 1pm, Elena Olivares MD, MS  Endocrinology  [facility]    CC: KEELEY Rosenthal, KARINA Rodrigez, MASHA Whitley              Again, thank you for allowing me to participate in the care of your patient.        Sincerely,        Scar Olivares MD

## 2022-12-23 ENCOUNTER — DOCUMENTATION ONLY (OUTPATIENT)
Dept: ENDOCRINOLOGY | Facility: CLINIC | Age: 52
End: 2022-12-23

## 2022-12-23 NOTE — PROGRESS NOTES
Medtronic Signed form and progress notes     Form(s) have been completed faxed.  Faxed or mailed to: number listed on form. [phone]  Form(s) in accordion file for 1 month then to scan.    Danna Lubin, [facility]

## 2023-02-20 DIAGNOSIS — E10.9 TYPE 1 DIABETES MELLITUS WITHOUT COMPLICATION (H): ICD-10-CM

## 2023-02-20 DIAGNOSIS — I10 ESSENTIAL HYPERTENSION, BENIGN: ICD-10-CM

## 2023-02-21 RX ORDER — AMLODIPINE BESYLATE 10 MG/1
TABLET ORAL
Qty: 90 TABLET | Refills: 0 | Status: SHIPPED | OUTPATIENT
Start: 2023-02-21 | End: 2023-05-05

## 2023-02-22 ENCOUNTER — VIRTUAL VISIT (OUTPATIENT)
Dept: NEPHROLOGY | Facility: CLINIC | Age: 53
End: 2023-02-22
Payer: COMMERCIAL

## 2023-02-22 ENCOUNTER — LAB (OUTPATIENT)
Dept: LAB | Facility: CLINIC | Age: 53
End: 2023-02-22
Payer: COMMERCIAL

## 2023-02-22 DIAGNOSIS — E78.5 HYPERLIPIDEMIA LDL GOAL <100: ICD-10-CM

## 2023-02-22 DIAGNOSIS — N18.2 CKD STAGE 2 DUE TO TYPE 1 DIABETES MELLITUS (H): ICD-10-CM

## 2023-02-22 DIAGNOSIS — R80.9 PROTEINURIA, UNSPECIFIED TYPE: ICD-10-CM

## 2023-02-22 DIAGNOSIS — E78.5 HYPERLIPIDEMIA LDL GOAL <100: Primary | ICD-10-CM

## 2023-02-22 DIAGNOSIS — N18.2 CKD STAGE 2 DUE TO TYPE 1 DIABETES MELLITUS (H): Primary | ICD-10-CM

## 2023-02-22 DIAGNOSIS — E10.21 TYPE 1 DIABETES MELLITUS WITH DIABETIC NEPHROPATHY (H): ICD-10-CM

## 2023-02-22 DIAGNOSIS — Z96.41 INSULIN PUMP STATUS: ICD-10-CM

## 2023-02-22 DIAGNOSIS — E10.22 CKD STAGE 2 DUE TO TYPE 1 DIABETES MELLITUS (H): ICD-10-CM

## 2023-02-22 DIAGNOSIS — E10.22 CKD STAGE 2 DUE TO TYPE 1 DIABETES MELLITUS (H): Primary | ICD-10-CM

## 2023-02-22 LAB
ALBUMIN MFR UR ELPH: 17.9 MG/DL (ref 1–14)
ALBUMIN SERPL BCG-MCNC: 4.3 G/DL (ref 3.5–5.2)
ALT SERPL W P-5'-P-CCNC: 21 U/L (ref 10–50)
ANION GAP SERPL CALCULATED.3IONS-SCNC: 9 MMOL/L (ref 7–15)
BUN SERPL-MCNC: 18.1 MG/DL (ref 6–20)
CALCIUM SERPL-MCNC: 9.6 MG/DL (ref 8.6–10)
CHLORIDE SERPL-SCNC: 102 MMOL/L (ref 98–107)
CREAT SERPL-MCNC: 0.98 MG/DL (ref 0.67–1.17)
CREAT UR-MCNC: 51.1 MG/DL
CREAT UR-MCNC: 51.5 MG/DL
DEPRECATED HCO3 PLAS-SCNC: 29 MMOL/L (ref 22–29)
GFR SERPL CREATININE-BSD FRML MDRD: >90 ML/MIN/1.73M2
GLUCOSE SERPL-MCNC: 114 MG/DL (ref 70–99)
HBA1C MFR BLD: 6.7 %
MICROALBUMIN UR-MCNC: 92.8 MG/L
MICROALBUMIN/CREAT UR: 180.19 MG/G CR (ref 0–17)
PHOSPHATE SERPL-MCNC: 3.7 MG/DL (ref 2.5–4.5)
POTASSIUM SERPL-SCNC: 4.5 MMOL/L (ref 3.4–5.3)
PROT/CREAT 24H UR: 0.35 MG/MG CR (ref 0–0.2)
SODIUM SERPL-SCNC: 140 MMOL/L (ref 136–145)
T4 FREE SERPL-MCNC: 1.17 NG/DL (ref 0.9–1.7)
TSH SERPL DL<=0.005 MIU/L-ACNC: 4.25 UIU/ML (ref 0.3–4.2)

## 2023-02-22 PROCEDURE — 80069 RENAL FUNCTION PANEL: CPT | Performed by: PATHOLOGY

## 2023-02-22 PROCEDURE — 82043 UR ALBUMIN QUANTITATIVE: CPT | Mod: 90 | Performed by: PATHOLOGY

## 2023-02-22 PROCEDURE — 83036 HEMOGLOBIN GLYCOSYLATED A1C: CPT | Mod: 90 | Performed by: PATHOLOGY

## 2023-02-22 PROCEDURE — 84439 ASSAY OF FREE THYROXINE: CPT | Performed by: PATHOLOGY

## 2023-02-22 PROCEDURE — 84460 ALANINE AMINO (ALT) (SGPT): CPT | Performed by: PATHOLOGY

## 2023-02-22 PROCEDURE — 84156 ASSAY OF PROTEIN URINE: CPT | Performed by: PATHOLOGY

## 2023-02-22 PROCEDURE — 99214 OFFICE O/P EST MOD 30 MIN: CPT | Mod: VID

## 2023-02-22 PROCEDURE — 82570 ASSAY OF URINE CREATININE: CPT | Mod: 90 | Performed by: PATHOLOGY

## 2023-02-22 PROCEDURE — 36415 COLL VENOUS BLD VENIPUNCTURE: CPT | Performed by: PATHOLOGY

## 2023-02-22 PROCEDURE — 84443 ASSAY THYROID STIM HORMONE: CPT | Performed by: PATHOLOGY

## 2023-02-22 PROCEDURE — 99000 SPECIMEN HANDLING OFFICE-LAB: CPT | Performed by: PATHOLOGY

## 2023-02-22 RX ORDER — OMEGA-3 FATTY ACIDS/FISH OIL 300-1000MG
2 CAPSULE ORAL DAILY
COMMUNITY
Start: 2023-02-22

## 2023-02-22 NOTE — PROGRESS NOTES
Nephrology Video Visit 2/22/23    Assessment and Plan:    1. CKD2 w/proteinuria - Stable. Creat 0.9 eGFR > 90 ml/mn, UPCR 0.3 g/gCr   - Etiology of his CKD is DM/HTN?. Denies h/o retinopathy. Does have proteinuria    - Baseline creat 0.9-1.1 through 2008   - Pr/C back down to baseline today    - Previous w/u included Kappa/Lambda normal ratio, SPEP/ immunofixation/Hep/HIV all neg   - On ACE   - On SGLT2   - Blood pressures well controlled   - DM well controlled with A1c 6.7% ( 6/22)    2. Volume status/blood pressure - Denies edema/dyspnea. Home blood pressures teens - 120/. Was started on dapagliflozin 8/21 for edema with improvement. Remains on Lisinopril 20 mg bid for blood pressure/proteinuria. Weight 99.5 kg. Down from 101.6 kg last visit. Was 107.1 kg in 8/21 when Dapagliflozin was started.    - Criteria for using SGLT2 inhibitor therapy in T1DM include: BMI > 27, stable optimized insulin therapy, GFR > 60.    - Will continue current therapy    3. DM1 - On Insulin pump/CGM- well controlled with A1c of 6.7% ( 6/22). Random    - He remains on Metformin per a previous PCP to help control his Elvira Phenomenon blood sugar problems and post prandial hyperglycemia. This was prior to his insulin pump and CGM which has enabled him to have much better control. It may be helping with appetite suppression/weight loss.    - Dapagliflozin as above   - No hypoglycemia   - Per his Endo who patient last saw on 12/6/22 who has continued current regimen    4. Electrolytes - No acute concerns. K 4.5, Na 140    5. Acid base - No acute concerns. Bicarb 29    6. Disposition - RTC 6 months for follow up with labs prior    Assessment and plan was discussed with patient and he voiced his understanding and agreement.    Reason for Visit:  CKD2 follow up    HPI:  Mr Sharpe is a 53 yo male with CKD2, DM1, HTN, HLD, present today for routine CKD follow up. Last seen by me on 8/5/22  Baseline creat 0.9-1.1    ROS:   No renal  complaints  Feeling well   Is triple Covid vaxed/flu vaxed and contracted COVID 2/22  Denies CP, dyspnea, abdominal concerns  No voiding issues  No edema   Has insulin pump, CGM and performs multiple finger sticks daily to manage his DM  Following a sodium restricted diet ( < 2 g/day)  Energy and appetite are good  Walking 19-20,0000 steps/day with his job  Playing [employer] for additional exercise  Home blood pressures teens - 120/    Chronic Health Problems:  DM1 on insulin pump  HTN  HLD  Subclinical hypothyroidism  HLD  COVID 19 ( 2/22)    Family Hx:   No family history on file.     Personal Hx:   , employed as a , NS, ETOH none    Allergies:  No Known Allergies    Medications:  Current Outpatient Medications   Medication Sig     amLODIPine (NORVASC) 10 MG tablet TAKE 1 TABLET DAILY     omega 3 1000 MG CAPS Take 2 capsules by mouth daily     aspirin 81 MG tablet Take 1 tablet (81 mg) by mouth daily     B-D U/F insulin pen needle Use one Syringe 4 times daily or as directed     CONTOUR NEXT TEST test strip Use to test blood sugar 8 times daily or as directed.     FARXIGA 5 MG TABS tablet TAKE 1 TABLET DAILY     glucagon (GLUCAGON EMERGENCY) 1 MG kit Inject 1 mg into the muscle once for 1 dose     insulin aspart (NOVOLOG VIAL) 100 UNITS/ML vial INJECT UP TO  170 UNITS     DAILY VIA INSULIN PUMP     insulin glargine (BASAGLAR KWIKPEN) 100 UNIT/ML pen Inject 32 units subcutaneous every 24 hours in the event of insulin pump failure.     lisinopril (ZESTRIL) 20 MG tablet Take 1 tablet (20 mg) by mouth 2 times daily     metFORMIN (GLUCOPHAGE) 500 MG tablet Take 1 tablet (500 mg) by mouth 2 times daily (with meals)     sildenafil (VIAGRA) 100 MG tablet TAKE ONE TABLET BY MOUTH EVERY DAY AS NEEDED FOR E.D. (DO NOT TAKE WITH NITROGLYCERIN PRODUCTS)     simvastatin (ZOCOR) 40 MG tablet TAKE 1 TABLET BY MOUTH EVERYDAY AT BEDTIME     No current facility-administered medications for this visit.       Vitals:  Weight: 219#    Exam:  GEN: Pleasant male in NAD  LUNGS: Breathing is non labored  NEURO: A/O  PSYCH: Bright, articulate, calm    LABS:   CMP  Recent Labs   Lab Test 02/22/23  0919 08/05/22  0857 01/21/22  0828 10/08/21  0822 08/13/21  0900 05/28/21  0818 08/26/20  0854 06/20/19  0925 07/10/18  0859    142 139 137   < > 141 138 141 142   POTASSIUM 4.5 4.8 4.5 4.6   < > 4.3 4.1 4.5 3.9   CHLORIDE 102 106 104 101   < > 108 106 107 107   CO2 29 29 27 29   < > 33* 27 25 28   ANIONGAP 9 7 8 7   < > <1* 5 9 7   * 190* 134* 184*   < > 118* 122* 134* 97   BUN 18.1 19 24 22   < > 17 19 17 16   CR 0.98 1.00 1.14 1.19   < > 1.00 0.94 0.87 0.98   GFRESTIMATED >90 >90 78 71   < > 87 >90 >90 82   GFRESTBLACK  --   --   --   --   --  >90 >90 >90 >90   MAGGIE 9.6 9.1 8.9 9.3   < > 8.9 9.3 8.6 8.9    < > = values in this interval not displayed.     Recent Labs   Lab Test 02/22/23  0919 12/28/21  0931 08/26/20  0854 06/20/19  0925 07/10/18  0859   BILITOTAL  --  0.5 0.3 0.5 0.4   ALKPHOS  --  69 62 62 64   ALT 21 39 34 27 44   AST  --  18 14 15 22     CBC  Recent Labs   Lab Test 01/21/22  0828 10/08/21  0822 08/13/21  0900   HGB 15.4 15.3 15.3   WBC 9.2 7.3 9.1   RBC 5.35 5.19 5.19   HCT 47.2 45.4 45.0   MCV 88 88 87   MCH 28.8 29.5 29.5   MCHC 32.6 33.7 34.0   RDW 13.1 12.7 12.9    375 350     URINE STUDIES  Recent Labs   Lab Test 01/21/22  0834 10/08/21  0824 08/13/21  0905   COLOR Straw Straw Yellow   APPEARANCE Clear Clear Slightly Cloudy*   URINEGLC >499* >499* Negative   URINEBILI Negative Negative Negative   URINEKETONE Negative Negative Negative   SG 1.016 1.018 1.020   UBLD Small* Negative Negative   URINEPH 5.0 6.0 5.0   PROTEIN 30* 30* >499*   NITRITE Negative Negative Negative   LEUKEST Negative Negative Negative   RBCU <1 <1 2   WBCU <1 <1 1     Recent Labs   Lab Test 08/05/22  0902 01/21/22  0834 10/08/21  0824 08/13/21  0906   UTPG 1.45* 0.41* 0.96* 2.07*     PTH  Recent Labs   Lab Test  08/13/21  0900   PTHI 33     IRON STUDIES  No lab results found.    Cierra Whitley NP    This was a video visit  On time: 1053  End time: 1104  Originating: home  Patient location: auto  Platform: [facility]

## 2023-02-22 NOTE — LETTER
2/22/2023       RE: Aldo Sharpe  [address] [facility] [address]     Dear Colleague,    Thank you for referring your patient, Aldo Sharpe, to the [facility] [place] at [facility]. Please see a copy of my visit note below.    Nephrology Video Visit 2/22/23    Assessment and Plan:    1. CKD2 w/proteinuria - Stable. Creat 0.9 eGFR > 90 ml/mn, UPCR 0.3 g/gCr   - Etiology of his CKD is DM/HTN?. Denies h/o retinopathy. Does have proteinuria    - Baseline creat 0.9-1.1 through 2008   - Pr/C back down to baseline today    - Previous w/u included Kappa/Lambda normal ratio, SPEP/ immunofixation/Hep/HIV all neg   - On ACE   - On SGLT2   - Blood pressures well controlled   - DM well controlled with A1c 6.7% ( 6/22)    2. Volume status/blood pressure - Denies edema/dyspnea. Home blood pressures teens - 120/. Was started on dapagliflozin 8/21 for edema with improvement. Remains on Lisinopril 20 mg bid for blood pressure/proteinuria. Weight 99.5 kg. Down from 101.6 kg last visit. Was 107.1 kg in 8/21 when Dapagliflozin was started.    - Criteria for using SGLT2 inhibitor therapy in T1DM include: BMI > 27, stable optimized insulin therapy, GFR > 60.    - Will continue current therapy    3. DM1 - On Insulin pump/CGM- well controlled with A1c of 6.7% ( 6/22). Random    - He remains on Metformin per a previous PCP to help control his Elvira Phenomenon blood sugar problems and post prandial hyperglycemia. This was prior to his insulin pump and CGM which has enabled him to have much better control. It may be helping with appetite suppression/weight loss.    - Dapagliflozin as above   - No hypoglycemia   - Per his Endo who patient last saw on 12/6/22 who has continued current regimen    4. Electrolytes - No acute concerns. K 4.5, Na 140    5. Acid base - No acute concerns. Bicarb 29    6. Disposition - RTC 6 months for follow up with labs  prior    Assessment and plan was discussed with patient and he voiced his understanding and agreement.    Reason for Visit:  CKD2 follow up    HPI:  Mr Sharpe is a 53 yo male with CKD2, DM1, HTN, HLD, present today for routine CKD follow up. Last seen by me on 8/5/22  Baseline creat 0.9-1.1    ROS:   No renal complaints  Feeling well   Is triple Covid vaxed/flu vaxed and contracted COVID 2/22  Denies CP, dyspnea, abdominal concerns  No voiding issues  No edema   Has insulin pump, CGM and performs multiple finger sticks daily to manage his DM  Following a sodium restricted diet ( < 2 g/day)  Energy and appetite are good  Walking 19-20,0000 steps/day with his job  Playing [employer] for additional exercise  Home blood pressures teens - 120/    Chronic Health Problems:  DM1 on insulin pump  HTN  HLD  Subclinical hypothyroidism  HLD  COVID 19 ( 2/22)    Family Hx:   No family history on file.     Personal Hx:   , employed as a , NS, ETOH none    Allergies:  No Known Allergies    Medications:  Current Outpatient Medications   Medication Sig     amLODIPine (NORVASC) 10 MG tablet TAKE 1 TABLET DAILY     omega 3 1000 MG CAPS Take 2 capsules by mouth daily     aspirin 81 MG tablet Take 1 tablet (81 mg) by mouth daily     B-D U/F insulin pen needle Use one Syringe 4 times daily or as directed     CONTOUR NEXT TEST test strip Use to test blood sugar 8 times daily or as directed.     FARXIGA 5 MG TABS tablet TAKE 1 TABLET DAILY     glucagon (GLUCAGON EMERGENCY) 1 MG kit Inject 1 mg into the muscle once for 1 dose     insulin aspart (NOVOLOG VIAL) 100 UNITS/ML vial INJECT UP TO  170 UNITS     DAILY VIA INSULIN PUMP     insulin glargine (BASAGLAR KWIKPEN) 100 UNIT/ML pen Inject 32 units subcutaneous every 24 hours in the event of insulin pump failure.     lisinopril (ZESTRIL) 20 MG tablet Take 1 tablet (20 mg) by mouth 2 times daily     metFORMIN (GLUCOPHAGE) 500 MG tablet Take 1 tablet (500 mg) by mouth 2  times daily (with meals)     sildenafil (VIAGRA) 100 MG tablet TAKE ONE TABLET BY MOUTH EVERY DAY AS NEEDED FOR E.D. (DO NOT TAKE WITH NITROGLYCERIN PRODUCTS)     simvastatin (ZOCOR) 40 MG tablet TAKE 1 TABLET BY MOUTH EVERYDAY AT BEDTIME     No current facility-administered medications for this visit.      Vitals:  Weight: 219#    Exam:  GEN: Pleasant male in NAD  LUNGS: Breathing is non labored  NEURO: A/O  PSYCH: Bright, articulate, calm    LABS:   CMP  Recent Labs   Lab Test 02/22/23  0919 08/05/22  0857 01/21/22  0828 10/08/21  0822 08/13/21  0900 05/28/21  0818 08/26/20  0854 06/20/19  0925 07/10/18  0859    142 139 137   < > 141 138 141 142   POTASSIUM 4.5 4.8 4.5 4.6   < > 4.3 4.1 4.5 3.9   CHLORIDE 102 106 104 101   < > 108 106 107 107   CO2 29 29 27 29   < > 33* 27 25 28   ANIONGAP 9 7 8 7   < > <1* 5 9 7   * 190* 134* 184*   < > 118* 122* 134* 97   BUN 18.1 19 24 22   < > 17 19 17 16   CR 0.98 1.00 1.14 1.19   < > 1.00 0.94 0.87 0.98   GFRESTIMATED >90 >90 78 71   < > 87 >90 >90 82   GFRESTBLACK  --   --   --   --   --  >90 >90 >90 >90   MAGGIE 9.6 9.1 8.9 9.3   < > 8.9 9.3 8.6 8.9    < > = values in this interval not displayed.     Recent Labs   Lab Test 02/22/23  0919 12/28/21  0931 08/26/20  0854 06/20/19  0925 07/10/18  0859   BILITOTAL  --  0.5 0.3 0.5 0.4   ALKPHOS  --  69 62 62 64   ALT 21 39 34 27 44   AST  --  18 14 15 22     CBC  Recent Labs   Lab Test 01/21/22  0828 10/08/21  0822 08/13/21  0900   HGB 15.4 15.3 15.3   WBC 9.2 7.3 9.1   RBC 5.35 5.19 5.19   HCT 47.2 45.4 45.0   MCV 88 88 87   MCH 28.8 29.5 29.5   MCHC 32.6 33.7 34.0   RDW 13.1 12.7 12.9    375 350     URINE STUDIES  Recent Labs   Lab Test 01/21/22  0834 10/08/21  0824 08/13/21  0905   COLOR Straw Straw Yellow   APPEARANCE Clear Clear Slightly Cloudy*   URINEGLC >499* >499* Negative   URINEBILI Negative Negative Negative   URINEKETONE Negative Negative Negative   SG 1.016 1.018 1.020   UBLD Small* Negative  Negative   URINEPH 5.0 6.0 5.0   PROTEIN 30* 30* >499*   NITRITE Negative Negative Negative   LEUKEST Negative Negative Negative   RBCU <1 <1 2   WBCU <1 <1 1     Recent Labs   Lab Test 08/05/22  0902 01/21/22  0834 10/08/21  0824 08/13/21  0906   UTPG 1.45* 0.41* 0.96* 2.07*     PTH  Recent Labs   Lab Test 08/13/21  0900   PTHI 33     IRON STUDIES  No lab results found.    Cierra Whitley NP    This was a video visit  On time: 1053  End time: 1104  Originating: home  Patient location: auto  Platform: [facility]    Again, thank you for allowing me to participate in the care of your patient.      Sincerely,    Cierra Whitley NP

## 2023-02-22 NOTE — NURSING NOTE
Is the patient currently in the state of [place]? YES    Visit mode:VIDEO    If the visit is dropped, the patient can be reconnected by: VIDEO VISIT: Text to cell phone: [phone]    Will anyone else be joining the visit? NO      How would you like to obtain your AVS? MyChart    Are changes needed to the allergy or medication list? NO    Reason for visit: No.  Hopes that lab results from this morning were received.

## 2023-03-08 ENCOUNTER — TELEPHONE (OUTPATIENT)
Dept: NEPHROLOGY | Facility: CLINIC | Age: 53
End: 2023-03-08
Payer: COMMERCIAL

## 2023-03-08 NOTE — TELEPHONE ENCOUNTER
Sent Appointeddhart (1st Attempt) for the patient to call back and schedule the following:    Appointment type: Follow up appt  Provider: Annie Whitley  Return date: 8/2023  Specialty phone number: [phone]  Additional appointment(s) needed: lab  Additonal Notes: n/a

## 2023-03-28 ENCOUNTER — VIRTUAL VISIT (OUTPATIENT)
Dept: ENDOCRINOLOGY | Facility: CLINIC | Age: 53
End: 2023-03-28
Payer: COMMERCIAL

## 2023-03-28 DIAGNOSIS — Z53.9 ERRONEOUS ENCOUNTER--DISREGARD: ICD-10-CM

## 2023-03-28 DIAGNOSIS — E10.21 TYPE 1 DIABETES MELLITUS WITH DIABETIC NEPHROPATHY (H): Primary | ICD-10-CM

## 2023-03-28 DIAGNOSIS — E03.8 SUBCLINICAL HYPOTHYROIDISM: ICD-10-CM

## 2023-03-28 DIAGNOSIS — Z96.41 INSULIN PUMP STATUS: ICD-10-CM

## 2023-03-28 DIAGNOSIS — E10.3299 TYPE 1 DIABETES MELLITUS WITH BACKGROUND RETINOPATHY (H): ICD-10-CM

## 2023-03-28 NOTE — PROGRESS NOTES
Patient had endocrinology video visit today.  He was 10-15 min late for the start of the video visit and then poor audio quality.    Unfortunately, the appointment was aborted and needs to be rescheduled.    NIRALI Olivares MD, MS  Endocrinology  [facility]

## 2023-04-01 ENCOUNTER — HEALTH MAINTENANCE LETTER (OUTPATIENT)
Age: 53
End: 2023-04-01

## 2023-04-14 DIAGNOSIS — Z96.41 INSULIN PUMP STATUS: ICD-10-CM

## 2023-05-04 DIAGNOSIS — I10 ESSENTIAL HYPERTENSION, BENIGN: ICD-10-CM

## 2023-05-04 DIAGNOSIS — E10.9 TYPE 1 DIABETES MELLITUS WITHOUT COMPLICATION (H): ICD-10-CM

## 2023-05-05 RX ORDER — AMLODIPINE BESYLATE 10 MG/1
TABLET ORAL
Qty: 90 TABLET | Refills: 0 | Status: SHIPPED | OUTPATIENT
Start: 2023-05-05 | End: 2023-07-17

## 2023-05-09 ENCOUNTER — ANCILLARY PROCEDURE (OUTPATIENT)
Dept: GENERAL RADIOLOGY | Facility: CLINIC | Age: 53
End: 2023-05-09
Attending: FAMILY MEDICINE
Payer: COMMERCIAL

## 2023-05-09 ENCOUNTER — OFFICE VISIT (OUTPATIENT)
Dept: FAMILY MEDICINE | Facility: CLINIC | Age: 53
End: 2023-05-09
Payer: OTHER MISCELLANEOUS

## 2023-05-09 VITALS
HEART RATE: 67 BPM | WEIGHT: 222 LBS | SYSTOLIC BLOOD PRESSURE: 124 MMHG | DIASTOLIC BLOOD PRESSURE: 80 MMHG | OXYGEN SATURATION: 96 % | BODY MASS INDEX: 31.08 KG/M2 | RESPIRATION RATE: 16 BRPM | TEMPERATURE: 97.5 F | HEIGHT: 71 IN

## 2023-05-09 DIAGNOSIS — S82.401A TIBIA/FIBULA FRACTURE, RIGHT, CLOSED, INITIAL ENCOUNTER: Primary | ICD-10-CM

## 2023-05-09 DIAGNOSIS — S82.201A TIBIA/FIBULA FRACTURE, RIGHT, CLOSED, INITIAL ENCOUNTER: Primary | ICD-10-CM

## 2023-05-09 DIAGNOSIS — S99.911A INJURY OF RIGHT ANKLE, INITIAL ENCOUNTER: ICD-10-CM

## 2023-05-09 PROBLEM — N18.2 CHRONIC KIDNEY DISEASE, STAGE 2 (MILD): Status: ACTIVE | Noted: 2023-05-09

## 2023-05-09 PROBLEM — E10.319 DIABETIC RETINOPATHY ASSOCIATED WITH TYPE 1 DIABETES MELLITUS (H): Status: ACTIVE | Noted: 2023-05-09

## 2023-05-09 PROCEDURE — 90714 TD VACC NO PRESV 7 YRS+ IM: CPT | Performed by: FAMILY MEDICINE

## 2023-05-09 PROCEDURE — 29515 APPLICATION SHORT LEG SPLINT: CPT | Performed by: FAMILY MEDICINE

## 2023-05-09 PROCEDURE — 90471 IMMUNIZATION ADMIN: CPT | Performed by: FAMILY MEDICINE

## 2023-05-09 PROCEDURE — 73610 X-RAY EXAM OF ANKLE: CPT | Mod: TC | Performed by: RADIOLOGY

## 2023-05-09 ASSESSMENT — PAIN SCALES - GENERAL: PAINLEVEL: MODERATE PAIN (4)

## 2023-05-09 NOTE — PROGRESS NOTES
"Office Visit  [facility]  Date of Service: May 9, 2023  Assessment & Plan     1. Injury right ankle. X-ray shows probable fracture. Placed in walking boot and referred to ortho for further evaluation and treatment.     Order Summary                                                      Tibia/fibula fracture, right, closed, initial encounter  -     Orthopedic  Referral; Future    Injury of right ankle, initial encounter  -     XR Ankle Right G/E 3 Views; Future    Other orders  -     HEPATITIS B VACCINE ADULT 3 DOSE IM (ENGERIX-B/RECOMBIVAX HB); Future  -     TDAP VACCINE (Adacel, Boostrix); Future  -     ZOSTER VACCINE RECOMBINANT ADJUVANTED (SHINGRIX); Future  -     REVIEW OF HEALTH MAINTENANCE PROTOCOL ORDERS  -     PRIMARY CARE FOLLOW-UP SCHEDULING; Future  -     TD PRESERV FREE, IM (7+ YRS) (DECAVAC/TENIVAC)        Immunizations Administered     Name Date Dose VIS Date Route    TD,PF 7+ (Tenivac) 5/9/23 10:19 AM 0.5 mL 08/06/2021, Given Today Intramuscular          Future Appointments   Date Time Provider Department Center   5/25/2023 11:30 AM Scar Olivares MD [facility]       Completed by: Romi Hagan M.D., [facility]. 5/9/2023 9:30 AM. MDM: [facility] neighborhood: [place] [id number], language: English, .  Subjective   Aldo Sharpe is a 53 year old male who presents for   Chief Complaint   Patient presents with     Fall     05/02/23 , fell at work  R foot injury      Fell in parking lot 5/2/23  Not sure how foot landed  Was able to bear weight at first  Now can't walk on any uneven surfaces.    Objective   /80 (BP Location: Left arm, Patient Position: Sitting, Cuff Size: Adult Regular)   Pulse 67   Temp 97.5  F (36.4  C) (Temporal)   Resp 16   Ht 1.8 m (5' 10.87\")   Wt 100.7 kg (222 lb)   SpO2 96%   BMI 31.08 kg/m   He reports that he has never smoked. He has never been exposed to tobacco smoke. He has never used " smokeless tobacco.    Gen: Alert, no apparent distress.    Results for orders placed or performed in visit on 05/09/23   XR Ankle Right G/E 3 Views     Status: None    Narrative    EXAM: XR ANKLE RIGHT G/E 3 VIEWS  LOCATION: [facility]  DATE/TIME: 5/9/2023 9:45 AM CDT    INDICATION: Injury of right ankle, initial encounter. Pain.  COMPARISON: None.      Impression    IMPRESSION: Small ossifications about the medial and lateral malleoli from old trauma. Mild valgus angulation at the tibiotalar joint. Moderate osteoarthrosis of the tibiotalar joint. Arterial calcifications. There is no evidence of an acute or subacute   fracture.

## 2023-05-09 NOTE — PROGRESS NOTES
Prior to immunization administration, verified patients identity using patient s name and date of birth. Please see Immunization Activity for additional information.     Screening Questionnaire for Adult Immunization    Are you sick today?   No   Do you have allergies to medications, food, a vaccine component or latex?   No   Have you ever had a serious reaction after receiving a vaccination?   No   Do you have a long-term health problem with heart, lung, kidney, or metabolic disease (e.g., diabetes), asthma, a blood disorder, no spleen, complement component deficiency, a cochlear implant, or a spinal fluid leak?  Are you on long-term aspirin therapy?   Yes   Do you have cancer, leukemia, HIV/AIDS, or any other immune system problem?   No   Do you have a parent, brother, or sister with an immune system problem?   No   In the past 3 months, have you taken medications that affect  your immune system, such as prednisone, other steroids, or anticancer drugs; drugs for the treatment of rheumatoid arthritis, Crohn s disease, or psoriasis; or have you had radiation treatments?   No   Have you had a seizure, or a brain or other nervous system problem?   No   During the past year, have you received a transfusion of blood or blood    products, or been given immune (gamma) globulin or antiviral drug?   No   For women: Are you pregnant or is there a chance you could become       pregnant during the next month?   No   Have you received any vaccinations in the past 4 weeks?   No     Immunization questionnaire was positive for at least one answer.  Notified .    .     Screening performed by Jessica Garcia MA on 5/9/2023 at 9:21 AM.      Answers for HPI/ROS submitted by the patient on 5/9/2023  How many servings of fruits and vegetables do you eat daily?: 2-3  On average, how many sweetened beverages do you drink each day (Examples: soda, juice, sweet tea, etc.  Do NOT count diet or artificially sweetened beverages)?: 0  How many  minutes a day do you exercise enough to make your heart beat faster?: 60 or more  How many days a week do you exercise enough to make your heart beat faster?: 5  How many days per week do you miss taking your medication?: 0  What is the reason for your visit today?: hurt foot ankle  When did your symptoms begin?: 3-7 days ago

## 2023-06-30 DIAGNOSIS — E78.5 HYPERLIPIDEMIA LDL GOAL <100: ICD-10-CM

## 2023-06-30 RX ORDER — SIMVASTATIN 40 MG
TABLET ORAL
Qty: 90 TABLET | Refills: 1 | Status: SHIPPED | OUTPATIENT
Start: 2023-06-30 | End: 2023-12-27

## 2023-06-30 NOTE — TELEPHONE ENCOUNTER
"Last Written Prescription Date:  5/9/22  90Last Fill Quantity: 90,  # refills: 3   Last office visit:  3/28/2023 with prescribing provider:  Dr. Olivares   Future Office Visit:  12/5/23        Requested Prescriptions   Pending Prescriptions Disp Refills     simvastatin (ZOCOR) 40 MG tablet [Pharmacy Med Name: SIMVASTATIN 40 MG TABLET] 90 tablet 3     Sig: TAKE 1 TABLET BY MOUTH EVERYDAY AT BEDTIME       Statins Protocol Failed - 6/30/2023 12:37 PM        Failed - LDL on file in past 12 months     Recent Labs   Lab Test 06/10/22  0929   LDL 81             Passed - No abnormal creatine kinase in past 12 months     No lab results found.             Passed - Recent (12 mo) or future (30 days) visit within the authorizing provider's specialty     Patient has had an office visit with the authorizing provider or a provider within the authorizing providers department within the previous 12 mos or has a future within next 30 days. See \"Patient Info\" tab in inbasket, or \"Choose Columns\" in Meds & Orders section of the refill encounter.              Passed - Medication is active on med list        Passed - Patient is age 18 or older           Refills sent  Pina Sky RN    "

## 2023-07-16 DIAGNOSIS — E10.9 TYPE 1 DIABETES MELLITUS WITHOUT COMPLICATION (H): ICD-10-CM

## 2023-07-16 DIAGNOSIS — R80.9 PROTEINURIA, UNSPECIFIED TYPE: ICD-10-CM

## 2023-07-16 DIAGNOSIS — I10 ESSENTIAL HYPERTENSION, BENIGN: ICD-10-CM

## 2023-07-17 RX ORDER — AMLODIPINE BESYLATE 10 MG/1
TABLET ORAL
Qty: 90 TABLET | Refills: 0 | Status: SHIPPED | OUTPATIENT
Start: 2023-07-17 | End: 2023-09-28

## 2023-07-18 DIAGNOSIS — N52.9 ERECTILE DYSFUNCTION, UNSPECIFIED ERECTILE DYSFUNCTION TYPE: ICD-10-CM

## 2023-07-18 RX ORDER — SILDENAFIL 100 MG/1
TABLET, FILM COATED ORAL
Qty: 18 TABLET | Refills: 0 | Status: SHIPPED | OUTPATIENT
Start: 2023-07-18 | End: 2023-10-24

## 2023-07-20 RX ORDER — DAPAGLIFLOZIN 5 MG/1
TABLET, FILM COATED ORAL
Qty: 90 TABLET | Refills: 3 | Status: SHIPPED | OUTPATIENT
Start: 2023-07-20 | End: 2024-05-14

## 2023-08-14 ENCOUNTER — OFFICE VISIT (OUTPATIENT)
Dept: ENDOCRINOLOGY | Facility: CLINIC | Age: 53
End: 2023-08-14
Payer: COMMERCIAL

## 2023-08-14 ENCOUNTER — LAB (OUTPATIENT)
Dept: LAB | Facility: CLINIC | Age: 53
End: 2023-08-14
Payer: COMMERCIAL

## 2023-08-14 VITALS
HEART RATE: 60 BPM | DIASTOLIC BLOOD PRESSURE: 79 MMHG | WEIGHT: 219 LBS | SYSTOLIC BLOOD PRESSURE: 122 MMHG | BODY MASS INDEX: 30.66 KG/M2 | HEIGHT: 71 IN

## 2023-08-14 DIAGNOSIS — Z96.41 INSULIN PUMP STATUS: ICD-10-CM

## 2023-08-14 DIAGNOSIS — E10.9 TYPE 1 DIABETES MELLITUS WITHOUT COMPLICATION (H): ICD-10-CM

## 2023-08-14 DIAGNOSIS — E10.22 CKD STAGE 2 DUE TO TYPE 1 DIABETES MELLITUS (H): ICD-10-CM

## 2023-08-14 DIAGNOSIS — E03.8 SUBCLINICAL HYPOTHYROIDISM: ICD-10-CM

## 2023-08-14 DIAGNOSIS — I10 ESSENTIAL HYPERTENSION, BENIGN: ICD-10-CM

## 2023-08-14 DIAGNOSIS — E10.21 TYPE 1 DIABETES MELLITUS WITH DIABETIC NEPHROPATHY (H): Primary | ICD-10-CM

## 2023-08-14 DIAGNOSIS — E10.3299 TYPE 1 DIABETES MELLITUS WITH BACKGROUND RETINOPATHY (H): ICD-10-CM

## 2023-08-14 DIAGNOSIS — E10.21 TYPE 1 DIABETES MELLITUS WITH DIABETIC NEPHROPATHY (H): ICD-10-CM

## 2023-08-14 DIAGNOSIS — N18.2 CKD STAGE 2 DUE TO TYPE 1 DIABETES MELLITUS (H): ICD-10-CM

## 2023-08-14 LAB
ALBUMIN MFR UR ELPH: 11.9 MG/DL
ALBUMIN SERPL BCG-MCNC: 4.3 G/DL (ref 3.5–5.2)
ANION GAP SERPL CALCULATED.3IONS-SCNC: 12 MMOL/L (ref 7–15)
BUN SERPL-MCNC: 19 MG/DL (ref 6–20)
CALCIUM SERPL-MCNC: 9.5 MG/DL (ref 8.6–10)
CHLORIDE SERPL-SCNC: 102 MMOL/L (ref 98–107)
CHOLEST SERPL-MCNC: 164 MG/DL
CREAT SERPL-MCNC: 1.01 MG/DL (ref 0.67–1.17)
CREAT UR-MCNC: 65.1 MG/DL
DEPRECATED HCO3 PLAS-SCNC: 25 MMOL/L (ref 22–29)
GFR SERPL CREATININE-BSD FRML MDRD: 89 ML/MIN/1.73M2
GLUCOSE SERPL-MCNC: 108 MG/DL (ref 70–99)
HBA1C MFR BLD: 6.7 % (ref 0–5.6)
HDLC SERPL-MCNC: 47 MG/DL
LDLC SERPL CALC-MCNC: 95 MG/DL
NONHDLC SERPL-MCNC: 117 MG/DL
PHOSPHATE SERPL-MCNC: 3.2 MG/DL (ref 2.5–4.5)
POTASSIUM SERPL-SCNC: 4.5 MMOL/L (ref 3.4–5.3)
PROT/CREAT 24H UR: 0.18 MG/MG CR (ref 0–0.2)
SODIUM SERPL-SCNC: 139 MMOL/L (ref 136–145)
TRIGL SERPL-MCNC: 111 MG/DL
TSH SERPL DL<=0.005 MIU/L-ACNC: 3.24 UIU/ML (ref 0.3–4.2)

## 2023-08-14 PROCEDURE — 36415 COLL VENOUS BLD VENIPUNCTURE: CPT

## 2023-08-14 PROCEDURE — 84443 ASSAY THYROID STIM HORMONE: CPT

## 2023-08-14 PROCEDURE — 99214 OFFICE O/P EST MOD 30 MIN: CPT | Performed by: INTERNAL MEDICINE

## 2023-08-14 PROCEDURE — 80061 LIPID PANEL: CPT

## 2023-08-14 PROCEDURE — 83036 HEMOGLOBIN GLYCOSYLATED A1C: CPT

## 2023-08-14 PROCEDURE — 95251 CONT GLUC MNTR ANALYSIS I&R: CPT | Performed by: INTERNAL MEDICINE

## 2023-08-14 PROCEDURE — 80069 RENAL FUNCTION PANEL: CPT

## 2023-08-14 PROCEDURE — 84156 ASSAY OF PROTEIN URINE: CPT

## 2023-08-14 RX ORDER — LISINOPRIL 20 MG/1
20 TABLET ORAL 2 TIMES DAILY
Qty: 180 TABLET | Refills: 0 | Status: SHIPPED | OUTPATIENT
Start: 2023-08-14 | End: 2023-11-06

## 2023-08-14 NOTE — PROGRESS NOTES
Recent issues:  Diabetes follow-up evaluation  Patient continues with the Medtronic 670G pump and Guardian3 sensor, , Farxiga meds  He recalls current 670G pump now OOW  Reviewed medical history from patient and Epic chart record         1985. Diagnosis of diabetes mellitus age 15, living in [facility]  Symptoms of increased thirst, feeling unwell  Medical evaluation and lab testing showed high glucose level  Began treatment with diabetes pills, then transitioned to insulin 1-1.5 yrs later  Had taken Lantus, Humalog insulins  Medical evaluations with Dr. Kaleb Rosenthal/[facility]  Recalls problems with armando phenomenon  Hospitalization for DKA: none  1/2019. Switched to Medtronic insulin pump  Using the Medtronic 670G pump and also metformin, uses Guardian3 sensor regularly  Previous  hgbA1c trends include:     Lab Test 08/26/20  0854 01/07/20  1721 10/09/19  1231 06/20/19  0925 03/20/19  1333   A1C 6.7* 6.6* 6.9* 6.8* 7.0*         5/5/21. Initial diabetes evaluation with me at [place]  Reviewed health history and diabetes management  Initial labs included high UCR, then 24-hr urine showing proteinuria up to 2 g/g Cr    8/2021. Nephrology evaluations with Dr. Bethany Rodrigez/[facility]  Started low sodium diet, also Farxiga 5 mg daily, dose reduction metformin as 500 mg BID  Purchased home BP monitor, also started participation with [employer] study that includes use of Apple Watch  Patient feeling better overall, better energy and some wt loss    Using the Medtronic 670G pump with Guardian3 sensor  Current pump brand:   Novolog in pump   Metformin 500 mg morning and evening   Farxiga 5 mg in evening    Blood glucose (BG) meter:  Contour Next, also the Livongo meter   Tests up to 8x/day    Recent Medtronic 670G pump settings:      Recent Medtronic Carelink data:                Recent [facility] labs include:  Lab Results   Component Value Date    A1C 6.7 (H) 02/22/2023     02/22/2023    POTASSIUM 4.5  02/22/2023    CHLORIDE 102 02/22/2023    CO2 29 02/22/2023    ANIONGAP 9 02/22/2023     (H) 02/22/2023    BUN 18.1 02/22/2023    CR 0.98 02/22/2023    GFRESTIMATED >90 02/22/2023    GFRESTBLACK >90 05/28/2021    MAGGIE 9.6 02/22/2023    CPEPT <0.1 (L) 05/28/2021    CHOL 151 06/10/2022    TRIG 110 06/10/2022    HDL 48 06/10/2022    LDL 81 06/10/2022    NHDL 103 06/10/2022    UCRR 51.1 02/22/2023    UCRR 51.5 02/22/2023    MICROL 92.8 02/22/2023    UMALCR 180.19 (H) 02/22/2023    TSH 4.25 (H) 02/22/2023    T4 1.17 02/22/2023     Lab Results   Component Value Date    TSH 4.25 (H) 02/22/2023    T4 1.17 02/22/2023    TPO 53 (H) 10/08/2021     Last eye exam 12/2022 at [facility], NPDR OU  DM Complications:     Nephropathy:    Proteinuria    Takes lisinopril, amlodipine, Farxiga meds    Sees Cierra Whitley CNP/[facility] Nephrology   Retinopathy:    BDR in 12/2022      Lives in [place], [place], works as [employer]   Sees Dr. Kaleb Rosenthal/[facility] for general medicine evaluations.  Also sees Dr. Bethany Rodrigez/[facility] Nephrology    PMH/PSH:  Past Medical History:   Diagnosis Date    Diabetic eye exam (H) 03/18/2014    Hyperlipidemia LDL goal <100 10/31/2010    Proteinuria     Right foot pain     Type 1 diabetes mellitus with complications (H)     nephropathy     Past Surgical History:   Procedure Laterality Date    APPENDECTOMY      COLONOSCOPY N/A 6/14/2022    Procedure: COLONOSCOPY, FLEXIBLE, WITH LESION REMOVAL USING SNARE;  Surgeon: Danielito Garza MD;  Location:  GI    ORTHOPEDIC SURGERY         Family Hx:  No family history on file.      Social Hx:  Social History     Socioeconomic History    Marital status:      Spouse name: Not on file    Number of children: Not on file    Years of education: Not on file    Highest education level: Not on file   Occupational History    Occupation:    Tobacco Use    Smoking status: Never     Passive exposure: Never    Smokeless  "tobacco: Never   Substance and Sexual Activity    Alcohol use: No     Comment: none    Drug use: No    Sexual activity: Yes     Partners: Female   Other Topics Concern    Parent/sibling w/ CABG, MI or angioplasty before 65F 55M? Not Asked   Social History Narrative    Not on file     Social Determinants of Health     Financial Resource Strain: Not on file   Food Insecurity: Not on file   Transportation Needs: Not on file   Physical Activity: Not on file   Stress: Not on file   Social Connections: Not on file   Intimate Partner Violence: Not on file   Housing Stability: Not on file          MEDICATIONS:  has a current medication list which includes the following prescription(s): amlodipine, aspirin, farxiga, insulin aspart, insulin glargine, lisinopril, metformin, omega 3, sildenafil, simvastatin, b-d u/f, contour next test, and glucagon emergency.    ROS:     ROS: 10 point ROS neg other than the symptoms noted above in the HPI.    GENERAL: mild fatigue, wt stable; denies fevers, chills, night sweats.   HEENT: no dysphagia, odonophagia, diplopia, neck pain  THYROID:  no apparent hyper or hypothyroid symptoms  CV: no chest pain, pressure, palpitations  LUNGS: no SOB, MCMANUS, cough, wheezing   ABDOMEN: some bloating; no diarrhea, constipation, abdominal pain  EXTREMITIES: no rashes, ulcers, edema  NEUROLOGY: no headaches, denies changes in vision, tingling, extremitiy numbness   MSK:  previous right ankle/foot pains; denies muscle weakness  SKIN: no rashes or lesions  : some decreased erection function, uses sildenafil  PSYCH:  stable mood, no significant anxiety or depression  ENDOCRINE: no heat or cold intolerance      Physical Exam   VS: /79   Pulse 60   Ht 1.8 m (5' 10.87\")   Wt 99.3 kg (219 lb)   BMI 30.66 kg/m    GENERAL: AXOX3, NAD, well dressed, answering questions appropriately, appears stated age.  ENT: no nose swelling or nasal discharge, mouth redness or gum changes.  EYES: eyes grossly normal to " inspection, conjunctivae and sclerae normal, no exophthalmos or proptosis  THYROID:  no apparent nodules or goiter  LUNGS: no audible wheeze, cough or visible cyanosis, or increased work of breathing  ABDOMEN: abdomen mildly obese size  EXTREMITIES: normal appearance of feet, pulses R/L DP 4/4, no skin lesions or edema noted  NEUROLOGY: CN grossly intact, no tremors  MSK: grossly intact  SKIN:  no apparent skin lesions, rash, or edema with visualized skin appearance  PSYCH: mentation appears normal, affect normal/bright, judgement and insight intact,   normal speech and appearance well groomed        LABS:    All pertinent notes, labs, and images personally reviewed by me.     A/P:  Encounter Diagnoses   Name Primary?    Type 1 diabetes mellitus with diabetic nephropathy (H) Yes    Type 1 diabetes mellitus with background retinopathy (H)     Insulin pump status     Subclinical hypothyroidism        Comments:  Reviewed health history and diabetes issues.  Recent CGM glucose trends good  Reviewed and interpreted tests that I previously ordered.   Ordered appropriate tests for the endocrinology disease management.    Management options discussed and implemented after shared medical decision making with the patient.  T1DM problem is chronic-stable    Plan:  Reviewed the overall T1DM management and insulin pump use.  Discussed optimal BG testing to assess glucose trends.  We reviewed insulin pump settings, basal rate and bolus dosing  Use of automated pump bolus dosing for meal/snack carb & correction dosing  Reviewed the recent Medtronic Carelink pump report and Guardian3 CGM glucose trend data, in detail    Recommend:  Continue the current Medtronic insulin pump and Guardian3 sensor diabetes management plan  Reasonable to continue lower dose (off-label) metformin and Farxiga medications  No pump setting changes at this time.    Discussed pump upgrade options, also the Medtronic 780G/Guardian4 CGM   He will contact  Med[employer] soon to initiate pump upgrade  Keep focus on diet, exercise, and weight management  Agree with low sodium diet  Continue the BP and lipid medication plan  Need close monitoring of kidney function, eGFR, BP  Need repeat fasting lab testing soon   Discussed nearby [facility] or [facility] lab   Lab order placed  Arrange annual dilated eye exam, fasting lipid panel testing.  Need copy of eye exam report to be faxed to our office, fax # previously given to patient    Keep follow-up nephrology appointments to assist with kidney function, antihypertensive management.  Addressed patient's questions today.    There are no Patient Instructions on file for this visit.    Future labs ordered today:   Orders Placed This Encounter   Procedures    GLUCOSE MONITOR, 72 HOUR, PHYS INTERP    Hemoglobin A1c    Basic metabolic panel    TSH with free T4 reflex    Lipid panel reflex to direct LDL Fasting     Radiology/Consults ordered today: None    Total time spent on day of encounter:  30 min    Follow-up:  12/5/23 Return    NIRALI Olivares MD, MS  Endocrinology  [facility]    CC: KEELEY Rosenthal, KARINA Rodrigez, MASHA Whitley

## 2023-08-14 NOTE — LETTER
8/14/2023         RE: Aldo Sharpe  [address] [place] [id number]        Dear Colleague,    Thank you for referring your patient, Aldo Sharpe, to the [facility] [place]. Please see a copy of my visit note below.      Recent issues:  Diabetes follow-up evaluation  Patient continues with the Medtronic 670G pump and Guardian3 sensor, MF, Farxiga meds  He recalls current 670G pump now OOW  Reviewed medical history from patient and [facility] chart record         1985. Diagnosis of diabetes mellitus age 15, living in [facility]  Symptoms of increased thirst, feeling unwell  Medical evaluation and lab testing showed high glucose level  Began treatment with diabetes pills, then transitioned to insulin 1-1.5 yrs later  Had taken Lantus, Humalog insulins  Medical evaluations with Dr. Kaleb Rosenthal/[facility]  Recalls problems with armando phenomenon  Hospitalization for DKA: none  1/2019. Switched to Medtronic insulin pump  Using the Medtronic 670G pump and also metformin, uses Guardian3 sensor regularly  Previous  hgbA1c trends include:     Lab Test 08/26/20  0854 01/07/20  1721 10/09/19  1231 06/20/19  0925 03/20/19  1333   A1C 6.7* 6.6* 6.9* 6.8* 7.0*         5/5/21. Initial diabetes evaluation with me at [place]  Reviewed health history and diabetes management  Initial labs included high UCR, then 24-hr urine showing proteinuria up to 2 g/g Cr    8/2021. Nephrology evaluations with Dr. Bethany Rodrigez/[facility]  Started low sodium diet, also Farxiga 5 mg daily, dose reduction metformin as 500 mg BID  Purchased home BP monitor, also started participation with [employer] study that includes use of Apple Watch  Patient feeling better overall, better energy and some wt loss    Using the Medtronic 670G pump with Guardian3 sensor  Current pump brand:   Novolog in pump   Metformin 500 mg morning and evening   Farxiga 5 mg in evening    Blood glucose (BG) meter:  Contour Next, also  the Livongo meter   Tests up to 8x/day    Recent Medtronic 670G pump settings:      Recent Medtronic Carelink data:                Recent [facility] labs include:  Lab Results   Component Value Date    A1C 6.7 (H) 02/22/2023     02/22/2023    POTASSIUM 4.5 02/22/2023    CHLORIDE 102 02/22/2023    CO2 29 02/22/2023    ANIONGAP 9 02/22/2023     (H) 02/22/2023    BUN 18.1 02/22/2023    CR 0.98 02/22/2023    GFRESTIMATED >90 02/22/2023    GFRESTBLACK >90 05/28/2021    MAGGIE 9.6 02/22/2023    CPEPT <0.1 (L) 05/28/2021    CHOL 151 06/10/2022    TRIG 110 06/10/2022    HDL 48 06/10/2022    LDL 81 06/10/2022    NHDL 103 06/10/2022    UCRR 51.1 02/22/2023    UCRR 51.5 02/22/2023    MICROL 92.8 02/22/2023    UMALCR 180.19 (H) 02/22/2023    TSH 4.25 (H) 02/22/2023    T4 1.17 02/22/2023     Lab Results   Component Value Date    TSH 4.25 (H) 02/22/2023    T4 1.17 02/22/2023    TPO 53 (H) 10/08/2021     Last eye exam 12/2022 at [facility], NPDR OU  DM Complications:     Nephropathy:    Proteinuria    Takes lisinopril, amlodipine, Farxiga meds    Sees [facility] Nephrology   Retinopathy:    BDR in 12/2022      Lives in [place], [place], works as [employer]   Sees Dr. Kaleb Rosenthal/[facility] for general medicine evaluations.  Also sees Dr. Bethany Rodrigez/[facility] Nephrology    PMH/PSH:  Past Medical History:   Diagnosis Date     Diabetic eye exam (H) 03/18/2014     Hyperlipidemia LDL goal <100 10/31/2010     Proteinuria      Right foot pain      Type 1 diabetes mellitus with complications (H)     nephropathy     Past Surgical History:   Procedure Laterality Date     APPENDECTOMY       COLONOSCOPY N/A 6/14/2022    Procedure: COLONOSCOPY, FLEXIBLE, WITH LESION REMOVAL USING SNARE;  Surgeon: Danielito Garza MD;  Location:  GI     ORTHOPEDIC SURGERY         Family Hx:  No family history on file.      Social Hx:  Social History     Socioeconomic History     Marital status:      Spouse  name: Not on file     Number of children: Not on file     Years of education: Not on file     Highest education level: Not on file   Occupational History     Occupation:    Tobacco Use     Smoking status: Never     Passive exposure: Never     Smokeless tobacco: Never   Substance and Sexual Activity     Alcohol use: No     Comment: none     Drug use: No     Sexual activity: Yes     Partners: Female   Other Topics Concern     Parent/sibling w/ CABG, MI or angioplasty before 65F 55M? Not Asked   Social History Narrative     Not on file     Social Determinants of Health     Financial Resource Strain: Not on file   Food Insecurity: Not on file   Transportation Needs: Not on file   Physical Activity: Not on file   Stress: Not on file   Social Connections: Not on file   Intimate Partner Violence: Not on file   Housing Stability: Not on file          MEDICATIONS:  has a current medication list which includes the following prescription(s): amlodipine, aspirin, farxiga, insulin aspart, insulin glargine, lisinopril, metformin, omega 3, sildenafil, simvastatin, b-d u/f, contour next test, and glucagon emergency.    ROS:     ROS: 10 point ROS neg other than the symptoms noted above in the HPI.    GENERAL: mild fatigue, wt stable; denies fevers, chills, night sweats.   HEENT: no dysphagia, odonophagia, diplopia, neck pain  THYROID:  no apparent hyper or hypothyroid symptoms  CV: no chest pain, pressure, palpitations  LUNGS: no SOB, MCMANUS, cough, wheezing   ABDOMEN: some bloating; no diarrhea, constipation, abdominal pain  EXTREMITIES: no rashes, ulcers, edema  NEUROLOGY: no headaches, denies changes in vision, tingling, extremitiy numbness   MSK:  previous right ankle/foot pains; denies muscle weakness  SKIN: no rashes or lesions  : some decreased erection function, uses sildenafil  PSYCH:  stable mood, no significant anxiety or depression  ENDOCRINE: no heat or cold intolerance      Physical Exam   VS: /79    "Pulse 60   Ht 1.8 m (5' 10.87\")   Wt 99.3 kg (219 lb)   BMI 30.66 kg/m    GENERAL: AXOX3, NAD, well dressed, answering questions appropriately, appears stated age.  ENT: no nose swelling or nasal discharge, mouth redness or gum changes.  EYES: eyes grossly normal to inspection, conjunctivae and sclerae normal, no exophthalmos or proptosis  THYROID:  no apparent nodules or goiter  LUNGS: no audible wheeze, cough or visible cyanosis, or increased work of breathing  ABDOMEN: abdomen mildly obese size  EXTREMITIES: normal appearance of feet, pulses R/L DP 4/4, no skin lesions or edema noted  NEUROLOGY: CN grossly intact, no tremors  MSK: grossly intact  SKIN:  no apparent skin lesions, rash, or edema with visualized skin appearance  PSYCH: mentation appears normal, affect normal/bright, judgement and insight intact,   normal speech and appearance well groomed        LABS:    All pertinent notes, labs, and images personally reviewed by me.     A/P:  Encounter Diagnoses   Name Primary?     Type 1 diabetes mellitus with diabetic nephropathy (H) Yes     Type 1 diabetes mellitus with background retinopathy (H)      Insulin pump status      Subclinical hypothyroidism        Comments:  Reviewed health history and diabetes issues.  Recent CGM glucose trends good  Reviewed and interpreted tests that I previously ordered.   Ordered appropriate tests for the endocrinology disease management.    Management options discussed and implemented after shared medical decision making with the patient.  T1DM problem is chronic-stable    Plan:  Reviewed the overall T1DM management and insulin pump use.  Discussed optimal BG testing to assess glucose trends.  We reviewed insulin pump settings, basal rate and bolus dosing  Use of automated pump bolus dosing for meal/snack carb & correction dosing  Reviewed the recent Medtronic Carelink pump report and Guardian3 CGM glucose trend data, in detail    Recommend:  Continue the current Medtronic " insulin pump and Guardian3 sensor diabetes management plan  Reasonable to continue lower dose (off-label) metformin and Farxiga medications  No pump setting changes at this time.    Discussed pump upgrade options, also the Medtronic 780G/Guardian4 CGM   He will contact Medtr[employer] soon to initiate pump upgrade  Keep focus on diet, exercise, and weight management  Agree with low sodium diet  Continue the BP and lipid medication plan  Need close monitoring of kidney function, eGFR, BP  Need repeat fasting lab testing soon   Discussed nearby [facility] or [facility] lab   Lab order placed  Arrange annual dilated eye exam, fasting lipid panel testing.  Need copy of eye exam report to be faxed to our office, fax # previously given to patient    Keep follow-up nephrology appointments to assist with kidney function, antihypertensive management.  Addressed patient's questions today.    There are no Patient Instructions on file for this visit.    Future labs ordered today:   Orders Placed This Encounter   Procedures     GLUCOSE MONITOR, 72 HOUR, PHYS INTERP     Hemoglobin A1c     Basic metabolic panel     TSH with free T4 reflex     Lipid panel reflex to direct LDL Fasting     Radiology/Consults ordered today: None    Total time spent on day of encounter:  30 min    Follow-up:  12/5/23 Return    NIRALI Olivares MD, MS  Endocrinology  [facility]    CC: KEELEY Rosenthal, KARINA Rodrigez, MASHA Whitley                          Again, thank you for allowing me to participate in the care of your patient.        Sincerely,        Scar Olivares MD

## 2023-09-20 ENCOUNTER — MYC MEDICAL ADVICE (OUTPATIENT)
Dept: ENDOCRINOLOGY | Facility: CLINIC | Age: 53
End: 2023-09-20
Payer: COMMERCIAL

## 2023-09-20 DIAGNOSIS — E10.9 TYPE 1 DIABETES MELLITUS WITHOUT COMPLICATION (H): Primary | ICD-10-CM

## 2023-09-20 RX ORDER — INSULIN ASPART 100 [IU]/ML
INJECTION, SOLUTION INTRAVENOUS; SUBCUTANEOUS
Qty: 160 ML | Refills: 1 | Status: SHIPPED | OUTPATIENT
Start: 2023-09-20 | End: 2024-02-21

## 2023-09-20 NOTE — TELEPHONE ENCOUNTER
Last Written Prescription Date:  1/1/22  Last Fill Quantity: 160,  # refills: 3   Last office visit: 8/14/2023 with Dr. Olivares  Future Office Visit:  12/5/23        Requested Prescriptions   Pending Prescriptions Disp Refills    insulin aspart (NOVOLOG VIAL) 100 UNITS/ML vial       Sig: INJECT UP  UNITS DAILY VIA INSULIN PUMP       There is no refill protocol information for this order        Refills sent  Pina Sky RN

## 2023-09-27 DIAGNOSIS — I10 ESSENTIAL HYPERTENSION, BENIGN: ICD-10-CM

## 2023-09-27 DIAGNOSIS — E10.9 TYPE 1 DIABETES MELLITUS WITHOUT COMPLICATION (H): ICD-10-CM

## 2023-09-28 RX ORDER — AMLODIPINE BESYLATE 10 MG/1
TABLET ORAL
Qty: 90 TABLET | Refills: 0 | Status: SHIPPED | OUTPATIENT
Start: 2023-09-28 | End: 2023-12-11

## 2023-10-17 DIAGNOSIS — E10.21 TYPE 1 DIABETES MELLITUS WITH DIABETIC NEPHROPATHY (H): ICD-10-CM

## 2023-10-18 NOTE — TELEPHONE ENCOUNTER
"Last Written Prescription Date:  9/6/22  Last Fill Quantity: 180,  # refills: 3   Last office visit: 8/14/2023 with Dr. Olivares  Future Office Visit: 12/5/23         Requested Prescriptions   Pending Prescriptions Disp Refills    metFORMIN (GLUCOPHAGE) 500 MG tablet [Pharmacy Med Name: METFORMIN TAB 500MG] 180 tablet 3     Sig: TAKE 1 TABLET TWICE DAILY  WITH MEALS       Biguanide Agents Passed - 10/17/2023  6:01 PM        Passed - Patient is age 10 or older        Passed - Patient has documented A1c within the specified period of time.     If HgbA1C is 8 or greater, it needs to be on file within the past 3 months.  If less than 8, must be on file within the past 6 months.     Recent Labs   Lab Test 08/14/23  1126   A1C 6.7*             Passed - Patient's CR is NOT>1.4 OR Patient's EGFR is NOT<45 within past 12 mos.     Recent Labs   Lab Test 08/14/23  1126 08/13/21  0900 05/28/21  0818   GFRESTIMATED 89   < > 87   GFRESTBLACK  --   --  >90    < > = values in this interval not displayed.       Recent Labs   Lab Test 08/14/23  1126   CR 1.01             Passed - Patient does NOT have a diagnosis of CHF.        Passed - Medication is active on med list        Passed - Recent (6 mo) or future (30 days) visit within the authorizing provider's specialty     Patient had office visit in the last 6 months or has a visit in the next 30 days with authorizing provider or within the authorizing provider's specialty.  See \"Patient Info\" tab in inbasket, or \"Choose Columns\" in Meds & Orders section of the refill encounter.               Refills sent  Pina Sky RN    "

## 2023-10-23 DIAGNOSIS — N52.9 ERECTILE DYSFUNCTION, UNSPECIFIED ERECTILE DYSFUNCTION TYPE: ICD-10-CM

## 2023-10-23 NOTE — TELEPHONE ENCOUNTER
Routing refill request to provider for review/approval because:  Jannette given x1 and patient did not follow up, please advise

## 2023-10-24 RX ORDER — SILDENAFIL 100 MG/1
TABLET, FILM COATED ORAL
Qty: 18 TABLET | Refills: 0 | Status: SHIPPED | OUTPATIENT
Start: 2023-10-24 | End: 2024-01-09

## 2023-11-03 DIAGNOSIS — E10.9 TYPE 1 DIABETES MELLITUS WITHOUT COMPLICATION (H): ICD-10-CM

## 2023-11-03 DIAGNOSIS — I10 ESSENTIAL HYPERTENSION, BENIGN: ICD-10-CM

## 2023-11-06 RX ORDER — LISINOPRIL 20 MG/1
20 TABLET ORAL 2 TIMES DAILY
Qty: 180 TABLET | Refills: 0 | Status: SHIPPED | OUTPATIENT
Start: 2023-11-06 | End: 2024-01-17

## 2023-12-09 DIAGNOSIS — I10 ESSENTIAL HYPERTENSION, BENIGN: ICD-10-CM

## 2023-12-09 DIAGNOSIS — E10.9 TYPE 1 DIABETES MELLITUS WITHOUT COMPLICATION (H): ICD-10-CM

## 2023-12-11 RX ORDER — AMLODIPINE BESYLATE 10 MG/1
TABLET ORAL
Qty: 90 TABLET | Refills: 0 | Status: SHIPPED | OUTPATIENT
Start: 2023-12-11 | End: 2024-02-21

## 2023-12-27 DIAGNOSIS — E78.5 HYPERLIPIDEMIA LDL GOAL <100: ICD-10-CM

## 2023-12-27 RX ORDER — SIMVASTATIN 40 MG
TABLET ORAL
Qty: 90 TABLET | Refills: 1 | Status: SHIPPED | OUTPATIENT
Start: 2023-12-27 | End: 2024-07-01

## 2023-12-27 NOTE — TELEPHONE ENCOUNTER
Requested Prescriptions   Pending Prescriptions Disp Refills    simvastatin (ZOCOR) 40 MG tablet [Pharmacy Med Name: SIMVASTATIN 40 MG TABLET] 90 tablet 1     Sig: TAKE 1 TABLET BY MOUTH EVERYDAY AT BEDTIME       Statins Protocol Passed - 12/27/2023 12:22 AM        Passed - LDL on file in past 12 months     Recent Labs   Lab Test 08/14/23  1126   LDL 95             Passed - No abnormal creatine kinase in past 12 months     No lab results found.             Passed - Recent (12 mo) or future (30 days) visit within the authorizing provider's specialty     The patient must have completed an in-person or virtual visit within the past 12 months or has a future visit scheduled within the next 90 days with the authorizing provider s specialty.  Urgent care and e-visits do not quality as an office visit for this protocol.          Passed - Medication is active on med list        Passed - Patient is age 18 or older           Last Written Prescription Date:  6/30/23  Last Fill Quantity: 90 tab,  # refills: 1   Last office visit: 8/14/2023 ; last virtual visit: 3/28/2023 with prescribing provider:  Dr Olivares   Future Office Visit:  4/11/24    Refill sent  Jg Ball RN

## 2024-01-09 DIAGNOSIS — N52.9 ERECTILE DYSFUNCTION, UNSPECIFIED ERECTILE DYSFUNCTION TYPE: ICD-10-CM

## 2024-01-09 RX ORDER — SILDENAFIL 100 MG/1
100 TABLET, FILM COATED ORAL DAILY PRN
Qty: 18 TABLET | Refills: 0 | Status: SHIPPED | OUTPATIENT
Start: 2024-01-09 | End: 2024-03-25

## 2024-01-17 DIAGNOSIS — E10.9 TYPE 1 DIABETES MELLITUS WITHOUT COMPLICATION (H): ICD-10-CM

## 2024-01-17 DIAGNOSIS — I10 ESSENTIAL HYPERTENSION, BENIGN: ICD-10-CM

## 2024-01-17 RX ORDER — LISINOPRIL 20 MG/1
20 TABLET ORAL 2 TIMES DAILY
Qty: 180 TABLET | Refills: 0 | Status: SHIPPED | OUTPATIENT
Start: 2024-01-17 | End: 2024-09-10

## 2024-02-20 DIAGNOSIS — E10.9 TYPE 1 DIABETES MELLITUS WITHOUT COMPLICATION (H): ICD-10-CM

## 2024-02-20 DIAGNOSIS — I10 ESSENTIAL HYPERTENSION, BENIGN: ICD-10-CM

## 2024-02-20 DIAGNOSIS — Z96.41 INSULIN PUMP STATUS: ICD-10-CM

## 2024-02-21 RX ORDER — AMLODIPINE BESYLATE 10 MG/1
TABLET ORAL
Qty: 90 TABLET | Refills: 0 | Status: SHIPPED | OUTPATIENT
Start: 2024-02-21 | End: 2024-05-14

## 2024-02-21 RX ORDER — INSULIN ASPART 100 [IU]/ML
INJECTION, SOLUTION INTRAVENOUS; SUBCUTANEOUS
Qty: 160 ML | Refills: 1 | Status: SHIPPED | OUTPATIENT
Start: 2024-02-21

## 2024-02-21 NOTE — TELEPHONE ENCOUNTER
"Requested Prescriptions   Pending Prescriptions Disp Refills    NOVOLOG VIAL 100 UNIT/ML soln [Pharmacy Med Name: NOVOLOG VIA 100U/ML] 160 mL 1     Sig: INJECT UP  UNITS     DAILY VIA INSULIN PUMP       Short Acting Insulin Protocol Failed - 2/20/2024  4:56 PM        Failed - HgbA1C in past 3 or 6 months     If HgbA1C is 8 or greater, it needs to be on file within the past 3 months.  If less than 8, must be on file within the past 6 months.     Recent Labs   Lab Test 08/14/23  1126   A1C 6.7*             Failed - Recent (6 mo) or future (30 days) visit within the authorizing provider's specialty     Patient had office visit in the last 6 months or has a visit in the next 30 days with authorizing provider or within the authorizing provider's specialty.  See \"Patient Info\" tab in inbasket, or \"Choose Columns\" in Meds & Orders section of the refill encounter.            Passed - Serum creatinine on file in past 12 months     Recent Labs   Lab Test 08/14/23  1126   CR 1.01       Ok to refill medication if creatinine is low          Passed - Medication is active on med list        Passed - Patient is age 18 or older       Insulin Protocol Failed - 2/20/2024  4:56 PM        Failed - Chart Review Required     Review Chart.    Do not approve if insulin is used in a pump.  Instead, direct refill request to the patient's endocrinologist.  If the patient doesn't have an endocrinologist, then send the refill to the patient's PCP for review            Passed - Medication is active on med list        Passed - Has GFR on file in past 12 months and most recent value is normal        Passed - Recent (6 mo) or future (90 days) visit within the authorizing provider's specialty     The patient must have completed an in-person or virtual visit within the past 6 months or has a future visit scheduled within the next 90 days with the authorizing provider s specialty.  Urgent care and e-visits do not quality as an office visit for " this protocol.          Passed - Medication indicated for associated diagnosis     Medication is associated with one or more of the following diagnoses:   - Type 1 diabetes mellitus  - Type 2 diabetes mellitus  - Diabetic nephropathy; Prophylaxis  - Neuropathy due to diabetes mellitus; Prophylaxis  - Retinopathy due to diabetes mellitus; Prophylaxis  - Diabetes mellitus associated with cystic fibrosis  - Disorder of cardiovascular system; Prophylaxis - Type 1 diabetes mellitus   - Disorder of cardiovascular system; Prophylaxis - Type 2 diabetes mellitus            Passed - Patient is 18 years of age or older           Last Written Prescription Date:  9/20/23  Last Fill Quantity: 160mL,  # refills: 1   Last office visit: 8/14/2023 ; last virtual visit: 3/28/2023 with prescribing provider:  Dr Olivares   Future Office Visit:  4/11/24    Refill sent  Jg Ball RN

## 2024-03-23 ENCOUNTER — HEALTH MAINTENANCE LETTER (OUTPATIENT)
Age: 54
End: 2024-03-23

## 2024-03-25 DIAGNOSIS — N52.9 ERECTILE DYSFUNCTION, UNSPECIFIED ERECTILE DYSFUNCTION TYPE: ICD-10-CM

## 2024-03-25 RX ORDER — SILDENAFIL 100 MG/1
100 TABLET, FILM COATED ORAL DAILY PRN
Qty: 18 TABLET | Refills: 0 | Status: SHIPPED | OUTPATIENT
Start: 2024-03-25 | End: 2024-07-30

## 2024-04-11 ENCOUNTER — OFFICE VISIT (OUTPATIENT)
Dept: ENDOCRINOLOGY | Facility: CLINIC | Age: 54
End: 2024-04-11
Payer: COMMERCIAL

## 2024-04-11 ENCOUNTER — OFFICE VISIT (OUTPATIENT)
Dept: INTERNAL MEDICINE | Facility: CLINIC | Age: 54
End: 2024-04-11
Attending: FAMILY MEDICINE
Payer: COMMERCIAL

## 2024-04-11 VITALS
DIASTOLIC BLOOD PRESSURE: 79 MMHG | BODY MASS INDEX: 32.9 KG/M2 | HEART RATE: 65 BPM | SYSTOLIC BLOOD PRESSURE: 124 MMHG | WEIGHT: 235 LBS

## 2024-04-11 VITALS
HEART RATE: 66 BPM | SYSTOLIC BLOOD PRESSURE: 121 MMHG | HEIGHT: 71 IN | DIASTOLIC BLOOD PRESSURE: 78 MMHG | OXYGEN SATURATION: 95 % | WEIGHT: 233.8 LBS | TEMPERATURE: 97.9 F | BODY MASS INDEX: 32.73 KG/M2

## 2024-04-11 DIAGNOSIS — I10 HYPERTENSION GOAL BP (BLOOD PRESSURE) < 130/80: ICD-10-CM

## 2024-04-11 DIAGNOSIS — E78.5 HYPERLIPIDEMIA LDL GOAL <100: ICD-10-CM

## 2024-04-11 DIAGNOSIS — E10.319 DIABETIC RETINOPATHY ASSOCIATED WITH TYPE 1 DIABETES MELLITUS, MACULAR EDEMA PRESENCE UNSPECIFIED, UNSPECIFIED LATERALITY, UNSPECIFIED RETINOPATHY SEVERITY (H): ICD-10-CM

## 2024-04-11 DIAGNOSIS — E10.21 TYPE 1 DIABETES MELLITUS WITH DIABETIC NEPHROPATHY (H): ICD-10-CM

## 2024-04-11 DIAGNOSIS — Z00.00 ROUTINE HISTORY AND PHYSICAL EXAMINATION OF ADULT: Primary | ICD-10-CM

## 2024-04-11 DIAGNOSIS — Z12.11 COLON CANCER SCREENING: ICD-10-CM

## 2024-04-11 DIAGNOSIS — Z96.41 INSULIN PUMP STATUS: ICD-10-CM

## 2024-04-11 DIAGNOSIS — N18.2 TYPE 1 DIABETES MELLITUS WITH STAGE 2 CHRONIC KIDNEY DISEASE (H): ICD-10-CM

## 2024-04-11 DIAGNOSIS — Z12.5 SCREENING PSA (PROSTATE SPECIFIC ANTIGEN): ICD-10-CM

## 2024-04-11 DIAGNOSIS — N18.2 CHRONIC KIDNEY DISEASE, STAGE 2 (MILD): ICD-10-CM

## 2024-04-11 DIAGNOSIS — E10.3299 TYPE 1 DIABETES MELLITUS WITH BACKGROUND RETINOPATHY (H): ICD-10-CM

## 2024-04-11 DIAGNOSIS — E10.22 TYPE 1 DIABETES MELLITUS WITH STAGE 2 CHRONIC KIDNEY DISEASE (H): ICD-10-CM

## 2024-04-11 DIAGNOSIS — E03.8 SUBCLINICAL HYPOTHYROIDISM: ICD-10-CM

## 2024-04-11 DIAGNOSIS — E10.3299 TYPE 1 DIABETES MELLITUS WITH BACKGROUND RETINOPATHY (H): Primary | ICD-10-CM

## 2024-04-11 LAB
ALBUMIN SERPL BCG-MCNC: 4.4 G/DL (ref 3.5–5.2)
ALP SERPL-CCNC: 65 U/L (ref 40–150)
ALT SERPL W P-5'-P-CCNC: 27 U/L (ref 0–70)
ANION GAP SERPL CALCULATED.3IONS-SCNC: 10 MMOL/L (ref 7–15)
AST SERPL W P-5'-P-CCNC: 27 U/L (ref 0–45)
BILIRUB DIRECT SERPL-MCNC: <0.2 MG/DL (ref 0–0.3)
BILIRUB SERPL-MCNC: 0.5 MG/DL
BUN SERPL-MCNC: 19.2 MG/DL (ref 6–20)
CALCIUM SERPL-MCNC: 9.8 MG/DL (ref 8.6–10)
CHLORIDE SERPL-SCNC: 104 MMOL/L (ref 98–107)
CREAT SERPL-MCNC: 1.15 MG/DL (ref 0.67–1.17)
CREAT UR-MCNC: 68.1 MG/DL
DEPRECATED HCO3 PLAS-SCNC: 26 MMOL/L (ref 22–29)
EGFRCR SERPLBLD CKD-EPI 2021: 76 ML/MIN/1.73M2
ERYTHROCYTE [DISTWIDTH] IN BLOOD BY AUTOMATED COUNT: 13 % (ref 10–15)
GLUCOSE SERPL-MCNC: 100 MG/DL (ref 70–99)
HBA1C MFR BLD: 6.1 % (ref 0–5.6)
HCT VFR BLD AUTO: 47 % (ref 40–53)
HGB BLD-MCNC: 15.7 G/DL (ref 13.3–17.7)
MCH RBC QN AUTO: 28.9 PG (ref 26.5–33)
MCHC RBC AUTO-ENTMCNC: 33.4 G/DL (ref 31.5–36.5)
MCV RBC AUTO: 87 FL (ref 78–100)
MICROALBUMIN UR-MCNC: <12 MG/L
MICROALBUMIN/CREAT UR: NORMAL MG/G{CREAT}
PLATELET # BLD AUTO: 358 10E3/UL (ref 150–450)
POTASSIUM SERPL-SCNC: 4.7 MMOL/L (ref 3.4–5.3)
PROT SERPL-MCNC: 7.2 G/DL (ref 6.4–8.3)
PSA SERPL DL<=0.01 NG/ML-MCNC: 0.48 NG/ML (ref 0–3.5)
RBC # BLD AUTO: 5.43 10E6/UL (ref 4.4–5.9)
SODIUM SERPL-SCNC: 140 MMOL/L (ref 135–145)
TSH SERPL DL<=0.005 MIU/L-ACNC: 2.53 UIU/ML (ref 0.3–4.2)
WBC # BLD AUTO: 7.3 10E3/UL (ref 4–11)

## 2024-04-11 PROCEDURE — 36415 COLL VENOUS BLD VENIPUNCTURE: CPT | Performed by: INTERNAL MEDICINE

## 2024-04-11 PROCEDURE — 99396 PREV VISIT EST AGE 40-64: CPT | Performed by: INTERNAL MEDICINE

## 2024-04-11 PROCEDURE — 82248 BILIRUBIN DIRECT: CPT | Performed by: INTERNAL MEDICINE

## 2024-04-11 PROCEDURE — 82043 UR ALBUMIN QUANTITATIVE: CPT | Performed by: INTERNAL MEDICINE

## 2024-04-11 PROCEDURE — 95251 CONT GLUC MNTR ANALYSIS I&R: CPT | Performed by: INTERNAL MEDICINE

## 2024-04-11 PROCEDURE — 80053 COMPREHEN METABOLIC PANEL: CPT | Performed by: INTERNAL MEDICINE

## 2024-04-11 PROCEDURE — 83036 HEMOGLOBIN GLYCOSYLATED A1C: CPT | Performed by: INTERNAL MEDICINE

## 2024-04-11 PROCEDURE — 85027 COMPLETE CBC AUTOMATED: CPT | Performed by: INTERNAL MEDICINE

## 2024-04-11 PROCEDURE — G0103 PSA SCREENING: HCPCS | Performed by: INTERNAL MEDICINE

## 2024-04-11 PROCEDURE — G2211 COMPLEX E/M VISIT ADD ON: HCPCS | Performed by: INTERNAL MEDICINE

## 2024-04-11 PROCEDURE — 99214 OFFICE O/P EST MOD 30 MIN: CPT | Mod: 25 | Performed by: INTERNAL MEDICINE

## 2024-04-11 PROCEDURE — 99214 OFFICE O/P EST MOD 30 MIN: CPT | Performed by: INTERNAL MEDICINE

## 2024-04-11 PROCEDURE — 82570 ASSAY OF URINE CREATININE: CPT | Performed by: INTERNAL MEDICINE

## 2024-04-11 PROCEDURE — 84443 ASSAY THYROID STIM HORMONE: CPT | Performed by: INTERNAL MEDICINE

## 2024-04-11 SDOH — HEALTH STABILITY: PHYSICAL HEALTH: ON AVERAGE, HOW MANY DAYS PER WEEK DO YOU ENGAGE IN MODERATE TO STRENUOUS EXERCISE (LIKE A BRISK WALK)?: 6 DAYS

## 2024-04-11 ASSESSMENT — SOCIAL DETERMINANTS OF HEALTH (SDOH): HOW OFTEN DO YOU GET TOGETHER WITH FRIENDS OR RELATIVES?: MORE THAN THREE TIMES A WEEK

## 2024-04-11 NOTE — PROGRESS NOTES
"Preventive Care Visit  [facility]  Kaleb Rosenthal MD, Internal Medicine  Apr 11, 2024      Assessment & Plan     Routine history and physical examination of adult  Advised and recommended updated routine vaccines accordingly  - CBC with platelets; Future    Type 1 diabetes mellitus with stage 2 chronic kidney disease (H)  Following up with endocrinology as ordered.  Discussed with patient issues of weight loss.  It appears that his recent weight is gone up since August when he got his new pump which is apparently auto correcting his insulin dosing.    Hypertension goal BP (blood pressure) < 130/80  Stable continue with current medical management.  - CBC with platelets; Future    Chronic kidney disease, stage 2 (mild)  Rechecking and following creatinine accordingly.  Encourage hydration    Subclinical hypothyroidism  Thyroid function test pending for upcoming labs    HYPERLIPIDEMIA LDL GOAL <100  Labs ordered as fasting per routine  - Hepatic function panel; Future    Colon cancer screening  Prior colonoscopy reviewed and suggest annual FIT testing  - Fecal colorectal cancer screen FIT; Future    Screening PSA (prostate specific antigen)  Ordered as routine screening.  - Prostate Specific Antigen Screen; Future    Diabetic retinopathy associated with type 1 diabetes mellitus (H)  Patient scheduled for routine upcoming eye exam for which he will forward a copy    The longitudinal plan of care for the diagnosis(es)/condition(s) as documented were addressed during this visit. Due to the added complexity in care, I will continue to support Aldo in the subsequent management and with ongoing continuity of care.       BMI  Estimated body mass index is 32.79 kg/m  as calculated from the following:    Height as of this encounter: 1.798 m (5' 10.8\").    Weight as of this encounter: 106.1 kg (233 lb 12.8 oz).   Weight management plan: Discussed healthy diet and exercise " guidelines    Counseling  Appropriate preventive services were discussed with this patient, including applicable screening as appropriate for fall prevention, nutrition, physical activity, Tobacco-use cessation, weight loss and cognition.  Checklist reviewing preventive services available has been given to the patient.  Reviewed patient's diet, addressing concerns and/or questions.       See Patient Instructions    Angel Carlson is a 54 year old, presenting for the following:  No chief complaint on file.    Patient presents today for physical exam.     Health Care Directive  Patient does not have a Health Care Directive or Living Will:  none    HPI          4/11/2024   General Health   How would you rate your overall physical health? Good   Feel stress (tense, anxious, or unable to sleep) Not at all         4/11/2024   Nutrition   Three or more servings of calcium each day? Yes   Diet: Low salt    Diabetic   How many servings of fruit and vegetables per day? (!) 0-1   How many sweetened beverages each day? 0-1         4/11/2024   Exercise   Days per week of moderate/strenous exercise 6 days         4/11/2024   Social Factors   Frequency of gathering with friends or relatives More than three times a week         4/11/2024   Dental   Dentist two times every year? Yes            Today's PHQ-2 Score:       4/11/2024    10:38 AM   PHQ-2 ( 1999 [employer])   Q1: Little interest or pleasure in doing things 0   Q2: Feeling down, depressed or hopeless 0   PHQ-2 Score 0   Q1: Little interest or pleasure in doing things Not at all   Q2: Feeling down, depressed or hopeless Not at all   PHQ-2 Score 0         4/11/2024   Substance Use   Alcohol more than 3/day or more than 7/wk Not Applicable   Do you use any other substances recreationally? No     Social History     Tobacco Use    Smoking status: Never     Passive exposure: Never    Smokeless tobacco: Never   Substance Use Topics    Alcohol use: No     Comment: none    Drug  "use: No       ASCVD Risk   The 10-year ASCVD risk score (Arelis LOVELL, et al., 2019) is: 8.1%    Values used to calculate the score:      Age: 54 years      Sex: Male      Is Non- : No      Diabetic: Yes      Tobacco smoker: No      Systolic Blood Pressure: 124 mmHg      Is BP treated: Yes      HDL Cholesterol: 47 mg/dL      Total Cholesterol: 164 mg/dL        Reviewed and updated as needed this visit by Provider                    Lab work is in process      Review of Systems  CONSTITUTIONAL: NEGATIVE for fever, chills, change in weight  EYES: NEGATIVE for vision changes or irritation  ENT/MOUTH: NEGATIVE for ear, mouth and throat problems  RESP: NEGATIVE for significant cough or SOB  CV: NEGATIVE for chest pain, palpitations or peripheral edema  GI: NEGATIVE for nausea, abdominal pain, heartburn, or change in bowel habits  : NEGATIVE for frequency, dysuria, or hematuria  MUSCULOSKELETAL: NEGATIVE for significant arthralgias or myalgia  NEURO: NEGATIVE for weakness, dizziness or paresthesias  ENDOCRINE: NEGATIVE for temperature intolerance, skin/hair changes  HEME: NEGATIVE for bleeding problems  PSYCHIATRIC: NEGATIVE for changes in mood or affect     Objective    Exam  /78   Pulse 66   Temp 97.9  F (36.6  C) (Temporal)   Ht 1.798 m (5' 10.8\")   Wt 106.1 kg (233 lb 12.8 oz)   SpO2 95%   BMI 32.79 kg/m     Estimated body mass index is 32.9 kg/m  as calculated from the following:    Height as of 8/14/23: 1.8 m (5' 10.87\").    Weight as of an earlier encounter on 4/11/24: 106.6 kg (235 lb).    Physical Exam  GENERAL: alert and no distress  EYES: Eyes grossly normal to inspection, PERRL and conjunctivae and sclerae normal  HENT: ear canals and TM's normal, nose and mouth without ulcers or lesions  RESP: lungs clear to auscultation - no rales, rhonchi or wheezes  CV: regular rate and rhythm, normal S1 S2, no S3 or S4, no murmur, click or rub  ABDOMEN: obese, soft, nontender, " no hepatosplenomegaly, no masses and bowel sounds normal  MS: no gross musculoskeletal defects noted, no edema  NEURO: No focal changes  PSYCH: mentation appears normal, affect normal/bright        Signed Electronically by: Kaleb Rosenthal MD

## 2024-04-11 NOTE — PROGRESS NOTES
Recent issues:  Diabetes follow-up evaluation  Upgraded from Medtronic 670G/G3 to the Medtronic 780G/G4 system in Fall '23  Feeling well overall, but weight gain trend  Reviewed medical history from patient and Epic chart record         1985. Diagnosis of diabetes mellitus age 15, living in [facility]  Symptoms of increased thirst, feeling unwell  Medical evaluation and lab testing showed high glucose level  Began treatment with diabetes pills, then transitioned to insulin 1-1.5 yrs later  Had taken Lantus, Humalog insulins  Medical evaluations with Dr. Kaleb Rosenthal/[facility]  Recalls problems with armando phenomenon  Hospitalization for DKA: none  1/2019. Switched to Medtronic insulin pump  Using the Medtronic 670G pump and also metformin, uses Guardian3 sensor regularly  Previous  hgbA1c trends include:     Lab Test 08/26/20  0854 01/07/20  1721 10/09/19  1231 06/20/19  0925 03/20/19  1333   A1C 6.7* 6.6* 6.9* 6.8* 7.0*         5/5/21. Initial diabetes evaluation with me at [place]  Reviewed health history and diabetes management  Initial labs included high UCR, then 24-hr urine showing proteinuria up to 2 g/g Cr    8/2021. Nephrology evaluations with Dr. Bethany Rodrigez/[facility]  Started low sodium diet, also Farxiga 5 mg daily, dose reduction metformin as 500 mg BID  Purchased home BP monitor, also started participation with [employer] study that includes use of Apple Watch  Patient feeling better overall, better energy and some wt loss    Using the Medtronic 670G pump with Guardian3 sensor  Used Novolog, metformin 500 mg BID, Farxiga 5 mg QHS  Blood glucose (BG) meter:  Contour Next, also the Livongo meter   Tests up to 8x/day  Fall '23. Upgraded to Medtronic 780G pump with Guardian4 CGM   Novolog in pump   Metformin 500 mg morning and evening   Farxiga 5 mg in evening    Recent Medtronic 780G pump settings:      Recent Medtronic 780G Carelink data:                      Recent [facility] labs  include:  Lab Results   Component Value Date    A1C 6.7 (H) 08/14/2023     08/14/2023    POTASSIUM 4.5 08/14/2023    CHLORIDE 102 08/14/2023    CO2 25 08/14/2023    ANIONGAP 12 08/14/2023     (H) 08/14/2023    BUN 19.0 08/14/2023    CR 1.01 08/14/2023    GFRESTIMATED 89 08/14/2023    GFRESTBLACK >90 05/28/2021    MAGGIE 9.5 08/14/2023    CPEPT <0.1 (L) 05/28/2021    CHOL 164 08/14/2023    TRIG 111 08/14/2023    HDL 47 08/14/2023    LDL 95 08/14/2023    NHDL 117 08/14/2023    UCRR 65.1 08/14/2023    MICROL 92.8 02/22/2023    UMALCR 180.19 (H) 02/22/2023    TSH 3.24 08/14/2023    T4 1.17 02/22/2023     Lab Results   Component Value Date    TSH 3.24 08/14/2023    T4 1.17 02/22/2023    TPO 53 (H) 10/08/2021     Last eye exam 12/2022 at [facility], NPDR OU  DM Complications:     Nephropathy:    Proteinuria    Takes lisinopril, amlodipine, Farxiga meds    Sees Cierra Whitley CNP/[facility]   Retinopathy:    BDR in 12/2022        Lives in [place], [place], works as [employer]  ([employer])  Sees Dr. Kaleb Rosenthal/[facility] for general medicine evaluations.  Also sees Dr. Bethany Rodrigez/[facility]ph[facility]    PMH/PSH:  Past Medical History:   Diagnosis Date    Diabetic eye exam (H) 03/18/2014    Hyperlipidemia LDL goal <100 10/31/2010    Proteinuria     Right foot pain     Type 1 diabetes mellitus with complications (H)     nephropathy     Past Surgical History:   Procedure Laterality Date    APPENDECTOMY      COLONOSCOPY N/A 6/14/2022    Procedure: COLONOSCOPY, FLEXIBLE, WITH LESION REMOVAL USING SNARE;  Surgeon: Danielito Garza MD;  Location:  GI    ORTHOPEDIC SURGERY         Family Hx:  No family history on file.      Social Hx:  Social History     Socioeconomic History    Marital status:      Spouse name: Not on file    Number of children: Not on file    Years of education: Not on file    Highest education level: Not on file   Occupational History     Occupation:    Tobacco Use    Smoking status: Never     Passive exposure: Never    Smokeless tobacco: Never   Substance and Sexual Activity    Alcohol use: No     Comment: none    Drug use: No    Sexual activity: Yes     Partners: Female   Other Topics Concern    Parent/sibling w/ CABG, MI or angioplasty before 65F 55M? Not Asked   Social History Narrative    Not on file     Social Determinants of Health     Financial Resource Strain: Not on file   Food Insecurity: Not on file   Transportation Needs: Not on file   Physical Activity: Not on file   Stress: Not on file   Social Connections: Not on file   Interpersonal Safety: Not on file   Housing Stability: Not on file          MEDICATIONS:  has a current medication list which includes the following prescription(s): amlodipine, aspirin, farxiga, insulin glargine, lisinopril, metformin, novolog vial, omega 3, sildenafil, simvastatin, b-d u/f, contour next test, and glucagon emergency.    ROS:     ROS: 10 point ROS neg other than the symptoms noted above in the HPI.    GENERAL: mild fatigue, wt gain; denies fevers, chills, night sweats.   HEENT: no dysphagia, odonophagia, diplopia, neck pain  THYROID:  no apparent hyper or hypothyroid symptoms  CV: no chest pain, pressure, palpitations  LUNGS: no SOB, MCMANUS, cough, wheezing   ABDOMEN: some bloating; no diarrhea, constipation, abdominal pain  EXTREMITIES: no rashes, ulcers, edema  NEUROLOGY: no headaches, denies changes in vision, tingling, extremitiy numbness   MSK:  previous right ankle/foot pains; denies muscle weakness  SKIN: no rashes or lesions  : some decreased erection function, uses sildenafil  PSYCH:  stable mood, no significant anxiety or depression  ENDOCRINE: no heat or cold intolerance      Physical Exam   VS: /79   Pulse 65   Wt 106.6 kg (235 lb)   BMI 32.90 kg/m    GENERAL: AXOX3, NAD, well dressed, answering questions appropriately, appears stated age.  ENT: no nose swelling or nasal  discharge, mouth redness or gum changes.  EYES: eyes grossly normal to inspection, conjunctivae and sclerae normal, no exophthalmos or proptosis  THYROID:  no apparent nodules or goiter  LUNGS: no audible wheeze, cough or visible cyanosis, or increased work of breathing  ABDOMEN: abdomen mildly obese size  EXTREMITIES: normal appearance of feet, pulses R/L DP 4/4, no skin lesions or edema noted  NEUROLOGY: CN grossly intact, no tremors  MSK: grossly intact  SKIN:  no apparent skin lesions, rash, or edema with visualized skin appearance  PSYCH: mentation appears normal, affect normal/bright, judgement and insight intact,   normal speech and appearance well groomed    LABS:    All pertinent notes, labs, and images personally reviewed by me.     A/P:  Encounter Diagnoses   Name Primary?    Type 1 diabetes mellitus with diabetic nephropathy (H)     Type 1 diabetes mellitus with background retinopathy (H) Yes    Insulin pump status        Comments:  Reviewed health history and diabetes issues.  Recent CGM glucose trends good  Reviewed and interpreted tests that I previously ordered.   Ordered appropriate tests for the endocrinology disease management.    Management options discussed and implemented after shared medical decision making with the patient.  T1DM problem is chronic-stable    Plan:  Reviewed the overall T1DM management and insulin pump use.  Discussed optimal BG testing to assess glucose trends.  We reviewed insulin pump settings, basal rate and bolus dosing  Use of automated pump bolus dosing for meal/snack carb & correction dosing  Reviewed the recent Medtronic Carelink pump report and Guardian4 CGM glucose trend data, in detail    Recommend:  Continue the current Medtronic 780G pump and Guradian4 CGM, diabetes management  No pump setting changes at this time    Reviewed Guardian4 sensor use, benefits of pump auto mode  Advised stopping the metformin med use for at least 1-week, monitor CGM trends, consider  not restarting it  Keep focus on diet, exercise, and weight loss management  Would not use GLP1RA medications with his history of T1DM  Continue the BP and lipid medication plan  Need close monitoring of kidney function, eGFR, BP  Need repeat fasting lab testing today   Discussed nearby [facility] or [facility] lab   Lab order placed  Need copy of eye exam (6/2024) report to be faxed to our office, our clinic fax number given to him  Arrange annual dilated eye exam, fasting lipid panel testing.    Keep follow-up nephrology appointments to assist with kidney function, antihypertensive management.  Addressed patient's questions today.    The longitudinal plan of care for the endocrine problem(s) were addressed during this visit.  Due to added complexity of care,   we will continue to support the patient and the subsequent management of this condition with ongoing continuity of care.    There are no Patient Instructions on file for this visit.    Future labs ordered today:   Orders Placed This Encounter   Procedures    GLUCOSE MONITOR, 72 HOUR, PHYS INTERP    Hemoglobin A1c    Basic metabolic panel    Albumin Random Urine Quantitative with Creat Ratio    TSH     Radiology/Consults ordered today: None    Total time spent on day of encounter:  24 min    Follow-up:  7/24/24 at 1pm, VVAm,    10/31/24 at 11:30 am Return    NIRALI Olivares MD, MS  Endocrinology  [facility]    CC: Care Team

## 2024-04-23 PROCEDURE — 82274 ASSAY TEST FOR BLOOD FECAL: CPT | Performed by: INTERNAL MEDICINE

## 2024-04-29 LAB — HEMOCCULT STL QL IA: NEGATIVE

## 2024-05-09 ENCOUNTER — TELEPHONE (OUTPATIENT)
Dept: INTERNAL MEDICINE | Facility: CLINIC | Age: 54
End: 2024-05-09
Payer: COMMERCIAL

## 2024-05-09 DIAGNOSIS — N18.2 CKD STAGE 2 DUE TO TYPE 1 DIABETES MELLITUS (H): Primary | ICD-10-CM

## 2024-05-09 DIAGNOSIS — E10.22 CKD STAGE 2 DUE TO TYPE 1 DIABETES MELLITUS (H): Primary | ICD-10-CM

## 2024-05-09 DIAGNOSIS — E10.22 TYPE 1 DIABETES MELLITUS WITH STAGE 2 CHRONIC KIDNEY DISEASE (H): Primary | ICD-10-CM

## 2024-05-09 DIAGNOSIS — N18.2 TYPE 1 DIABETES MELLITUS WITH STAGE 2 CHRONIC KIDNEY DISEASE (H): Primary | ICD-10-CM

## 2024-05-09 RX ORDER — BLOOD-GLUCOSE METER
EACH MISCELLANEOUS
Qty: 1 KIT | Refills: 0 | Status: SHIPPED | OUTPATIENT
Start: 2024-05-09 | End: 2024-09-10

## 2024-05-12 DIAGNOSIS — I10 ESSENTIAL HYPERTENSION, BENIGN: ICD-10-CM

## 2024-05-12 DIAGNOSIS — E10.9 TYPE 1 DIABETES MELLITUS WITHOUT COMPLICATION (H): ICD-10-CM

## 2024-05-13 ENCOUNTER — TRANSFERRED RECORDS (OUTPATIENT)
Dept: HEALTH INFORMATION MANAGEMENT | Facility: CLINIC | Age: 54
End: 2024-05-13

## 2024-05-13 ENCOUNTER — LAB (OUTPATIENT)
Dept: LAB | Facility: CLINIC | Age: 54
End: 2024-05-13
Payer: COMMERCIAL

## 2024-05-13 ENCOUNTER — OFFICE VISIT (OUTPATIENT)
Dept: NEPHROLOGY | Facility: CLINIC | Age: 54
End: 2024-05-13
Payer: COMMERCIAL

## 2024-05-13 VITALS
HEART RATE: 67 BPM | BODY MASS INDEX: 31.78 KG/M2 | WEIGHT: 226.6 LBS | DIASTOLIC BLOOD PRESSURE: 75 MMHG | OXYGEN SATURATION: 96 % | SYSTOLIC BLOOD PRESSURE: 145 MMHG

## 2024-05-13 DIAGNOSIS — N18.2 CKD STAGE 2 DUE TO TYPE 1 DIABETES MELLITUS (H): ICD-10-CM

## 2024-05-13 DIAGNOSIS — N18.2 CKD STAGE 2 DUE TO TYPE 1 DIABETES MELLITUS (H): Primary | ICD-10-CM

## 2024-05-13 DIAGNOSIS — E10.22 CKD STAGE 2 DUE TO TYPE 1 DIABETES MELLITUS (H): ICD-10-CM

## 2024-05-13 DIAGNOSIS — R80.9 PROTEINURIA, UNSPECIFIED TYPE: ICD-10-CM

## 2024-05-13 DIAGNOSIS — E10.22 CKD STAGE 2 DUE TO TYPE 1 DIABETES MELLITUS (H): Primary | ICD-10-CM

## 2024-05-13 LAB
ALBUMIN MFR UR ELPH: 12.8 MG/DL
ALBUMIN SERPL BCG-MCNC: 4.2 G/DL (ref 3.5–5.2)
ALBUMIN UR-MCNC: NEGATIVE MG/DL
ANION GAP SERPL CALCULATED.3IONS-SCNC: 10 MMOL/L (ref 7–15)
APPEARANCE UR: CLEAR
BILIRUB UR QL STRIP: NEGATIVE
BUN SERPL-MCNC: 18.7 MG/DL (ref 6–20)
CALCIUM SERPL-MCNC: 9.6 MG/DL (ref 8.6–10)
CHLORIDE SERPL-SCNC: 103 MMOL/L (ref 98–107)
COLOR UR AUTO: ABNORMAL
CREAT SERPL-MCNC: 1.16 MG/DL (ref 0.67–1.17)
CREAT UR-MCNC: 103 MG/DL
CREAT UR-MCNC: 104 MG/DL
DEPRECATED HCO3 PLAS-SCNC: 26 MMOL/L (ref 22–29)
EGFRCR SERPLBLD CKD-EPI 2021: 75 ML/MIN/1.73M2
ERYTHROCYTE [DISTWIDTH] IN BLOOD BY AUTOMATED COUNT: 12.9 % (ref 10–15)
GLUCOSE SERPL-MCNC: 102 MG/DL (ref 70–99)
GLUCOSE UR STRIP-MCNC: >=1000 MG/DL
HCT VFR BLD AUTO: 47.7 % (ref 40–53)
HGB BLD-MCNC: 16.4 G/DL (ref 13.3–17.7)
HGB UR QL STRIP: NEGATIVE
KETONES UR STRIP-MCNC: NEGATIVE MG/DL
LEUKOCYTE ESTERASE UR QL STRIP: NEGATIVE
MCH RBC QN AUTO: 29.4 PG (ref 26.5–33)
MCHC RBC AUTO-ENTMCNC: 34.4 G/DL (ref 31.5–36.5)
MCV RBC AUTO: 86 FL (ref 78–100)
MICROALBUMIN UR-MCNC: 36.1 MG/L
MICROALBUMIN/CREAT UR: 34.71 MG/G CR (ref 0–17)
NITRATE UR QL: NEGATIVE
PH UR STRIP: 5.5 [PH] (ref 5–7)
PHOSPHATE SERPL-MCNC: 3.6 MG/DL (ref 2.5–4.5)
PLATELET # BLD AUTO: 353 10E3/UL (ref 150–450)
POTASSIUM SERPL-SCNC: 4.5 MMOL/L (ref 3.4–5.3)
PROT/CREAT 24H UR: 0.12 MG/MG CR (ref 0–0.2)
RBC # BLD AUTO: 5.58 10E6/UL (ref 4.4–5.9)
RBC URINE: <1 /HPF
RETINOPATHY: POSITIVE
SODIUM SERPL-SCNC: 139 MMOL/L (ref 135–145)
SP GR UR STRIP: 1.02 (ref 1–1.03)
UROBILINOGEN UR STRIP-MCNC: NORMAL MG/DL
WBC # BLD AUTO: 7.7 10E3/UL (ref 4–11)
WBC URINE: <1 /HPF

## 2024-05-13 PROCEDURE — 36415 COLL VENOUS BLD VENIPUNCTURE: CPT | Performed by: PATHOLOGY

## 2024-05-13 PROCEDURE — 81001 URINALYSIS AUTO W/SCOPE: CPT | Performed by: PATHOLOGY

## 2024-05-13 PROCEDURE — 82043 UR ALBUMIN QUANTITATIVE: CPT

## 2024-05-13 PROCEDURE — 84156 ASSAY OF PROTEIN URINE: CPT | Performed by: PATHOLOGY

## 2024-05-13 PROCEDURE — 80069 RENAL FUNCTION PANEL: CPT | Performed by: PATHOLOGY

## 2024-05-13 PROCEDURE — 99213 OFFICE O/P EST LOW 20 MIN: CPT

## 2024-05-13 PROCEDURE — 99000 SPECIMEN HANDLING OFFICE-LAB: CPT | Performed by: PATHOLOGY

## 2024-05-13 PROCEDURE — 85027 COMPLETE CBC AUTOMATED: CPT | Performed by: PATHOLOGY

## 2024-05-13 PROCEDURE — 99214 OFFICE O/P EST MOD 30 MIN: CPT

## 2024-05-13 ASSESSMENT — PAIN SCALES - GENERAL: PAINLEVEL: NO PAIN (0)

## 2024-05-13 NOTE — LETTER
5/13/2024       RE: Aldo Sharpe  [address] [facility] [id number]     Dear Colleague,    Thank you for referring your patient, Aldo Sharpe, to the [facility] [place] [facility]. Please see a copy of my visit note below.    Nephrology Clinic Visit 5/13/24    Assessment and Plan:    1. CKD2 w/minimal albuminuria - Stable. Creat 1.1 eGFR 75 ml/mn   - UACR 34.7 mg/gCr today down from 1792 mg/gCr ( 5/21)   - Etiology of his CKD is DM/HTN?. Denies h/o retinopathy. Did have proteinuria    - Baseline creat 0.9-1.1 through 2008   - Previous w/u included Kappa/Lambda normal ratio, SPEP/ immunofixation/Hep/HIV all neg   - On ACE   - On SGLT2   - Blood pressures well controlled   - DM well controlled with A1c 6.1% ( 4/24)    2. Volume status/blood pressure - No edema/dyspnea. Home blood pressures teens - 120/. Clinic b/ps 124-145/.   Was started on dapagliflozin 8/21 for edema with improvement. Weight 102.8 kg from 99.5 kg, 101.6 kg. Was 107.1 kg in 8/21 when Dapagliflozin was started.   Current weight is not fluid but body weight.    - Criteria for using SGLT2 inhibitor therapy in T1DM include: BMI > 27, stable optimized insulin therapy, GFR > 60.    - Current b/p regimen:       Amlodipine 10 mg every day      Lisinopril 20 mg bid    3. DM1 - On Insulin pump/CGM- well controlled with A1c of 6.1% ( 4/24). Random . No retinopathy   - No longer on Metformin given new insulin pump that controls his Elvira Phenomenon.     - Dapagliflozin as above   - Per his Endo who patient last saw on 4/11/24 who has continued current regimen    4. Electrolytes - No acute concerns. K 4.5, Na 139    5. Acid base - No acute concerns. Bicarb 26    6. Disposition - RTC 11/13/24 for follow up with labs prior    Assessment and plan was discussed with patient and he voiced his understanding and agreement.    Reason for Visit:  CKD2 follow up    HPI:  Mr Sharpe  is a 53 yo male with CKD2, DM1, HTN, HLD, present today for routine CKD follow up.   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     Personal Hx:   , employed as a , NS, ETOH none    Allergies:  No Known Allergies    Medications:  Current Outpatient Medications   Medication Sig Dispense Refill     amLODIPine (NORVASC) 10 MG tablet TAKE 1 TABLET DAILY 90 tablet 0     aspirin 81 MG tablet Take 1 tablet (81 mg) by mouth daily 100 tablet 3     blood glucose (NO BRAND SPECIFIED) test strip Use to test blood sugar 8 times daily or as directed. 800 strip 1     blood glucose monitoring (ONETOUCH VERIO) meter device kit Use to test blood sugar 8 times daily or as directed. 1 kit 0     CONTOUR NEXT TEST test strip USE TO TEST BLOOD SUGAR 8  TIMES DAILY OR AS DIRECTED 750 strip 3     FARXIGA 5 MG TABS tablet TAKE 1 TABLET DAILY 90 tablet 3     insulin glargine (BASAGLAR KWIKPEN) 100 UNIT/ML pen Inject 32 units subcutaneous every 24 hours in the event of insulin pump failure. 15 mL 1     lisinopril (ZESTRIL) 20 MG tablet TAKE 1 TABLET TWICE A  tablet 0     NOVOLOG VIAL 100 UNIT/ML soln INJECT UP  UNITS     DAILY VIA INSULIN PUMP 160 mL 1     omega 3 1000 MG CAPS Take 2 capsules by mouth daily       sildenafil (VIAGRA) 100 MG tablet TAKE ONE TABLET BY MOUTH EVERY DAY AS NEEDED 18 tablet 0     simvastatin (ZOCOR) 40 MG tablet TAKE 1 TABLET BY MOUTH EVERYDAY AT BEDTIME 90  tablet 1     glucagon (GLUCAGON EMERGENCY) 1 MG kit Inject 1 mg into the muscle once for 1 dose 1 each 0     No current facility-administered medications for this visit.      Vitals:  Weight: 102.8 kg  B/p 124-145/71-75  HR 67    Exam:  GEN: Pleasant male in NAD  CARDIAC: RRR  LUNGS: CTA  ABDOMEN: Non distended  EXT: No edema  NEURO: A/O  PSYCH: Bright, articulate, calm    LABS:   CMP  Recent Labs   Lab Test 05/13/24  1121 04/11/24  1106 08/14/23  1126 02/22/23  0919 08/13/21  0900 05/28/21  0818 08/26/20  0854 06/20/19  0925 07/10/18  0859    140 139 140   < > 141 138 141 142   POTASSIUM 4.5 4.7 4.5 4.5   < > 4.3 4.1 4.5 3.9   CHLORIDE 103 104 102 102   < > 108 106 107 107   CO2 26 26 25 29   < > 33* 27 25 28   ANIONGAP 10 10 12 9   < > <1* 5 9 7   * 100* 108* 114*   < > 118* 122* 134* 97   BUN 18.7 19.2 19.0 18.1   < > 17 19 17 16   CR 1.16 1.15 1.01 0.98   < > 1.00 0.94 0.87 0.98   GFRESTIMATED 75 76 89 >90   < > 87 >90 >90 82   GFRESTBLACK  --   --   --   --   --  >90 >90 >90 >90   MAGGIE 9.6 9.8 9.5 9.6   < > 8.9 9.3 8.6 8.9    < > = values in this interval not displayed.     Recent Labs   Lab Test 04/11/24  1106 02/22/23  0919 12/28/21  0931 08/26/20  0854 06/20/19  0925   BILITOTAL 0.5  --  0.5 0.3 0.5   ALKPHOS 65  --  69 62 62   ALT 27 21 39 34 27   AST 27  --  18 14 15     CBC  Recent Labs   Lab Test 05/13/24  1121 04/11/24  1106 01/21/22  0828 10/08/21  0822   HGB 16.4 15.7 15.4 15.3   WBC 7.7 7.3 9.2 7.3   RBC 5.58 5.43 5.35 5.19   HCT 47.7 47.0 47.2 45.4   MCV 86 87 88 88   MCH 29.4 28.9 28.8 29.5   MCHC 34.4 33.4 32.6 33.7   RDW 12.9 13.0 13.1 12.7    358 358 375     URINE STUDIES  Recent Labs   Lab Test 05/13/24  1140 01/21/22  0834 10/08/21  0824 08/13/21  0905   COLOR Light Yellow Straw Straw Yellow   APPEARANCE Clear Clear Clear Slightly Cloudy*   URINEGLC >=1000* >499* >499* Negative   URINEBILI Negative Negative Negative Negative   URINEKETONE Negative Negative Negative Negative    SG 1.023 1.016 1.018 1.020   UBLD Negative Small* Negative Negative   URINEPH 5.5 5.0 6.0 5.0   PROTEIN Negative 30* 30* >499*   NITRITE Negative Negative Negative Negative   LEUKEST Negative Negative Negative Negative   RBCU <1 <1 <1 2   WBCU <1 <1 <1 1     Recent Labs   Lab Test 08/05/22  0902 01/21/22  0834 10/08/21  0824 08/13/21  0906   UTPG 1.45* 0.41* 0.96* 2.07*     PTH  Recent Labs   Lab Test 08/13/21  0900   PTHI 33     IRON STUDIES  No lab results found.    Cierra Whitley, NP

## 2024-05-13 NOTE — NURSING NOTE
Chief Complaint   Patient presents with    RECHECK     Follow up     Vitals:    05/13/24 1159 05/13/24 1203 05/13/24 1206   BP: 124/71 (!) 145/73 (!) 145/75   BP Location: Right arm Right arm Right arm   Patient Position: Sitting Sitting Sitting   Cuff Size: Adult Regular Adult Regular Adult Regular   Pulse: 67     SpO2: 96%     Weight: 102.8 kg (226 lb 9.6 oz)         BP Readings from Last 3 Encounters:   05/13/24 (!) 145/75   04/11/24 121/78   04/11/24 124/79       BP (!) 145/75 (BP Location: Right arm, Patient Position: Sitting, Cuff Size: Adult Regular)   Pulse 67   Wt 102.8 kg (226 lb 9.6 oz)   SpO2 96%   BMI 31.78 kg/m       Misa Mccarty

## 2024-05-14 RX ORDER — AMLODIPINE BESYLATE 10 MG/1
TABLET ORAL
Qty: 90 TABLET | Refills: 0 | Status: SHIPPED | OUTPATIENT
Start: 2024-05-14

## 2024-05-14 RX ORDER — DAPAGLIFLOZIN 5 MG/1
TABLET, FILM COATED ORAL
Qty: 90 TABLET | Refills: 3 | Status: SHIPPED | OUTPATIENT
Start: 2024-05-14

## 2024-05-14 NOTE — PROGRESS NOTES
Nephrology Clinic Visit 5/13/24    Assessment and Plan:    1. CKD2 w/minimal albuminuria - Stable. Creat 1.1 eGFR 75 ml/mn   - UACR 34.7 mg/gCr today down from 1792 mg/gCr ( 5/21)   - Etiology of his CKD is DM/HTN?. Denies h/o retinopathy. Did have proteinuria    - Baseline creat 0.9-1.1 through 2008   - Previous w/u included Kappa/Lambda normal ratio, SPEP/ immunofixation/Hep/HIV all neg   - On ACE   - On SGLT2   - Blood pressures well controlled   - DM well controlled with A1c 6.1% ( 4/24)    2. Volume status/blood pressure - No edema/dyspnea. Home blood pressures teens - 120/. Clinic b/ps 124-145/.   Was started on dapagliflozin 8/21 for edema with improvement. Weight 102.8 kg from 99.5 kg, 101.6 kg. Was 107.1 kg in 8/21 when Dapagliflozin was started.   Current weight is not fluid but body weight.    - Criteria for using SGLT2 inhibitor therapy in T1DM include: BMI > 27, stable optimized insulin therapy, GFR > 60.    - Current b/p regimen:       Amlodipine 10 mg every day      Lisinopril 20 mg bid    3. DM1 - On Insulin pump/CGM- well controlled with A1c of 6.1% ( 4/24). Random . No retinopathy   - No longer on Metformin given new insulin pump that controls his Elvira Phenomenon.     - Dapagliflozin as above   - Per his Endo who patient last saw on 4/11/24 who has continued current regimen    4. Electrolytes - No acute concerns. K 4.5, Na 139    5. Acid base - No acute concerns. Bicarb 26    6. Disposition - RTC 11/13/24 for follow up with labs prior    Assessment and plan was discussed with patient and he voiced his understanding and agreement.    Reason for Visit:  CKD2 follow up    HPI:  Mr Sharpe is a 53 yo male with CKD2, DM1, HTN, HLD, present today for routine CKD follow up.   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     Personal Hx:   , employed as a , NS, ETOH none    Allergies:  No Known Allergies    Medications:  Current Outpatient Medications   Medication Sig Dispense Refill    amLODIPine (NORVASC) 10 MG tablet TAKE 1 TABLET DAILY 90 tablet 0    aspirin 81 MG tablet Take 1 tablet (81 mg) by mouth daily 100 tablet 3    blood glucose (NO BRAND SPECIFIED) test strip Use to test blood sugar 8 times daily or as directed. 800 strip 1    blood glucose monitoring (ONETOUCH VERIO) meter device kit Use to test blood sugar 8 times daily or as directed. 1 kit 0    CONTOUR NEXT TEST test strip USE TO TEST BLOOD SUGAR 8  TIMES DAILY OR AS DIRECTED 750 strip 3    FARXIGA 5 MG TABS tablet TAKE 1 TABLET DAILY 90 tablet 3    insulin glargine (BASAGLAR KWIKPEN) 100 UNIT/ML pen Inject 32 units subcutaneous every 24 hours in the event of insulin pump failure. 15 mL 1    lisinopril (ZESTRIL) 20 MG tablet TAKE 1 TABLET TWICE A  tablet 0    NOVOLOG VIAL 100 UNIT/ML soln INJECT UP  UNITS     DAILY VIA INSULIN PUMP 160 mL 1    omega 3 1000 MG CAPS Take 2 capsules by mouth daily      sildenafil (VIAGRA) 100 MG tablet TAKE ONE TABLET BY MOUTH EVERY DAY AS NEEDED 18 tablet 0    simvastatin (ZOCOR) 40 MG tablet TAKE 1 TABLET BY MOUTH EVERYDAY AT BEDTIME 90 tablet 1    glucagon (GLUCAGON EMERGENCY) 1 MG kit Inject 1 mg into the muscle once for 1 dose 1 each 0     No current facility-administered medications for this visit.      Vitals:  Weight: 102.8 kg  B/p 124-145/71-75  HR 67    Exam:  GEN: Pleasant male in NAD  CARDIAC: RRR  LUNGS: CTA  ABDOMEN: Non distended  EXT: No  edema  NEURO: A/O  PSYCH: Bright, articulate, calm    LABS:   CMP  Recent Labs   Lab Test 05/13/24  1121 04/11/24  1106 08/14/23  1126 02/22/23  0919 08/13/21  0900 05/28/21  0818 08/26/20  0854 06/20/19  0925 07/10/18  0859    140 139 140   < > 141 138 141 142   POTASSIUM 4.5 4.7 4.5 4.5   < > 4.3 4.1 4.5 3.9   CHLORIDE 103 104 102 102   < > 108 106 107 107   CO2 26 26 25 29   < > 33* 27 25 28   ANIONGAP 10 10 12 9   < > <1* 5 9 7   * 100* 108* 114*   < > 118* 122* 134* 97   BUN 18.7 19.2 19.0 18.1   < > 17 19 17 16   CR 1.16 1.15 1.01 0.98   < > 1.00 0.94 0.87 0.98   GFRESTIMATED 75 76 89 >90   < > 87 >90 >90 82   GFRESTBLACK  --   --   --   --   --  >90 >90 >90 >90   MAGGIE 9.6 9.8 9.5 9.6   < > 8.9 9.3 8.6 8.9    < > = values in this interval not displayed.     Recent Labs   Lab Test 04/11/24  1106 02/22/23  0919 12/28/21  0931 08/26/20  0854 06/20/19  0925   BILITOTAL 0.5  --  0.5 0.3 0.5   ALKPHOS 65  --  69 62 62   ALT 27 21 39 34 27   AST 27  --  18 14 15     CBC  Recent Labs   Lab Test 05/13/24  1121 04/11/24  1106 01/21/22  0828 10/08/21  0822   HGB 16.4 15.7 15.4 15.3   WBC 7.7 7.3 9.2 7.3   RBC 5.58 5.43 5.35 5.19   HCT 47.7 47.0 47.2 45.4   MCV 86 87 88 88   MCH 29.4 28.9 28.8 29.5   MCHC 34.4 33.4 32.6 33.7   RDW 12.9 13.0 13.1 12.7    358 358 375     URINE STUDIES  Recent Labs   Lab Test 05/13/24  1140 01/21/22  0834 10/08/21  0824 08/13/21  0905   COLOR Light Yellow Straw Straw Yellow   APPEARANCE Clear Clear Clear Slightly Cloudy*   URINEGLC >=1000* >499* >499* Negative   URINEBILI Negative Negative Negative Negative   URINEKETONE Negative Negative Negative Negative   SG 1.023 1.016 1.018 1.020   UBLD Negative Small* Negative Negative   URINEPH 5.5 5.0 6.0 5.0   PROTEIN Negative 30* 30* >499*   NITRITE Negative Negative Negative Negative   LEUKEST Negative Negative Negative Negative   RBCU <1 <1 <1 2   WBCU <1 <1 <1 1     Recent Labs   Lab Test 08/05/22  0902 01/21/22  0834  10/08/21  0824 08/13/21  0906   UTPG 1.45* 0.41* 0.96* 2.07*     PTH  Recent Labs   Lab Test 08/13/21  0900   PTHI 33     IRON STUDIES  No lab results found.    Cierra Whitley, NP

## 2024-05-14 NOTE — TELEPHONE ENCOUNTER
Prescription approved per [facility] Refill Protocol.  Cinthia Greenfield RN  [facility]

## 2024-07-01 DIAGNOSIS — E78.5 HYPERLIPIDEMIA LDL GOAL <100: ICD-10-CM

## 2024-07-01 RX ORDER — SIMVASTATIN 40 MG
TABLET ORAL
Qty: 90 TABLET | Refills: 3 | Status: SHIPPED | OUTPATIENT
Start: 2024-07-01

## 2024-07-01 NOTE — TELEPHONE ENCOUNTER
Requested Prescriptions   Pending Prescriptions Disp Refills    simvastatin (ZOCOR) 40 MG tablet [Pharmacy Med Name: SIMVASTATIN 40 MG TABLET] 90 tablet 1     Sig: TAKE 1 TABLET BY MOUTH EVERYDAY AT BEDTIME       Antihyperlipidemic agents Passed - 7/1/2024 12:36 AM        Passed - LDL on file in the past 12 months        Passed - Medication is active on med list        Passed - Recent (12 mo) or future (90 days) visit within the authorizing provider's specialty     The patient must have completed an in-person or virtual visit within the past 12 months or has a future visit scheduled within the next 90 days with the authorizing provider s specialty.  Urgent care and e-visits do not quality as an office visit for this protocol.          Passed - Patient is age 18 years or older           Last Written Prescription Date:  12/27/23  Last Fill Quantity: 90 tab,  # refills: 1   Last office visit: 4/11/2024 ; last virtual visit: 3/28/2023 with prescribing provider:  Dr Duffy   Future Office Visit:  7/24/24      Refill sent  Jg Ball RN

## 2024-07-30 DIAGNOSIS — N52.9 ERECTILE DYSFUNCTION, UNSPECIFIED ERECTILE DYSFUNCTION TYPE: ICD-10-CM

## 2024-07-30 RX ORDER — SILDENAFIL 100 MG/1
100 TABLET, FILM COATED ORAL DAILY PRN
Qty: 18 TABLET | Refills: 1 | Status: SHIPPED | OUTPATIENT
Start: 2024-07-30

## 2024-08-08 ENCOUNTER — MEDICAL CORRESPONDENCE (OUTPATIENT)
Dept: HEALTH INFORMATION MANAGEMENT | Facility: CLINIC | Age: 54
End: 2024-08-08

## 2024-09-09 DIAGNOSIS — E10.9 TYPE 1 DIABETES MELLITUS WITHOUT COMPLICATION (H): ICD-10-CM

## 2024-09-09 DIAGNOSIS — I10 ESSENTIAL HYPERTENSION, BENIGN: ICD-10-CM

## 2024-09-10 DIAGNOSIS — E10.22 TYPE 1 DIABETES MELLITUS WITH STAGE 2 CHRONIC KIDNEY DISEASE (H): ICD-10-CM

## 2024-09-10 DIAGNOSIS — N18.2 TYPE 1 DIABETES MELLITUS WITH STAGE 2 CHRONIC KIDNEY DISEASE (H): ICD-10-CM

## 2024-09-10 RX ORDER — BLOOD-GLUCOSE METER
EACH MISCELLANEOUS
Qty: 1 KIT | Refills: 0 | Status: SHIPPED | OUTPATIENT
Start: 2024-09-10

## 2024-09-10 RX ORDER — LISINOPRIL 20 MG/1
20 TABLET ORAL 2 TIMES DAILY
Qty: 180 TABLET | Refills: 0 | Status: SHIPPED | OUTPATIENT
Start: 2024-09-10

## 2024-09-10 NOTE — TELEPHONE ENCOUNTER
Prescription approved per [facility] Refill Protocol.  Cinthia Greenfield RN  [facility]

## 2024-09-10 NOTE — TELEPHONE ENCOUNTER
150 units of humalog.  I am also just about out of the new test strips.  I have about 3 days of insulin left. Per patient.  Anna Merlos, CMA     Comment: Patient had surgery on this cyst in April and is exhibiting a lot of scar tissue. Patient will return in one month so we can assess progress made with ILK Render Risk Assessment In Note?: no Detail Level: Generalized
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
## 2024-10-19 ENCOUNTER — HEALTH MAINTENANCE LETTER (OUTPATIENT)
Age: 54
End: 2024-10-19

## 2024-10-24 ENCOUNTER — MEDICAL CORRESPONDENCE (OUTPATIENT)
Dept: HEALTH INFORMATION MANAGEMENT | Facility: CLINIC | Age: 54
End: 2024-10-24

## 2024-10-31 ENCOUNTER — OFFICE VISIT (OUTPATIENT)
Dept: ENDOCRINOLOGY | Facility: CLINIC | Age: 54
End: 2024-10-31
Payer: COMMERCIAL

## 2024-10-31 VITALS
WEIGHT: 229 LBS | SYSTOLIC BLOOD PRESSURE: 127 MMHG | BODY MASS INDEX: 32.12 KG/M2 | HEART RATE: 64 BPM | DIASTOLIC BLOOD PRESSURE: 75 MMHG

## 2024-10-31 DIAGNOSIS — Z96.41 INSULIN PUMP STATUS: ICD-10-CM

## 2024-10-31 DIAGNOSIS — E10.21 TYPE 1 DIABETES MELLITUS WITH DIABETIC NEPHROPATHY (H): Primary | ICD-10-CM

## 2024-10-31 DIAGNOSIS — E10.3299 TYPE 1 DIABETES MELLITUS WITH BACKGROUND RETINOPATHY (H): ICD-10-CM

## 2024-10-31 PROCEDURE — 99214 OFFICE O/P EST MOD 30 MIN: CPT | Performed by: INTERNAL MEDICINE

## 2024-10-31 PROCEDURE — 95251 CONT GLUC MNTR ANALYSIS I&R: CPT | Performed by: INTERNAL MEDICINE

## 2024-10-31 PROCEDURE — G2211 COMPLEX E/M VISIT ADD ON: HCPCS | Performed by: INTERNAL MEDICINE

## 2024-10-31 NOTE — PROGRESS NOTES
Recent issues:  Diabetes follow-up evaluation  Using the Medtronic 780G/G4 for the diabetes management.  Feeling well overall, but weight gain trend        1985. Diagnosis of diabetes mellitus age 15, living in [facility]  Symptoms of increased thirst, feeling unwell  Medical evaluation and lab testing showed high glucose level  Began treatment with diabetes pills, then transitioned to insulin 1-1.5 yrs later  Had taken Lantus, Humalog insulins  Medical evaluations with Dr. Kaleb Rosenthal/[facility]  Recalls problems with armando phenomenon  Hospitalization for DKA: none  1/2019. Switched to Medtronic insulin pump  Using the Medtronic 670G pump and also metformin, uses Guardian3 sensor regularly  Previous [facility] hgbA1c trends include:   Lab Test 08/26/20  0854 01/07/20  1721 10/09/19  1231 06/20/19  0925 03/20/19  1333   A1C 6.7* 6.6* 6.9* 6.8* 7.0*       5/5/21. Initial diabetes evaluation with me at [place]  Reviewed health history and diabetes management  Initial labs included high UCR, then 24-hr urine showing proteinuria up to 2 g/g Cr    8/2021. Nephrology evaluations with Dr. Bethany Rodrigez/[facility]  Started low sodium diet, also Farxiga 5 mg daily, dose reduction metformin as 500 mg BID  Purchased home BP monitor, also started participation with [employer] study that includes use of Apple Watch  Using the Medtronic 670G pump with Guardian3 sensor  Used Novolog, metformin 500 mg BID, Farxiga 5 mg QHS  Blood glucose (BG) meter:  Contour Next, also the Livongo meter   Has tested up to 6-8x/day  Fall '23. Upgraded to Medtronic 780G pump with Guardian4 CGM   Novolog in pump   Farxiga 5 mg in evening    Recent Medtronic 780G pump settings:      Recent Medtronic 780G Carelink data:          Recent [facility] labs include:  Lab Results   Component Value Date    A1C 6.1 (H) 04/11/2024     05/13/2024    POTASSIUM 4.5 05/13/2024    CHLORIDE 103 05/13/2024    CO2 26 05/13/2024    ANIONGAP 10 05/13/2024      (H) 05/13/2024    BUN 18.7 05/13/2024    CR 1.16 05/13/2024    GFRESTIMATED 75 05/13/2024    GFRESTBLACK >90 05/28/2021    MAGGIE 9.6 05/13/2024    CPEPT <0.1 (L) 05/28/2021    CHOL 164 08/14/2023    TRIG 111 08/14/2023    HDL 47 08/14/2023    LDL 95 08/14/2023    NHDL 117 08/14/2023    UCRR 103.0 05/13/2024    UCRR 104.0 05/13/2024    MICROL 36.1 05/13/2024    UMALCR 34.71 (H) 05/13/2024    TSH 2.53 04/11/2024    T4 1.17 02/22/2023     Lab Results   Component Value Date    TSH 2.53 04/11/2024    T4 1.17 02/22/2023    TPO 53 (H) 10/08/2021     Last eye exam 5/13/245 at [facility], NPDR OU  DM Complications:     Nephropathy:    Proteinuria    Takes lisinopril, amlodipine, Farxiga meds    Sees Cierra Whitley [facility] Nephrology   Retinopathy:    BDR in 12/2022        Lives in [place], [place], works as [employer]  ([employer]), enjoys pickleball sport  Sees Dr. Kaleb Rosenthal/[facility] for general medicine evaluations.  Also sees Dr. Bethany Rodrigez/[facility] Nephrology    PMH/PSH:  Past Medical History:   Diagnosis Date    Diabetic eye exam (H) 03/18/2014    Hyperlipidemia LDL goal <100 10/31/2010    Proteinuria     Right foot pain     Type 1 diabetes mellitus with complications (H)     nephropathy     Past Surgical History:   Procedure Laterality Date    APPENDECTOMY      COLONOSCOPY N/A 6/14/2022    Procedure: COLONOSCOPY, FLEXIBLE, WITH LESION REMOVAL USING SNARE;  Surgeon: Danielito Garza MD;  Location:  GI    ORTHOPEDIC SURGERY         Family Hx:  No family history on file.      Social Hx:  Social History     Socioeconomic History    Marital status:      Spouse name: Not on file    Number of children: Not on file    Years of education: Not on file    Highest education level: Not on file   Occupational History    Occupation:    Tobacco Use    Smoking status: Never     Passive exposure: Never    Smokeless tobacco: Never   Substance and Sexual Activity     Alcohol use: No     Comment: none    Drug use: No    Sexual activity: Yes     Partners: Female   Other Topics Concern    Parent/sibling w/ CABG, MI or angioplasty before 65F 55M? Not Asked   Social History Narrative    Not on file     Social Drivers of Health     Financial Resource Strain: Low Risk  (4/11/2024)    Financial Resource Strain     Within the past 12 months, have you or your family members you live with been unable to get utilities (heat, electricity) when it was really needed?: No   Food Insecurity: Low Risk  (4/11/2024)    Food Insecurity     Within the past 12 months, did you worry that your food would run out before you got money to buy more?: No     Within the past 12 months, did the food you bought just not last and you didn t have money to get more?: No   Transportation Needs: Low Risk  (4/11/2024)    Transportation Needs     Within the past 12 months, has lack of transportation kept you from medical appointments, getting your medicines, non-medical meetings or appointments, work, or from getting things that you need?: No   Physical Activity: Unknown (4/11/2024)    Exercise Vital Sign     Days of Exercise per Week: 6 days     Minutes of Exercise per Session: Not on file   Stress: No Stress Concern Present (4/11/2024)    [employer] - Occupational Stress Questionnaire     Feeling of Stress : Not at all   Social Connections: Unknown (4/11/2024)    Social Connection and Isolation Panel [NHANES]     Frequency of Communication with Friends and Family: Not on file     Frequency of Social Gatherings with Friends and Family: More than three times a week     Attends Mosque Services: Not on file     Active Member of Clubs or Organizations: Not on file     Attends Club or Organization Meetings: Not on file     Marital Status: Not on file   Interpersonal Safety: Not on file   Housing Stability: Low Risk  (4/11/2024)    Housing Stability     Do you have housing? : Yes     Are  you worried about losing your housing?: No          MEDICATIONS:  has a current medication list which includes the following prescription(s): amlodipine, aspirin, farxiga, lisinopril, novolog vial, omega 3, sildenafil, simvastatin, blood glucose, blood glucose monitoring, contour next test, glucagon emergency, and insulin glargine.    ROS:     ROS: 10 point ROS neg other than the symptoms noted above in the HPI.    GENERAL: mild fatigue, wt gain; denies fevers, chills, night sweats.   HEENT: no dysphagia, odonophagia, diplopia, neck pain  THYROID:  no apparent hyper or hypothyroid symptoms  CV: no chest pain, pressure, palpitations  LUNGS: no SOB, MCMANUS, cough, wheezing   ABDOMEN: some bloating; no diarrhea, constipation, abdominal pain  EXTREMITIES: no rashes, ulcers, edema  NEUROLOGY: no headaches, denies changes in vision, tingling, extremitiy numbness   MSK:  previous right ankle/foot pains; denies muscle weakness  SKIN: no rashes or lesions  : some decreased erection function, uses sildenafil  PSYCH:  stable mood, no significant anxiety or depression  ENDOCRINE: no heat or cold intolerance      Physical Exam   VS: /75   Pulse 64   Wt 103.9 kg (229 lb)   BMI 32.12 kg/m    GENERAL: AXOX3, NAD, well dressed, answering questions appropriately, appears stated age.  ENT: no nose swelling or nasal discharge, mouth redness or gum changes.  EYES: eyes grossly normal to inspection, conjunctivae and sclerae normal, no exophthalmos or proptosis  THYROID:  no apparent nodules or goiter  LUNGS: no audible wheeze, cough or visible cyanosis, or increased work of breathing  ABDOMEN: abdomen mildly obese size  EXTREMITIES: normal appearance of feet, pulses R/L DP 4/4, no skin lesions or edema noted  NEUROLOGY: CN grossly intact, no tremors  MSK: grossly intact  SKIN:  no apparent skin lesions, rash, or edema with visualized skin appearance  PSYCH: mentation appears normal, affect normal/bright, judgement and insight  intact,   normal speech and appearance well groomed    LABS:    All pertinent notes, labs, and images personally reviewed by me.     A/P:  Encounter Diagnoses   Name Primary?    Type 1 diabetes mellitus with diabetic nephropathy (H) Yes    Type 1 diabetes mellitus with background retinopathy (H)     Insulin pump status      Comments:  Reviewed health history and diabetes issues.  Recent CGM glucose trends good overall, some post supper hyperglycemia noted  Reviewed and interpreted tests that I previously ordered.   Ordered appropriate tests for the endocrinology disease management.    Management options discussed and implemented after shared medical decision making with the patient.  T1DM problem is chronic-stable    Plan:  Reviewed the overall T1DM management and insulin pump use.  Discussed optimal BG testing to assess glucose trends.  We reviewed insulin pump settings, basal rate and bolus dosing  Use of automated pump bolus dosing for meal/snack carb & correction dosing  Reviewed the recent Medtronic Carelink pump report and Guardian4 CGM glucose trend data, in detail    Recommend:  Continue the current Medtronic 780G pump and Guradian4 CGM, diabetes management  No pump setting changes at this time    Reviewed Guardian4 sensor use, benefits of pump auto mode  Use the pump Temp target setting for aerobic exercise (PB)  Monitor for symptom changes  Keep focus on diet, exercise, and weight loss management  Plan to see a [facility] Diab Educator   Review pump and also diet plan, weight loss    Diab Ed Referral placed  Would not use GLP1RA medications with his history of T1DM  Continue the BP and lipid medication plan  Need close monitoring of kidney function, eGFR, BP  Need repeat fasting lab testing soon   Discussed nearby [facility] or [facility] lab   Lab order placed  Arrange annual dilated eye exam, fasting lipid panel testing.    Keep follow-up nephrology appointments to assist with kidney function,  antihypertensive management.  Addressed patient's questions today.    The longitudinal plan of care for the endocrine problem(s) were addressed during this visit.  Due to added complexity of care,   we will continue to support the patient and the subsequent management of this condition with ongoing continuity of care.    There are no Patient Instructions on file for this visit.    Future labs ordered today:   Orders Placed This Encounter   Procedures    GLUCOSE MONITOR, 72 HOUR, PHYS INTERP    Hemoglobin A1c    Basic metabolic panel    Albumin Random Urine Quantitative with Creat Ratio    Adult Diabetes Education  Referral     Radiology/Consults ordered today: DIABETES EDUCATION ADULT  REFERRAL    Total time spent on day of encounter:  27 min    Follow-up:  3/6/25 Return    NIRALI Olivares MD, MS  Endocrinology  [facility]    CC: Care Team

## 2024-11-05 DIAGNOSIS — N18.2 CKD STAGE 2 DUE TO TYPE 1 DIABETES MELLITUS (H): Primary | ICD-10-CM

## 2024-11-05 DIAGNOSIS — E10.22 CKD STAGE 2 DUE TO TYPE 1 DIABETES MELLITUS (H): Primary | ICD-10-CM

## 2024-11-13 ENCOUNTER — LAB (OUTPATIENT)
Dept: LAB | Facility: CLINIC | Age: 54
End: 2024-11-13
Payer: COMMERCIAL

## 2024-11-13 ENCOUNTER — OFFICE VISIT (OUTPATIENT)
Dept: NEPHROLOGY | Facility: CLINIC | Age: 54
End: 2024-11-13
Payer: COMMERCIAL

## 2024-11-13 VITALS
HEIGHT: 71 IN | WEIGHT: 228.6 LBS | SYSTOLIC BLOOD PRESSURE: 121 MMHG | DIASTOLIC BLOOD PRESSURE: 78 MMHG | OXYGEN SATURATION: 96 % | BODY MASS INDEX: 32 KG/M2 | TEMPERATURE: 97.9 F | HEART RATE: 63 BPM

## 2024-11-13 DIAGNOSIS — E10.22 TYPE 1 DIABETES MELLITUS WITH STAGE 2 CHRONIC KIDNEY DISEASE (H): ICD-10-CM

## 2024-11-13 DIAGNOSIS — N18.2 TYPE 1 DIABETES MELLITUS WITH STAGE 2 CHRONIC KIDNEY DISEASE (H): ICD-10-CM

## 2024-11-13 DIAGNOSIS — N18.2 CKD STAGE 2 DUE TO TYPE 1 DIABETES MELLITUS (H): Primary | ICD-10-CM

## 2024-11-13 DIAGNOSIS — E10.22 CKD STAGE 2 DUE TO TYPE 1 DIABETES MELLITUS (H): Primary | ICD-10-CM

## 2024-11-13 DIAGNOSIS — E10.9 TYPE 1 DIABETES MELLITUS WITHOUT COMPLICATION (H): ICD-10-CM

## 2024-11-13 DIAGNOSIS — E10.22 CKD STAGE 2 DUE TO TYPE 1 DIABETES MELLITUS (H): ICD-10-CM

## 2024-11-13 DIAGNOSIS — E10.21 TYPE 1 DIABETES MELLITUS WITH DIABETIC NEPHROPATHY (H): ICD-10-CM

## 2024-11-13 DIAGNOSIS — N52.9 ERECTILE DYSFUNCTION, UNSPECIFIED ERECTILE DYSFUNCTION TYPE: ICD-10-CM

## 2024-11-13 DIAGNOSIS — N18.2 CKD STAGE 2 DUE TO TYPE 1 DIABETES MELLITUS (H): ICD-10-CM

## 2024-11-13 DIAGNOSIS — I10 ESSENTIAL HYPERTENSION, BENIGN: ICD-10-CM

## 2024-11-13 DIAGNOSIS — Z96.41 INSULIN PUMP STATUS: ICD-10-CM

## 2024-11-13 DIAGNOSIS — R80.9 PROTEINURIA, UNSPECIFIED TYPE: ICD-10-CM

## 2024-11-13 DIAGNOSIS — E10.3299 TYPE 1 DIABETES MELLITUS WITH BACKGROUND RETINOPATHY (H): ICD-10-CM

## 2024-11-13 LAB
ALBUMIN MFR UR ELPH: 7.4 MG/DL
ALBUMIN SERPL BCG-MCNC: 4.1 G/DL (ref 3.5–5.2)
ALBUMIN UR-MCNC: NEGATIVE MG/DL
ANION GAP SERPL CALCULATED.3IONS-SCNC: 10 MMOL/L (ref 7–15)
APPEARANCE UR: CLEAR
BILIRUB UR QL STRIP: NEGATIVE
BUN SERPL-MCNC: 17.4 MG/DL (ref 6–20)
CALCIUM SERPL-MCNC: 9.6 MG/DL (ref 8.8–10.4)
CHLORIDE SERPL-SCNC: 103 MMOL/L (ref 98–107)
COLOR UR AUTO: ABNORMAL
CREAT SERPL-MCNC: 1.09 MG/DL (ref 0.67–1.17)
CREAT UR-MCNC: 51.7 MG/DL
CREAT UR-MCNC: 52.7 MG/DL
EGFRCR SERPLBLD CKD-EPI 2021: 81 ML/MIN/1.73M2
ERYTHROCYTE [DISTWIDTH] IN BLOOD BY AUTOMATED COUNT: 13.1 % (ref 10–15)
EST. AVERAGE GLUCOSE BLD GHB EST-MCNC: 143 MG/DL
EST. AVERAGE GLUCOSE BLD GHB EST-MCNC: 143 MG/DL
GLUCOSE SERPL-MCNC: 81 MG/DL (ref 70–99)
GLUCOSE UR STRIP-MCNC: >=1000 MG/DL
HBA1C MFR BLD: 6.6 %
HBA1C MFR BLD: 6.6 %
HCO3 SERPL-SCNC: 26 MMOL/L (ref 22–29)
HCT VFR BLD AUTO: 47 % (ref 40–53)
HGB BLD-MCNC: 16 G/DL (ref 13.3–17.7)
HGB UR QL STRIP: NEGATIVE
KETONES UR STRIP-MCNC: NEGATIVE MG/DL
LEUKOCYTE ESTERASE UR QL STRIP: NEGATIVE
MCH RBC QN AUTO: 29.4 PG (ref 26.5–33)
MCHC RBC AUTO-ENTMCNC: 34 G/DL (ref 31.5–36.5)
MCV RBC AUTO: 86 FL (ref 78–100)
MICROALBUMIN UR-MCNC: <12 MG/L
MICROALBUMIN/CREAT UR: NORMAL MG/G{CREAT}
NITRATE UR QL: NEGATIVE
PH UR STRIP: 6.5 [PH] (ref 5–7)
PHOSPHATE SERPL-MCNC: 3.3 MG/DL (ref 2.5–4.5)
PLATELET # BLD AUTO: 329 10E3/UL (ref 150–450)
POTASSIUM SERPL-SCNC: 4.4 MMOL/L (ref 3.4–5.3)
PROT/CREAT 24H UR: 0.14 MG/MG CR (ref 0–0.2)
RBC # BLD AUTO: 5.44 10E6/UL (ref 4.4–5.9)
RBC URINE: <1 /HPF
SODIUM SERPL-SCNC: 139 MMOL/L (ref 135–145)
SP GR UR STRIP: 1.01 (ref 1–1.03)
TRANSITIONAL EPI: <1 /HPF
UROBILINOGEN UR STRIP-MCNC: NORMAL MG/DL
WBC # BLD AUTO: 6.8 10E3/UL (ref 4–11)
WBC URINE: <1 /HPF

## 2024-11-13 PROCEDURE — 84156 ASSAY OF PROTEIN URINE: CPT | Performed by: PATHOLOGY

## 2024-11-13 PROCEDURE — 82043 UR ALBUMIN QUANTITATIVE: CPT

## 2024-11-13 PROCEDURE — 80069 RENAL FUNCTION PANEL: CPT | Performed by: PATHOLOGY

## 2024-11-13 PROCEDURE — 99000 SPECIMEN HANDLING OFFICE-LAB: CPT | Performed by: PATHOLOGY

## 2024-11-13 PROCEDURE — 83036 HEMOGLOBIN GLYCOSYLATED A1C: CPT | Performed by: INTERNAL MEDICINE

## 2024-11-13 PROCEDURE — 36415 COLL VENOUS BLD VENIPUNCTURE: CPT | Performed by: PATHOLOGY

## 2024-11-13 PROCEDURE — 81001 URINALYSIS AUTO W/SCOPE: CPT | Performed by: PATHOLOGY

## 2024-11-13 PROCEDURE — 85027 COMPLETE CBC AUTOMATED: CPT | Performed by: PATHOLOGY

## 2024-11-13 PROCEDURE — 83036 HEMOGLOBIN GLYCOSYLATED A1C: CPT

## 2024-11-13 PROCEDURE — 99213 OFFICE O/P EST LOW 20 MIN: CPT

## 2024-11-13 RX ORDER — LISINOPRIL 20 MG/1
20 TABLET ORAL 2 TIMES DAILY
Qty: 180 TABLET | Refills: 3 | Status: SHIPPED | OUTPATIENT
Start: 2024-11-13

## 2024-11-13 RX ORDER — SILDENAFIL 100 MG/1
100 TABLET, FILM COATED ORAL DAILY PRN
Qty: 30 TABLET | Refills: 3 | Status: SHIPPED | OUTPATIENT
Start: 2024-11-13

## 2024-11-13 RX ORDER — AMLODIPINE BESYLATE 10 MG/1
10 TABLET ORAL DAILY
Qty: 90 TABLET | Refills: 3 | Status: SHIPPED | OUTPATIENT
Start: 2024-11-13

## 2024-11-13 RX ORDER — DAPAGLIFLOZIN 5 MG/1
5 TABLET, FILM COATED ORAL DAILY
Qty: 90 TABLET | Refills: 3 | Status: SHIPPED | OUTPATIENT
Start: 2024-11-13

## 2024-11-13 ASSESSMENT — PAIN SCALES - GENERAL: PAINLEVEL_OUTOF10: NO PAIN (0)

## 2024-11-13 NOTE — NURSING NOTE
"Chief Complaint   Patient presents with    RECHECK     6 month follow up.     Vitals:    11/13/24 0935 11/13/24 0938 11/13/24 0940 11/13/24 0941   BP: 127/83 118/73 119/78 121/78   BP Location: Left arm Right arm Right arm    Patient Position: Sitting Sitting Sitting    Cuff Size: Adult Regular Adult Large Adult Regular    Pulse: 63      Temp: 97.9  F (36.6  C)      TempSrc: Oral      SpO2: 96%      Weight: 103.7 kg (228 lb 9.6 oz)      Height: 1.803 m (5' 11\")          BP Readings from Last 3 Encounters:   11/13/24 121/78   10/31/24 127/75   05/13/24 (!) 145/75       /78   Pulse 63   Temp 97.9  F (36.6  C) (Oral)   Ht 1.803 m (5' 11\")   Wt 103.7 kg (228 lb 9.6 oz)   SpO2 96%   BMI 31.88 kg/m       Misa Mccarty    "

## 2024-11-13 NOTE — LETTER
11/13/2024       RE: Aldo Sharpe  [address] [facility] [address]     Dear Colleague,    Thank you for referring your patient, Aldo Sharpe, to the [facility] [place] at [facility]. Please see a copy of my visit note below.    Nephrology Clinic Visit 11/13/24    Assessment and Plan:    1. CKD2 w/minimal albuminuria - Stable. Creat 1.0 eGFR 81 ml/mn   - UACR undetectable today from 1792 mg/gCr ( 5/21)   - Etiology of his CKD is DM/HTN?. Denies h/o retinopathy. Did have proteinuria    - Baseline creat 0.9-1.1 through 2008   - Previous w/u included Kappa/Lambda normal ratio, SPEP/ immunofixation/Hep/HIV all neg   - On ACE   - On SGLT2   - Blood pressures well controlled   - DM well controlled with A1c 6.6% ( 11/24)    2. Volume status/blood pressure - No edema/dyspnea. Clinic b/ps 1218-127/73-83/.   Was started on dapagliflozin 8/21 for edema with improvement. Weight 103.7 kg, from 102.8 kg, 99.5 kg, 101.6 kg. Was 107.1 kg in 8/21 when Dapagliflozin was started.   Current weight is not fluid but body weight. He is very physically active   - Criteria for using SGLT2 inhibitor therapy in T1DM include: BMI > 27, stable optimized insulin therapy, GFR > 60.    - Current b/p regimen:       Amlodipine 10 mg every day      Lisinopril 20 mg bid    3. DM1 - On Insulin pump/CGM- well controlled with A1c of 6.6% ( 11/24). Random BS 81. No retinopathy   - No longer on Metformin given insulin pump that controls his Elvira Phenomenon.     - Dapagliflozin as above   - Per his Endo who patient last saw on 10/31/24 who has continued current regimen    4. Electrolytes - No acute concerns. K 4.4, Na 139    5. Acid base - No acute concerns. Bicarb 26    6. Disposition - RTC 5/14/25 for follow up with labs prior    Assessment and plan was discussed with patient and he voiced his understanding and agreement.    Reason for Visit:  CKD2 follow up    HPI:    Dell is a 55 yo male with CKD2, DM1, HTN, HLD, present today for routine CKD follow up.   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     Personal Hx:   , employed as a , NS, ETOH none  Loves pickLayerVaultball    Allergies:  No Known Allergies    Medications:  Current Outpatient Medications   Medication Sig Dispense Refill     amLODIPine (NORVASC) 10 MG tablet Take 1 tablet (10 mg) by mouth daily. 90 tablet 3     aspirin 81 MG tablet Take 1 tablet (81 mg) by mouth daily 100 tablet 3     blood glucose (NO BRAND SPECIFIED) test strip Use to test blood sugar 8 times daily or as directed. 800 strip 1     blood glucose monitoring (ONETOUCH VERIO) meter device kit Use to test blood sugar 8 times daily or as directed. 1 kit 0     CONTOUR NEXT TEST test strip USE TO TEST BLOOD SUGAR 8  TIMES DAILY OR AS DIRECTED 750 strip 3     dapagliflozin (FARXIGA) 5 MG TABS tablet Take 1 tablet (5 mg) by mouth daily. 90 tablet 3     insulin glargine (BASAGLAR KWIKPEN) 100 UNIT/ML pen Inject 32 units subcutaneous every 24 hours in the event of insulin pump failure. 15 mL 1     lisinopril (ZESTRIL) 20 MG tablet Take 1 tablet (20 mg) by mouth 2 times daily. 180 tablet 3     NOVOLOG VIAL 100 UNIT/ML soln INJECT UP  UNITS     DAILY VIA INSULIN PUMP 160 mL 1     omega 3 1000 MG CAPS Take 2 capsules by mouth daily       sildenafil (VIAGRA) 100 MG tablet Take 1 tablet (100 mg) by mouth daily as needed (erectile dysfunction). 30 tablet 3      simvastatin (ZOCOR) 40 MG tablet TAKE 1 TABLET BY MOUTH EVERYDAY AT BEDTIME 90 tablet 3     glucagon (GLUCAGON EMERGENCY) 1 MG kit Inject 1 mg into the muscle once for 1 dose 1 each 0     No current facility-administered medications for this visit.      Vitals:  Weight: 103.7 kg  B/p 118-127/73-83  HR 63    Exam:  GEN: Pleasant male in NAD  CARDIAC: RRR  LUNGS: CTA  ABDOMEN: Non distended  EXT: No edema  NEURO: A/O  PSYCH: Bright, articulate, calm    LABS:   CMP  Recent Labs   Lab Test 11/13/24  0942 05/13/24  1121 04/11/24  1106 08/14/23  1126 08/13/21  0900 05/28/21  0818 08/26/20  0854 06/20/19  0925 07/10/18  0859    139 140 139   < > 141 138 141 142   POTASSIUM 4.4 4.5 4.7 4.5   < > 4.3 4.1 4.5 3.9   CHLORIDE 103 103 104 102   < > 108 106 107 107   CO2 26 26 26 25   < > 33* 27 25 28   ANIONGAP 10 10 10 12   < > <1* 5 9 7   GLC 81 102* 100* 108*   < > 118* 122* 134* 97   BUN 17.4 18.7 19.2 19.0   < > 17 19 17 16   CR 1.09 1.16 1.15 1.01   < > 1.00 0.94 0.87 0.98   GFRESTIMATED 81 75 76 89   < > 87 >90 >90 82   GFRESTBLACK  --   --   --   --   --  >90 >90 >90 >90   MAGGIE 9.6 9.6 9.8 9.5   < > 8.9 9.3 8.6 8.9    < > = values in this interval not displayed.     Recent Labs   Lab Test 04/11/24  1106 02/22/23  0919 12/28/21  0931 08/26/20  0854 06/20/19  0925   BILITOTAL 0.5  --  0.5 0.3 0.5   ALKPHOS 65  --  69 62 62   ALT 27 21 39 34 27   AST 27  --  18 14 15     CBC  Recent Labs   Lab Test 11/13/24  0956 05/13/24  1121 04/11/24  1106 01/21/22  0828   HGB 16.0 16.4 15.7 15.4   WBC 6.8 7.7 7.3 9.2   RBC 5.44 5.58 5.43 5.35   HCT 47.0 47.7 47.0 47.2   MCV 86 86 87 88   MCH 29.4 29.4 28.9 28.8   MCHC 34.0 34.4 33.4 32.6   RDW 13.1 12.9 13.0 13.1    353 358 358     URINE STUDIES  Recent Labs   Lab Test 11/13/24  0917 05/13/24  1140 01/21/22  0834 10/08/21  0824   COLOR Straw Light Yellow Straw Straw   APPEARANCE Clear Clear Clear Clear   URINEGLC >=1000* >=1000* >499* >499*   URINEBILI Negative Negative  Negative Negative   URINEKETONE Negative Negative Negative Negative   SG 1.014 1.023 1.016 1.018   UBLD Negative Negative Small* Negative   URINEPH 6.5 5.5 5.0 6.0   PROTEIN Negative Negative 30* 30*   NITRITE Negative Negative Negative Negative   LEUKEST Negative Negative Negative Negative   RBCU <1 <1 <1 <1   WBCU <1 <1 <1 <1     Recent Labs   Lab Test 08/05/22  0902 01/21/22  0834 10/08/21  0824 08/13/21  0906   UTPG 1.45* 0.41* 0.96* 2.07*     PTH  Recent Labs   Lab Test 08/13/21  0900   PTHI 33     IRON STUDIES  No lab results found.    Cierra Whitley NP        Again, thank you for allowing me to participate in the care of your patient.      Sincerely,    Cierra Whitley NP

## 2024-11-14 NOTE — PROGRESS NOTES
Nephrology Clinic Visit 11/13/24    Assessment and Plan:    1. CKD2 w/minimal albuminuria - Stable. Creat 1.0 eGFR 81 ml/mn   - UACR undetectable today from 1792 mg/gCr ( 5/21)   - Etiology of his CKD is DM/HTN?. Denies h/o retinopathy. Did have proteinuria    - Baseline creat 0.9-1.1 through 2008   - Previous w/u included Kappa/Lambda normal ratio, SPEP/ immunofixation/Hep/HIV all neg   - On ACE   - On SGLT2   - Blood pressures well controlled   - DM well controlled with A1c 6.6% ( 11/24)    2. Volume status/blood pressure - No edema/dyspnea. Clinic b/ps 1218-127/73-83/.   Was started on dapagliflozin 8/21 for edema with improvement. Weight 103.7 kg, from 102.8 kg, 99.5 kg, 101.6 kg. Was 107.1 kg in 8/21 when Dapagliflozin was started.   Current weight is not fluid but body weight. He is very physically active   - Criteria for using SGLT2 inhibitor therapy in T1DM include: BMI > 27, stable optimized insulin therapy, GFR > 60.    - Current b/p regimen:       Amlodipine 10 mg every day      Lisinopril 20 mg bid    3. DM1 - On Insulin pump/CGM- well controlled with A1c of 6.6% ( 11/24). Random BS 81. No retinopathy   - No longer on Metformin given insulin pump that controls his Elvira Phenomenon.     - Dapagliflozin as above   - Per his Endo who patient last saw on 10/31/24 who has continued current regimen    4. Electrolytes - No acute concerns. K 4.4, Na 139    5. Acid base - No acute concerns. Bicarb 26    6. Disposition - RTC 5/14/25 for follow up with labs prior    Assessment and plan was discussed with patient and he voiced his understanding and agreement.    Reason for Visit:  CKD2 follow up    HPI:  Mr Sharpe is a 55 yo male with CKD2, DM1, HTN, HLD, present today for routine CKD follow up.   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     Personal Hx:   , employed as a , NS, ETOH none  Loves pickleball    Allergies:  No Known Allergies    Medications:  Current Outpatient Medications   Medication Sig Dispense Refill    amLODIPine (NORVASC) 10 MG tablet Take 1 tablet (10 mg) by mouth daily. 90 tablet 3    aspirin 81 MG tablet Take 1 tablet (81 mg) by mouth daily 100 tablet 3    blood glucose (NO BRAND SPECIFIED) test strip Use to test blood sugar 8 times daily or as directed. 800 strip 1    blood glucose monitoring (ONETOUCH VERIO) meter device kit Use to test blood sugar 8 times daily or as directed. 1 kit 0    CONTOUR NEXT TEST test strip USE TO TEST BLOOD SUGAR 8  TIMES DAILY OR AS DIRECTED 750 strip 3    dapagliflozin (FARXIGA) 5 MG TABS tablet Take 1 tablet (5 mg) by mouth daily. 90 tablet 3    insulin glargine (BASAGLAR KWIKPEN) 100 UNIT/ML pen Inject 32 units subcutaneous every 24 hours in the event of insulin pump failure. 15 mL 1    lisinopril (ZESTRIL) 20 MG tablet Take 1 tablet (20 mg) by mouth 2 times daily. 180 tablet 3    NOVOLOG VIAL 100 UNIT/ML soln INJECT UP  UNITS     DAILY VIA INSULIN PUMP 160 mL 1    omega 3 1000 MG CAPS Take 2 capsules by mouth daily      sildenafil (VIAGRA) 100 MG tablet Take 1 tablet (100 mg) by mouth daily as needed (erectile dysfunction). 30 tablet 3    simvastatin (ZOCOR) 40 MG tablet TAKE 1 TABLET BY MOUTH EVERYDAY AT BEDTIME 90 tablet 3    glucagon (GLUCAGON EMERGENCY) 1 MG kit Inject 1 mg into the muscle once for 1 dose 1 each 0     No current facility-administered medications for this visit.      Vitals:  Weight: 103.7 kg  B/p 118-127/73-83  HR 63    Exam:  GEN:  Pleasant male in NAD  CARDIAC: RRR  LUNGS: CTA  ABDOMEN: Non distended  EXT: No edema  NEURO: A/O  PSYCH: Bright, articulate, calm    LABS:   CMP  Recent Labs   Lab Test 11/13/24  0942 05/13/24  1121 04/11/24  1106 08/14/23  1126 08/13/21  0900 05/28/21  0818 08/26/20  0854 06/20/19  0925 07/10/18  0859    139 140 139   < > 141 138 141 142   POTASSIUM 4.4 4.5 4.7 4.5   < > 4.3 4.1 4.5 3.9   CHLORIDE 103 103 104 102   < > 108 106 107 107   CO2 26 26 26 25   < > 33* 27 25 28   ANIONGAP 10 10 10 12   < > <1* 5 9 7   GLC 81 102* 100* 108*   < > 118* 122* 134* 97   BUN 17.4 18.7 19.2 19.0   < > 17 19 17 16   CR 1.09 1.16 1.15 1.01   < > 1.00 0.94 0.87 0.98   GFRESTIMATED 81 75 76 89   < > 87 >90 >90 82   GFRESTBLACK  --   --   --   --   --  >90 >90 >90 >90   MAGGIE 9.6 9.6 9.8 9.5   < > 8.9 9.3 8.6 8.9    < > = values in this interval not displayed.     Recent Labs   Lab Test 04/11/24  1106 02/22/23  0919 12/28/21  0931 08/26/20  0854 06/20/19  0925   BILITOTAL 0.5  --  0.5 0.3 0.5   ALKPHOS 65  --  69 62 62   ALT 27 21 39 34 27   AST 27  --  18 14 15     CBC  Recent Labs   Lab Test 11/13/24  0956 05/13/24  1121 04/11/24  1106 01/21/22  0828   HGB 16.0 16.4 15.7 15.4   WBC 6.8 7.7 7.3 9.2   RBC 5.44 5.58 5.43 5.35   HCT 47.0 47.7 47.0 47.2   MCV 86 86 87 88   MCH 29.4 29.4 28.9 28.8   MCHC 34.0 34.4 33.4 32.6   RDW 13.1 12.9 13.0 13.1    353 358 358     URINE STUDIES  Recent Labs   Lab Test 11/13/24  0917 05/13/24  1140 01/21/22  0834 10/08/21  0824   COLOR Straw Light Yellow Straw Straw   APPEARANCE Clear Clear Clear Clear   URINEGLC >=1000* >=1000* >499* >499*   URINEBILI Negative Negative Negative Negative   URINEKETONE Negative Negative Negative Negative   SG 1.014 1.023 1.016 1.018   UBLD Negative Negative Small* Negative   URINEPH 6.5 5.5 5.0 6.0   PROTEIN Negative Negative 30* 30*   NITRITE Negative Negative Negative Negative   LEUKEST Negative Negative Negative Negative   RBCU <1 <1 <1 <1   WBCU <1 <1  <1 <1     Recent Labs   Lab Test 08/05/22  0902 01/21/22  0834 10/08/21  0824 08/13/21  0906   UTPG 1.45* 0.41* 0.96* 2.07*     PTH  Recent Labs   Lab Test 08/13/21  0900   PTHI 33     IRON STUDIES  No lab results found.    Cierra Whitley, NP

## 2024-11-17 DIAGNOSIS — E10.22 TYPE 1 DIABETES MELLITUS WITH STAGE 2 CHRONIC KIDNEY DISEASE (H): ICD-10-CM

## 2024-11-17 DIAGNOSIS — N18.2 TYPE 1 DIABETES MELLITUS WITH STAGE 2 CHRONIC KIDNEY DISEASE (H): ICD-10-CM

## 2024-11-18 RX ORDER — BLOOD-GLUCOSE METER
EACH MISCELLANEOUS
Qty: 1 KIT | Refills: 0 | Status: SHIPPED | OUTPATIENT
Start: 2024-11-18

## 2024-11-18 NOTE — TELEPHONE ENCOUNTER
Prescription approved per [facility] [place].  Cinthia Greenfield RN  [facility]

## 2024-12-06 DIAGNOSIS — E10.9 TYPE 1 DIABETES MELLITUS WITHOUT COMPLICATION (H): ICD-10-CM

## 2024-12-09 RX ORDER — INSULIN ASPART 100 [IU]/ML
INJECTION, SOLUTION INTRAVENOUS; SUBCUTANEOUS
Qty: 150 ML | Refills: 1 | Status: SHIPPED | OUTPATIENT
Start: 2024-12-09

## 2024-12-09 NOTE — TELEPHONE ENCOUNTER
Last Written Prescription Date:  2/21/24  Last Fill Quantity: 160,  # refills: 1   Last office visit: 10/31/2024 ; last virtual visit: 3/28/2023 with prescribing provider:  Dr. Olivares   Future Office Visit:  3/6/25    Requested Prescriptions   Pending Prescriptions Disp Refills    NOVOLOG VIAL 100 UNIT/ML soln [Pharmacy Med Name: NOVOLOG VIA 100U/ML] 150 mL 1     Sig: INJECT UP  UNITS     DAILY VIA INSULIN PUMP       Insulin Protocol Failed - 12/9/2024  8:32 AM        Failed - Chart review required     Review Chart.    Do not approve if insulin is used in a pump.  Instead, direct refill request to the patient's endocrinologist.  If the patient doesn't have an endocrinologist, then send the refill to the patient's PCP for review            Passed - HgbA1C in past 3 or 6 months     If HgbA1C is 8 or greater, it needs to be on file within the past 3 months.  If less than 8, must be on file within the past 6 months.     Recent Labs   Lab Test 11/13/24  1024   A1C 6.6*             Passed - Medication is active on med list        Passed - Recent (6 mo) or future (90 days) visit within the authorizing provider's specialty     The patient must have completed an in-person or virtual visit within the past 6 months or has a future visit scheduled within the next 90 days with the authorizing provider s specialty.  Urgent care and e-visits do not quality as an office visit for this protocol.          Passed - Medication indicated for associated diagnosis     Medication is associated with one or more of the following diagnoses:   - Type 1 diabetes mellitus  - Type 2 diabetes mellitus  - Diabetic nephropathy; Prophylaxis  - Neuropathy due to diabetes mellitus; Prophylaxis  - Retinopathy due to diabetes mellitus; Prophylaxis  - Diabetes mellitus associated with cystic fibrosis  - Disorder of cardiovascular system; Prophylaxis - Type 1 diabetes mellitus   - Disorder of cardiovascular system; Prophylaxis - Type 2 diabetes  mellitus            Passed - Patient is 18 years of age or older           Refills sent  Pina Sky RN

## 2025-03-06 ENCOUNTER — OFFICE VISIT (OUTPATIENT)
Dept: ENDOCRINOLOGY | Facility: CLINIC | Age: 55
End: 2025-03-06
Payer: COMMERCIAL

## 2025-03-06 VITALS
HEART RATE: 71 BPM | DIASTOLIC BLOOD PRESSURE: 77 MMHG | SYSTOLIC BLOOD PRESSURE: 148 MMHG | BODY MASS INDEX: 32.08 KG/M2 | WEIGHT: 230 LBS

## 2025-03-06 DIAGNOSIS — E10.3299 TYPE 1 DIABETES MELLITUS WITH BACKGROUND RETINOPATHY (H): ICD-10-CM

## 2025-03-06 DIAGNOSIS — Z96.41 INSULIN PUMP STATUS: ICD-10-CM

## 2025-03-06 DIAGNOSIS — E10.21 TYPE 1 DIABETES MELLITUS WITH DIABETIC NEPHROPATHY (H): Primary | ICD-10-CM

## 2025-03-06 DIAGNOSIS — E06.3 HASHIMOTO'S THYROIDITIS: ICD-10-CM

## 2025-03-06 NOTE — PROGRESS NOTES
Recent issues:  Diabetes follow-up evaluation  Using the Medtronic 780G/G4 for the diabetes management.  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 Diagnosis of diabetes mellitus age 15, living in [facility]  Symptoms of increased thirst, feeling unwell  Medical evaluation and lab testing showed high glucose level  Began treatment with diabetes pills, then transitioned to insulin 1-1.5 yrs later  Had taken Lantus, Humalog insulins  Medical evaluations with Dr. Kaleb Rosenthal/[facility]  Recalls problems with armando phenomenon  Hospitalization for DKA: none  1/2019. Switched to Medtronic insulin pump  Using the Medtronic 670G pump and also metformin, uses Guardian3 sensor regularly  Previous [facility] hgbA1c trends include:   Lab Test 08/26/20  0854 01/07/20  1721 10/09/19  1231 06/20/19  0925 03/20/19  1333   A1C 6.7* 6.6* 6.9* 6.8* 7.0*       5/5/21. Initial diabetes evaluation with me at [place]  Reviewed health history and diabetes management  Initial labs included high UCR, then 24-hr urine showing proteinuria up to 2 g/g Cr    8/2021. Nephrology evaluations with Dr. Bethany Rodrigez/[facility]  Started low sodium diet, also Farxiga 5 mg daily, dose reduction metformin as 500 mg BID  Purchased home BP monitor, also started participation with [employer] study that includes use of Apple Watch  Using the Medtronic 670G pump with Guardian3 sensor  Used Novolog, metformin 500 mg BID, Farxiga 5 mg QHS  Blood glucose (BG) meter:  Contour Next, also the Livongo meter   Has tested up to 6-8x/day  Fall '23. Upgraded to Medtronic 780G pump with Guardian4 CGM   Novolog in pump   Farxiga 5 mg in evening    Recent Medtronic 780G pump settings:      Recent Medtronic 780G Carelink data:            Previous [facility] hgbA1c trends include:  Lab Results   Component Value Date    A1C 6.6 (H) 11/13/2024    A1C 6.6 (H) 11/13/2024    A1C 6.1 (H) 04/11/2024     A1C 6.7 (H) 08/14/2023    A1C 6.7 (H) 02/22/2023        Recent [facility] labs include:  Lab Results   Component Value Date    A1C 6.6 (H) 11/13/2024     11/13/2024    POTASSIUM 4.4 11/13/2024    CHLORIDE 103 11/13/2024    CO2 26 11/13/2024    ANIONGAP 10 11/13/2024    GLC 81 11/13/2024    BUN 17.4 11/13/2024    CR 1.09 11/13/2024    GFRESTIMATED 81 11/13/2024    GFRESTBLACK >90 05/28/2021    MAGGIE 9.6 11/13/2024    CPEPT <0.1 (L) 05/28/2021    CHOL 164 08/14/2023    TRIG 111 08/14/2023    HDL 47 08/14/2023    LDL 95 08/14/2023    NHDL 117 08/14/2023    UCRR 51.7 11/13/2024    UCRR 52.7 11/13/2024    MICROL <12.0 11/13/2024    UMALCR  11/13/2024      Comment:      Unable to calculate, urine albumin and/or urine creatinine is outside detectable limits.  Microalbuminuria is defined as an albumin:creatinine ratio of 17 to 299 for males and 25 to 299 for females. A ratio of albumin:creatinine of 300 or higher is indicative of overt proteinuria.  Due to biologic variability, positive results should be confirmed by a second, first-morning random or 24-hour timed urine specimen. If there is discrepancy, a third specimen is recommended. When 2 out of 3 results are in the microalbuminuria range, this is evidence for incipient nephropathy and warrants increased efforts at glucose control, blood pressure control, and institution of therapy with an angiotensin-converting-enzyme (ACE) inhibitor (if the patient can tolerate it).      TSH 2.53 04/11/2024    T4 1.17 02/22/2023     Lab Results   Component Value Date    TSH 2.53 04/11/2024    T4 1.17 02/22/2023    TPO 53 (H) 10/08/2021     Last eye exam 5/13/24 at [facility], NPDR OU  DM Complications:     Nephropathy:    Proteinuria    Takes lisinopril, amlodipine, Farxiga meds    Sees Cierra Whitley CNP/[facility]phrology   Retinopathy:    BDR in 12/2022        Lives in [place], [place], works as [employer]  ([employer]), enjoys pickleball sport  Sees   Kaleb Rosenthal/[facility] for general medicine evaluations.  Also sees Dr. Bethany Rodrigez/[facility]    PMH/PSH:  Past Medical History:   Diagnosis Date    Diabetic eye exam (H) 03/18/2014    Hyperlipidemia LDL goal <100 10/31/2010    Proteinuria     Right foot pain     Type 1 diabetes mellitus with complications (H)     nephropathy, retinopathy     Past Surgical History:   Procedure Laterality Date    APPENDECTOMY      COLONOSCOPY N/A 6/14/2022    Procedure: COLONOSCOPY, FLEXIBLE, WITH LESION REMOVAL USING SNARE;  Surgeon: Danielito Garza MD;  Location:  GI    ORTHOPEDIC SURGERY         Family Hx:  No family history on file.      Social Hx:  Social History     Socioeconomic History    Marital status:      Spouse name: Not on file    Number of children: Not on file    Years of education: Not on file    Highest education level: Not on file   Occupational History    Occupation: [employer]   Tobacco Use    Smoking status: Never     Passive exposure: Never    Smokeless tobacco: Never   Substance and Sexual Activity    Alcohol use: No     Comment: none    Drug use: No    Sexual activity: Yes     Partners: Female   Other Topics Concern    Parent/sibling w/ CABG, MI or angioplasty before 65F 55M? Not Asked   Social History Narrative    Not on file     Social Drivers of Health     Financial Resource Strain: Low Risk  (4/11/2024)    Financial Resource Strain     Within the past 12 months, have you or your family members you live with been unable to get utilities (heat, electricity) when it was really needed?: No   Food Insecurity: Low Risk  (4/11/2024)    Food Insecurity     Within the past 12 months, did you worry that your food would run out before you got money to buy more?: No     Within the past 12 months, did the food you bought just not last and you didn t have money to get more?: No   Transportation Needs: Low Risk  (4/11/2024)    Transportation Needs     Within the past 12 months,  has lack of transportation kept you from medical appointments, getting your medicines, non-medical meetings or appointments, work, or from getting things that you need?: No   Physical Activity: Unknown (4/11/2024)    Exercise Vital Sign     Days of Exercise per Week: 6 days     Minutes of Exercise per Session: Not on file   Stress: No Stress Concern Present (4/11/2024)    [place] [employer] - Occupational Stress Questionnaire     Feeling of Stress : Not at all   Social Connections: Unknown (4/11/2024)    Social Connection and Isolation Panel [NHANES]     Frequency of Communication with Friends and Family: Not on file     Frequency of Social Gatherings with Friends and Family: More than three times a week     Attends [employer] Services: Not on file     Active Member of Clubs or Organizations: Not on file     Attends Club or Organization Meetings: Not on file     Marital Status: Not on file   Interpersonal Safety: Not on file   Housing Stability: Low Risk  (4/11/2024)    Housing Stability     Do you have housing? : Yes     Are you worried about losing your housing?: No          MEDICATIONS:  has a current medication list which includes the following prescription(s): amlodipine, aspirin, dapagliflozin, novolog vial, omega 3, sildenafil, simvastatin, blood glucose, onetouch verio flex system, contour next test, glucagon emergency, insulin glargine, and lisinopril.    ROS:     ROS: 10 point ROS neg other than the symptoms noted above in the HPI.    GENERAL: some fatigue, mild wt gain; denies fevers, chills, night sweats.   HEENT: no dysphagia, odonophagia, diplopia, neck pain  THYROID:  no apparent hyper or hypothyroid symptoms  CV: no chest pain, pressure, palpitations  LUNGS: no SOB, MCMANUS, cough, wheezing   ABDOMEN: some bloating; no diarrhea, constipation, abdominal pain  EXTREMITIES: no rashes, ulcers, edema  NEUROLOGY: no headaches, denies changes in vision, tingling, extremitiy numbness   MSK:   previous right ankle/foot pains; denies muscle weakness  SKIN: no rashes or lesions  : some decreased erection function, uses sildenafil  PSYCH:  stable mood, no significant anxiety or depression  ENDOCRINE: no heat or cold intolerance      Physical Exam   VS: BP (!) 148/77   Pulse 71   Wt 104.3 kg (230 lb)   BMI 32.08 kg/m    GENERAL: AXOX3, NAD, well dressed, answering questions appropriately, appears stated age.  ENT: no nose swelling or nasal discharge, mouth redness or gum changes.  EYES: eyes grossly normal to inspection, conjunctivae and sclerae normal, no exophthalmos or proptosis  THYROID:  no apparent nodules or goiter  LUNGS: no audible wheeze, cough or visible cyanosis, or increased work of breathing  ABDOMEN: abdomen mildly obese size  EXTREMITIES: normal appearance of feet, pulses R/L DP 4/4, no skin lesions or edema noted  NEUROLOGY: CN grossly intact, no tremors  MSK: grossly intact  SKIN:  no apparent skin lesions, rash, or edema with visualized skin appearance  PSYCH: mentation appears normal, affect normal/bright, judgement and insight intact,   normal speech and appearance well groomed    LABS:    All pertinent notes, labs, and images personally reviewed by me.     A/P:  Encounter Diagnoses   Name Primary?    Type 1 diabetes mellitus with diabetic nephropathy (H) Yes    Type 1 diabetes mellitus with background retinopathy (H)     Insulin pump status     Hashimoto's thyroiditis        Comments:  Reviewed health history and diabetes issues.  Recent CGM glucose trends good overall, infrequent significant post supper hyperglycemia   Reviewed and interpreted tests that I previously ordered.   Ordered appropriate tests for the endocrinology disease management.    Management options discussed and implemented after shared medical decision making with the patient.  T1DM problem is chronic-stable    Plan:  Reviewed the overall T1DM management and insulin pump use.  Discussed optimal BG testing to  assess glucose trends.  We reviewed insulin pump settings, basal rate and bolus dosing  Use of automated pump bolus dosing for meal/snack carb & correction dosing  Reviewed the recent Medtronic Carelink pump report and Guardian4 CGM glucose trend data, in detail    Recommend:  Continue the current Medtronic 780G pump and Guradian4 CGM, diabetes management  No pump setting changes at this time    Reviewed Guardian4 sensor use, benefits of pump auto mode  Use the pump Temp target setting for aerobic exercise (PB)  Monitor for symptom changes  Keep focus on diet, exercise, and weight loss management  Reviewed option using Temp Target setting, having glucose tablets available with aerobic exercise  Consider follow-up appointment with [facility] Diab Educator   Review pump and also diet plan, weight loss   Would not use GLP1RA medications with his history of T1DM  Continue the BP and lipid medication plan  Need close monitoring of kidney function, eGFR, BP  Need repeat fasting lab testing soon   Discussed nearby [facility] or [facility] lab   Lab order placed  Arrange annual dilated eye exam, fasting lipid panel testing.    Keep follow-up nephrology appointments to assist with kidney function, antihypertensive management.  Addressed patient's questions today.    The longitudinal plan of care for the endocrine problem(s) were addressed during this visit.  Due to added complexity of care,   we will continue to support the patient and the subsequent management of this condition with ongoing continuity of care.    There are no Patient Instructions on file for this visit.    Future labs ordered today:   Orders Placed This Encounter   Procedures    GLUCOSE MONITOR, 72 HOUR, PHYS INTERP    Hemoglobin A1c    Basic metabolic panel    TSH with free T4 reflex     Radiology/Consults ordered today: None    Total time spent on day of encounter:  32 min    Follow-up:  6/2025 VVAm,    9/2025 Return    NIRALI Olivares MD,  MS  Endocrinology  [facility]    CC: KEELEY Rosenthal

## 2025-03-10 DIAGNOSIS — E10.22 TYPE 1 DIABETES MELLITUS WITH STAGE 2 CHRONIC KIDNEY DISEASE (H): ICD-10-CM

## 2025-03-10 DIAGNOSIS — N18.2 TYPE 1 DIABETES MELLITUS WITH STAGE 2 CHRONIC KIDNEY DISEASE (H): ICD-10-CM

## 2025-03-13 RX ORDER — BLOOD-GLUCOSE METER
1 EACH MISCELLANEOUS
Qty: 1 KIT | Refills: 0 | Status: SHIPPED | OUTPATIENT
Start: 2025-03-13

## 2025-03-16 ENCOUNTER — MYC REFILL (OUTPATIENT)
Dept: NEPHROLOGY | Facility: CLINIC | Age: 55
End: 2025-03-16
Payer: COMMERCIAL

## 2025-03-16 ENCOUNTER — MYC REFILL (OUTPATIENT)
Dept: INTERNAL MEDICINE | Facility: CLINIC | Age: 55
End: 2025-03-16
Payer: COMMERCIAL

## 2025-03-16 DIAGNOSIS — E10.9 TYPE 1 DIABETES MELLITUS WITHOUT COMPLICATION (H): ICD-10-CM

## 2025-03-16 DIAGNOSIS — R80.9 PROTEINURIA, UNSPECIFIED TYPE: ICD-10-CM

## 2025-03-16 DIAGNOSIS — I10 ESSENTIAL HYPERTENSION, BENIGN: ICD-10-CM

## 2025-03-16 DIAGNOSIS — Z96.41 INSULIN PUMP STATUS: ICD-10-CM

## 2025-03-19 ENCOUNTER — TELEPHONE (OUTPATIENT)
Dept: NEPHROLOGY | Facility: CLINIC | Age: 55
End: 2025-03-19

## 2025-03-19 DIAGNOSIS — Z96.41 INSULIN PUMP STATUS: ICD-10-CM

## 2025-03-19 RX ORDER — DAPAGLIFLOZIN 5 MG/1
5 TABLET, FILM COATED ORAL DAILY
Qty: 90 TABLET | Refills: 3 | Status: SHIPPED | OUTPATIENT
Start: 2025-03-19

## 2025-03-19 RX ORDER — LISINOPRIL 20 MG/1
20 TABLET ORAL 2 TIMES DAILY
Qty: 180 TABLET | Refills: 3 | Status: SHIPPED | OUTPATIENT
Start: 2025-03-19

## 2025-03-19 NOTE — TELEPHONE ENCOUNTER
Retail Pharmacy Prior Authorization Team   Phone: [phone]    PRIOR AUTHORIZATION DENIED    Medication: FARXIGA 5 MG PO TABS  Insurance Company: [employer] - Phone [phone] Fax [phone]  Denial Date: 3/19/2025  Denial Reason(s): MUST TRY/FAIL JARDIANCE      Appeal Information: IF THE PROVIDER WOULD LIKE TO APPEAL THIS DECISION PLEASE PROVIDE THE PA TEAM WITH A LETTER OF MEDICAL NECESSITY      Patient Notified: NO

## 2025-03-27 ENCOUNTER — LAB (OUTPATIENT)
Dept: LAB | Facility: CLINIC | Age: 55
End: 2025-03-27
Payer: COMMERCIAL

## 2025-03-27 DIAGNOSIS — Z96.41 INSULIN PUMP STATUS: ICD-10-CM

## 2025-03-27 DIAGNOSIS — E10.3299 TYPE 1 DIABETES MELLITUS WITH BACKGROUND RETINOPATHY (H): ICD-10-CM

## 2025-03-27 DIAGNOSIS — E10.21 TYPE 1 DIABETES MELLITUS WITH DIABETIC NEPHROPATHY (H): ICD-10-CM

## 2025-03-27 DIAGNOSIS — E06.3 HASHIMOTO'S THYROIDITIS: ICD-10-CM

## 2025-03-27 LAB
ANION GAP SERPL CALCULATED.3IONS-SCNC: 11 MMOL/L (ref 7–15)
BUN SERPL-MCNC: 20.4 MG/DL (ref 6–20)
CALCIUM SERPL-MCNC: 9.5 MG/DL (ref 8.8–10.4)
CHLORIDE SERPL-SCNC: 105 MMOL/L (ref 98–107)
CREAT SERPL-MCNC: 1.09 MG/DL (ref 0.67–1.17)
EGFRCR SERPLBLD CKD-EPI 2021: 81 ML/MIN/1.73M2
EST. AVERAGE GLUCOSE BLD GHB EST-MCNC: 134 MG/DL
GLUCOSE SERPL-MCNC: 87 MG/DL (ref 70–99)
HBA1C MFR BLD: 6.3 % (ref 0–5.6)
HCO3 SERPL-SCNC: 24 MMOL/L (ref 22–29)
POTASSIUM SERPL-SCNC: 4.4 MMOL/L (ref 3.4–5.3)
SODIUM SERPL-SCNC: 140 MMOL/L (ref 135–145)
TSH SERPL DL<=0.005 MIU/L-ACNC: 3.22 UIU/ML (ref 0.3–4.2)

## 2025-05-05 ENCOUNTER — MYC MEDICAL ADVICE (OUTPATIENT)
Dept: INTERNAL MEDICINE | Facility: CLINIC | Age: 55
End: 2025-05-05
Payer: COMMERCIAL

## 2025-05-07 ENCOUNTER — PATIENT OUTREACH (OUTPATIENT)
Dept: NEPHROLOGY | Facility: CLINIC | Age: 55
End: 2025-05-07
Payer: COMMERCIAL

## 2025-05-07 DIAGNOSIS — E10.22 TYPE 1 DIABETES MELLITUS WITH STAGE 2 CHRONIC KIDNEY DISEASE (H): Primary | ICD-10-CM

## 2025-05-07 DIAGNOSIS — N18.2 TYPE 1 DIABETES MELLITUS WITH STAGE 2 CHRONIC KIDNEY DISEASE (H): Primary | ICD-10-CM

## 2025-05-24 ENCOUNTER — HEALTH MAINTENANCE LETTER (OUTPATIENT)
Age: 55
End: 2025-05-24

## 2025-06-29 DIAGNOSIS — E78.5 HYPERLIPIDEMIA LDL GOAL <100: ICD-10-CM

## 2025-06-30 ENCOUNTER — OFFICE VISIT (OUTPATIENT)
Dept: ENDOCRINOLOGY | Facility: CLINIC | Age: 55
End: 2025-06-30
Payer: COMMERCIAL

## 2025-06-30 VITALS
SYSTOLIC BLOOD PRESSURE: 123 MMHG | DIASTOLIC BLOOD PRESSURE: 78 MMHG | HEART RATE: 60 BPM | BODY MASS INDEX: 32.08 KG/M2 | WEIGHT: 230 LBS

## 2025-06-30 DIAGNOSIS — Z96.41 INSULIN PUMP STATUS: ICD-10-CM

## 2025-06-30 DIAGNOSIS — E10.21 TYPE 1 DIABETES MELLITUS WITH DIABETIC NEPHROPATHY (H): Primary | ICD-10-CM

## 2025-06-30 DIAGNOSIS — E10.3299 TYPE 1 DIABETES MELLITUS WITH BACKGROUND RETINOPATHY (H): ICD-10-CM

## 2025-06-30 PROCEDURE — 95251 CONT GLUC MNTR ANALYSIS I&R: CPT | Performed by: INTERNAL MEDICINE

## 2025-06-30 PROCEDURE — 99214 OFFICE O/P EST MOD 30 MIN: CPT | Performed by: INTERNAL MEDICINE

## 2025-06-30 PROCEDURE — G2211 COMPLEX E/M VISIT ADD ON: HCPCS | Performed by: INTERNAL MEDICINE

## 2025-06-30 NOTE — PROGRESS NOTES
Recent issues:  Diabetes follow-up evaluation  Using the Medtronic 780G/G4 for the diabetes management.  Feeling well overall, but mild weight gain trend  Using the extended wear Medtronic infusion sets, lasting 5-6 days  Overdue for followup diabetes and lipid testing        1985. Diagnosis of diabetes mellitus age 15, living in [facility]  Symptoms of increased thirst, feeling unwell  Medical evaluation and lab testing showed high glucose level  Began treatment with diabetes pills, then transitioned to insulin 1-1.5 yrs later  Had taken Lantus, Humalog insulins  Medical evaluations with Dr. Kaleb Rosenthal/[facility]  Recalls problems with armando phenomenon  Hospitalization for DKA: none  1/2019. Switched to Medtronic insulin pump  Using the Medtronic 670G pump and also metformin, uses Guardian3 sensor regularly  Previous  hgbA1c trends include:   Lab Test 08/26/20  0854 01/07/20  1721 10/09/19  1231 06/20/19  0925 03/20/19  1333   A1C 6.7* 6.6* 6.9* 6.8* 7.0*       5/5/21. Initial diabetes evaluation with me at [facility]  Reviewed health history and diabetes management  Initial labs included high UCR, then 24-hr urine showing proteinuria up to 2 g/g Cr    8/2021. Nephrology evaluations with Dr. Bethany Rodrigez/[facility]  Started low sodium diet, also Farxiga 5 mg daily, dose reduction metformin as 500 mg BID  Purchased home BP monitor, also started participation with [employer] study that includes use of Apple Watch  Using the Medtronic 670G pump with Guardian3 sensor  Used Novolog, metformin 500 mg BID, Farxiga 5 mg QHS  Blood glucose (BG) meter:  Contour Next, also the Livongo meter   Has tested up to 6-8x/day  2/2025. Changed from Farxiga to Jardiance  Fall '23. Upgraded to Medtronic 780G pump with Guardian4 CGM   Novolog in pump   Jardiance 10 mg in evening    Current Medtronic 780G pump settings:      Recent Medtronic 780G Carelink data:            Previous [facility] hgbA1c trends include:  Lab Results    Component Value Date    A1C 6.3 (H) 03/27/2025    A1C 6.6 (H) 11/13/2024    A1C 6.6 (H) 11/13/2024    A1C 6.1 (H) 04/11/2024    A1C 6.7 (H) 08/14/2023        Recent [facility] labs include:  Lab Results   Component Value Date    A1C 6.3 (H) 03/27/2025     03/27/2025    POTASSIUM 4.4 03/27/2025    CHLORIDE 105 03/27/2025    CO2 24 03/27/2025    ANIONGAP 11 03/27/2025    GLC 87 03/27/2025    BUN 20.4 (H) 03/27/2025    CR 1.09 03/27/2025    GFRESTIMATED 81 03/27/2025    GFRESTBLACK >90 05/28/2021    MAGGIE 9.5 03/27/2025    CPEPT <0.1 (L) 05/28/2021    CHOL 164 08/14/2023    TRIG 111 08/14/2023    HDL 47 08/14/2023    LDL 95 08/14/2023    NHDL 117 08/14/2023    UCRR 51.7 11/13/2024    UCRR 52.7 11/13/2024    MICROL <12.0 11/13/2024    UMALCR  11/13/2024      Comment:      Unable to calculate, urine albumin and/or urine creatinine is outside detectable limits.  Microalbuminuria is defined as an albumin:creatinine ratio of 17 to 299 for males and 25 to 299 for females. A ratio of albumin:creatinine of 300 or higher is indicative of overt proteinuria.  Due to biologic variability, positive results should be confirmed by a second, first-morning random or 24-hour timed urine specimen. If there is discrepancy, a third specimen is recommended. When 2 out of 3 results are in the microalbuminuria range, this is evidence for incipient nephropathy and warrants increased efforts at glucose control, blood pressure control, and institution of therapy with an angiotensin-converting-enzyme (ACE) inhibitor (if the patient can tolerate it).      TSH 3.22 03/27/2025    T4 1.17 02/22/2023     Lab Results   Component Value Date    TSH 3.22 03/27/2025    T4 1.17 02/22/2023    TPO 53 (H) 10/08/2021     Last eye exam 5/13/24 at [facility], NPDR OU  DM Complications:     Nephropathy:    Proteinuria    Takes lisinopril, amlodipine, Jardiance meds    Sees Cierra Whitley CNP/[facility]   Retinopathy:    BDR in  12/2022        Lives in [place], [place], works as [employer]  ([employer]), enjoys pickleball sport  Sees Dr. Kaleb Rosenthal/[facility] for general medicine evaluations.  Also sees Dr. Bethany Rodrigez/[facility]    PMH/PSH:  Past Medical History:   Diagnosis Date    Diabetic eye exam (H) 03/18/2014    Hyperlipidemia LDL goal <100 10/31/2010    Proteinuria     Right foot pain     Type 1 diabetes mellitus with complications (H)     nephropathy, retinopathy     Past Surgical History:   Procedure Laterality Date    APPENDECTOMY      COLONOSCOPY N/A 6/14/2022    Procedure: COLONOSCOPY, FLEXIBLE, WITH LESION REMOVAL USING SNARE;  Surgeon: Danielito Garza MD;  Location:  GI    ORTHOPEDIC SURGERY         Family Hx:  No family history on file.      Social Hx:  Social History     Socioeconomic History    Marital status:      Spouse name: Not on file    Number of children: Not on file    Years of education: Not on file    Highest education level: Not on file   Occupational History    Occupation:    Tobacco Use    Smoking status: Never     Passive exposure: Never    Smokeless tobacco: Never   Substance and Sexual Activity    Alcohol use: No     Comment: none    Drug use: No    Sexual activity: Yes     Partners: Female   Other Topics Concern    Parent/sibling w/ CABG, MI or angioplasty before 65F 55M? Not Asked   Social History Narrative    Not on file     Social Drivers of Health     Financial Resource Strain: Low Risk  (4/11/2024)    Financial Resource Strain     Within the past 12 months, have you or your family members you live with been unable to get utilities (heat, electricity) when it was really needed?: No   Food Insecurity: Low Risk  (4/11/2024)    Food Insecurity     Within the past 12 months, did you worry that your food would run out before you got money to buy more?: No     Within the past 12 months, did the food you bought just not last and you didn t have money  to get more?: No   Transportation Needs: Low Risk  (4/11/2024)    Transportation Needs     Within the past 12 months, has lack of transportation kept you from medical appointments, getting your medicines, non-medical meetings or appointments, work, or from getting things that you need?: No   Physical Activity: Unknown (4/11/2024)    Exercise Vital Sign     Days of Exercise per Week: 6 days     Minutes of Exercise per Session: Not on file   Stress: No Stress Concern Present (4/11/2024)    [place] [employer] - Occupational Stress Questionnaire     Feeling of Stress : Not at all   Social Connections: Unknown (4/11/2024)    Social Connection and Isolation Panel [NHANES]     Frequency of Communication with Friends and Family: Not on file     Frequency of Social Gatherings with Friends and Family: More than three times a week     Attends Rastafari Services: Not on file     Active Member of Clubs or Organizations: Not on file     Attends Club or Organization Meetings: Not on file     Marital Status: Not on file   Interpersonal Safety: Not on file   Housing Stability: Low Risk  (4/11/2024)    Housing Stability     Do you have housing? : Yes     Are you worried about losing your housing?: No          MEDICATIONS:  has a current medication list which includes the following prescription(s): amlodipine, aspirin, empagliflozin, insulin glargine, lisinopril, novolog vial, omega 3, sildenafil, simvastatin, blood glucose, onetouch verio flex system, contour next test, dapagliflozin, and glucagon emergency.    ROS:     ROS: 10 point ROS neg other than the symptoms noted above in the HPI.    GENERAL: some fatigue, mild wt gain; denies fevers, chills, night sweats.   HEENT: no dysphagia, odonophagia, diplopia, neck pain  THYROID:  no apparent hyper or hypothyroid symptoms  CV: no chest pain, pressure, palpitations  LUNGS: no SOB, MCMANUS, cough, wheezing   ABDOMEN: some bloating; no diarrhea, constipation, abdominal  pain  EXTREMITIES: no rashes, ulcers, edema  NEUROLOGY: no headaches, denies changes in vision, tingling, extremitiy numbness   MSK:  previous right ankle/foot pains; denies muscle weakness  SKIN: no rashes or lesions  : some decreased erection function, uses sildenafil  PSYCH:  stable mood, no significant anxiety or depression  ENDOCRINE: no heat or cold intolerance      Physical Exam   VS: /78   Pulse 60   Wt 104.3 kg (230 lb)   BMI 32.08 kg/m    GENERAL: AXOX3, NAD, well dressed, answering questions appropriately, appears stated age.  ENT: no nose swelling or nasal discharge, mouth redness or gum changes.  EYES: eyes grossly normal to inspection, conjunctivae and sclerae normal, no exophthalmos or proptosis  THYROID:  no apparent nodules or goiter  LUNGS: no audible wheeze, cough or visible cyanosis, or increased work of breathing  ABDOMEN: abdomen mildly obese size  EXTREMITIES: normal appearance of feet, pulses R/L DP 4/4, no skin lesions or edema noted  NEUROLOGY: CN grossly intact, no tremors  MSK: grossly intact  SKIN:  no apparent skin lesions, rash, or edema with visualized skin appearance  PSYCH: mentation appears normal, affect normal/bright, judgement and insight intact,   normal speech and appearance well groomed    LABS:    All pertinent notes, labs, and images personally reviewed by me.     A/P:  Encounter Diagnoses   Name Primary?    Type 1 diabetes mellitus with diabetic nephropathy (H) Yes    Type 1 diabetes mellitus with background retinopathy (H)     Insulin pump status        Comments:  Reviewed health history and diabetes issues.  Recent CGM glucose trends good overall, infrequent significant post supper hyperglycemia   Reviewed and interpreted tests that I previously ordered.   Ordered appropriate tests for the endocrinology disease management.    Management options discussed and implemented after shared medical decision making with the patient.  T1DM problem is  chronic-stable    Plan:  Reviewed the overall T1DM management and insulin pump use.  Discussed optimal BG testing to assess glucose trends.  We reviewed insulin pump settings, basal rate and bolus dosing  Use of automated pump bolus dosing for meal/snack carb & correction dosing  Reviewed the recent Medtronic Carelink pump report and Guardian4 CGM glucose trend data, in detail    Recommend:  Continue the current Medtronic 780G pump and Guradian4 CGM, diabetes management  Pump setting changes:   Bolus ICR:  5p 3.7 to 4.0    Reviewed Guardian4 sensor use, benefits of pump auto mode  Use the pump Temp target setting for aerobic exercise (PB)  Monitor for symptom changes  Keep focus on diet, exercise, and weight loss management  Reviewed option using Temp Target setting, having glucose tablets available with aerobic exercise  Consider follow-up appointment with [facility] Diab Educator  Would not use GLP1RA medications with his history of T1DM  Continue the BP and lipid medication plan  Need close monitoring of kidney function, eGFR, BP  Need repeat fasting lab testing soon   Discussed nearby [facility] or [facility] lab   Lab order placed  Arrange annual dilated eye exam, fasting lipid panel testing.    Keep follow-up nephrology appointments to assist with kidney function, antihypertensive management.  Addressed patient's questions today.    The longitudinal plan of care for the endocrine problem(s) were addressed during this visit.  Due to added complexity of care,   we will continue to support the patient and the subsequent management of this condition with ongoing continuity of care.    There are no Patient Instructions on file for this visit.    Future labs ordered today:   Orders Placed This Encounter   Procedures    GLUCOSE MONITOR, 72 HOUR, PHYS INTERP    Hemoglobin A1c    Basic metabolic panel    Lipid panel reflex to direct LDL Fasting     Radiology/Consults ordered today: None    Total time spent on  day of encounter:  25 min    Follow-up:  9/11/25 at 12p, Return    1/26/26 at 11:30a, Return    NIRALI Olivares MD, MS  Endocrinology  [facility]    CC: KEELEY Rosenthal [place], D

## 2025-06-30 NOTE — TELEPHONE ENCOUNTER
Requested Prescriptions   Pending Prescriptions Disp Refills    simvastatin (ZOCOR) 40 MG tablet [Pharmacy Med Name: SIMVASTATIN 40 MG TABLET] 90 tablet 3     Sig: TAKE 1 TABLET BY MOUTH EVERYDAY AT BEDTIME       Antihyperlipidemic agents Failed - 6/30/2025 10:07 AM        Failed - LDL on file in the past 12 months        Passed - Medication is active on med list and the sig matches. RN to manually verify dose and sig if red X/fail.     If the protocol passes (green check), you do not need to verify med dose and sig.    A prescription matches if they are the same clinical intention.    For Example: once daily and every morning are the same.    The protocol can not identify upper and lower case letters as matching and will fail.     For Example: Take 1 tablet (50 mg) by mouth daily     TAKE 1 TABLET (50 MG) BY MOUTH DAILY    For all fails (red x), verify dose and sig.    If the refill does match what is on file, the RN can still proceed to approve the refill request.       If they do not match, route to the appropriate provider.             Passed - Recent (12 month) or future (90 days) visit with authorizing provider's specialty (provided they have been seen in the past 15 months)     The patient must have completed an in-person or virtual visit within the past 12 months or has a future visit scheduled within the next 90 days with the authorizing provider s specialty.  Urgent care and e-visits do not qualify as an office visit for this protocol.          Passed - Patient is age 18 years or older           Last Written Prescription Date:  7/1/24  Last Fill Quantity: 90 tab,  # refills: 3   Last office visit: 3/6/2025 ; last virtual visit: Visit date not found with prescribing provider:  Dr Olivares   Future Office Visit:  6/30/25

## 2025-07-01 RX ORDER — SIMVASTATIN 40 MG
40 TABLET ORAL AT BEDTIME
Qty: 90 TABLET | Refills: 0 | Status: SHIPPED | OUTPATIENT
Start: 2025-07-01

## 2025-07-28 ENCOUNTER — LAB (OUTPATIENT)
Dept: LAB | Facility: CLINIC | Age: 55
End: 2025-07-28
Payer: COMMERCIAL

## 2025-07-28 DIAGNOSIS — Z96.41 INSULIN PUMP STATUS: ICD-10-CM

## 2025-07-28 DIAGNOSIS — N18.2 CKD STAGE 2 DUE TO TYPE 1 DIABETES MELLITUS (H): ICD-10-CM

## 2025-07-28 DIAGNOSIS — E10.3299 TYPE 1 DIABETES MELLITUS WITH BACKGROUND RETINOPATHY (H): ICD-10-CM

## 2025-07-28 DIAGNOSIS — E10.22 TYPE 1 DIABETES MELLITUS WITH STAGE 2 CHRONIC KIDNEY DISEASE (H): ICD-10-CM

## 2025-07-28 DIAGNOSIS — E10.21 TYPE 1 DIABETES MELLITUS WITH DIABETIC NEPHROPATHY (H): ICD-10-CM

## 2025-07-28 DIAGNOSIS — E10.22 CKD STAGE 2 DUE TO TYPE 1 DIABETES MELLITUS (H): ICD-10-CM

## 2025-07-28 DIAGNOSIS — N18.2 TYPE 1 DIABETES MELLITUS WITH STAGE 2 CHRONIC KIDNEY DISEASE (H): ICD-10-CM

## 2025-07-28 LAB
ALBUMIN SERPL BCG-MCNC: 4.2 G/DL (ref 3.5–5.2)
ANION GAP SERPL CALCULATED.3IONS-SCNC: 9 MMOL/L (ref 7–15)
BUN SERPL-MCNC: 21.8 MG/DL (ref 6–20)
CALCIUM SERPL-MCNC: 9.8 MG/DL (ref 8.8–10.4)
CHLORIDE SERPL-SCNC: 103 MMOL/L (ref 98–107)
CHOLEST SERPL-MCNC: 220 MG/DL
CREAT SERPL-MCNC: 1.16 MG/DL (ref 0.67–1.17)
CREAT UR-MCNC: 115 MG/DL
EGFRCR SERPLBLD CKD-EPI 2021: 75 ML/MIN/1.73M2
EST. AVERAGE GLUCOSE BLD GHB EST-MCNC: 128 MG/DL
FASTING STATUS PATIENT QL REPORTED: YES
GLUCOSE SERPL-MCNC: 106 MG/DL (ref 70–99)
HBA1C MFR BLD: 6.1 % (ref 0–5.6)
HCO3 SERPL-SCNC: 28 MMOL/L (ref 22–29)
HDLC SERPL-MCNC: 45 MG/DL
LDLC SERPL CALC-MCNC: 154 MG/DL
MICROALBUMIN UR-MCNC: 14.3 MG/L
MICROALBUMIN/CREAT UR: 12.43 MG/G CR (ref 0–17)
NONHDLC SERPL-MCNC: 175 MG/DL
PHOSPHATE SERPL-MCNC: 3.6 MG/DL (ref 2.5–4.5)
POTASSIUM SERPL-SCNC: 4.6 MMOL/L (ref 3.4–5.3)
SODIUM SERPL-SCNC: 140 MMOL/L (ref 135–145)
TRIGL SERPL-MCNC: 107 MG/DL

## 2025-07-28 PROCEDURE — 36415 COLL VENOUS BLD VENIPUNCTURE: CPT

## 2025-07-28 PROCEDURE — 82043 UR ALBUMIN QUANTITATIVE: CPT

## 2025-07-28 PROCEDURE — 80069 RENAL FUNCTION PANEL: CPT

## 2025-07-28 PROCEDURE — 80061 LIPID PANEL: CPT

## 2025-07-28 PROCEDURE — 82570 ASSAY OF URINE CREATININE: CPT

## 2025-07-28 PROCEDURE — 83036 HEMOGLOBIN GLYCOSYLATED A1C: CPT

## 2025-08-05 ENCOUNTER — TELEPHONE (OUTPATIENT)
Dept: INTERNAL MEDICINE | Facility: CLINIC | Age: 55
End: 2025-08-05
Payer: COMMERCIAL

## 2025-08-05 DIAGNOSIS — N18.2 TYPE 1 DIABETES MELLITUS WITH STAGE 2 CHRONIC KIDNEY DISEASE (H): ICD-10-CM

## 2025-08-05 DIAGNOSIS — E10.22 TYPE 1 DIABETES MELLITUS WITH STAGE 2 CHRONIC KIDNEY DISEASE (H): ICD-10-CM

## 2025-08-05 DIAGNOSIS — Z96.41 INSULIN PUMP STATUS: ICD-10-CM

## 2025-08-05 RX ORDER — BLOOD-GLUCOSE METER
1 EACH MISCELLANEOUS
Qty: 1 KIT | Refills: 0 | Status: SHIPPED | OUTPATIENT
Start: 2025-08-05

## 2025-08-05 RX ORDER — BLOOD-GLUCOSE METER
1 EACH MISCELLANEOUS
Qty: 1 KIT | Refills: 0 | Status: SHIPPED | OUTPATIENT
Start: 2025-08-05 | End: 2025-08-05

## 2025-08-09 DIAGNOSIS — N52.9 ERECTILE DYSFUNCTION, UNSPECIFIED ERECTILE DYSFUNCTION TYPE: ICD-10-CM

## 2025-08-11 RX ORDER — SILDENAFIL 100 MG/1
100 TABLET, FILM COATED ORAL DAILY
Qty: 30 TABLET | Refills: 0 | Status: SHIPPED | OUTPATIENT
Start: 2025-08-11

## (undated) RX ORDER — FENTANYL CITRATE 50 UG/ML
INJECTION, SOLUTION INTRAMUSCULAR; INTRAVENOUS
Status: DISPENSED
Start: 2022-06-14